# Patient Record
Sex: FEMALE | Race: WHITE | NOT HISPANIC OR LATINO
[De-identification: names, ages, dates, MRNs, and addresses within clinical notes are randomized per-mention and may not be internally consistent; named-entity substitution may affect disease eponyms.]

---

## 2017-05-11 PROBLEM — Z00.00 ENCOUNTER FOR PREVENTIVE HEALTH EXAMINATION: Status: ACTIVE | Noted: 2017-05-11

## 2019-10-28 ENCOUNTER — APPOINTMENT (OUTPATIENT)
Dept: ORTHOPEDIC SURGERY | Facility: CLINIC | Age: 79
End: 2019-10-28
Payer: MEDICARE

## 2019-10-28 VITALS
WEIGHT: 159 LBS | SYSTOLIC BLOOD PRESSURE: 118 MMHG | BODY MASS INDEX: 31.22 KG/M2 | HEIGHT: 60 IN | DIASTOLIC BLOOD PRESSURE: 75 MMHG | HEART RATE: 69 BPM

## 2019-10-28 PROCEDURE — 73564 X-RAY EXAM KNEE 4 OR MORE: CPT | Mod: RT

## 2019-10-28 PROCEDURE — 99204 OFFICE O/P NEW MOD 45 MIN: CPT

## 2020-01-10 ENCOUNTER — APPOINTMENT (OUTPATIENT)
Dept: ORTHOPEDIC SURGERY | Facility: CLINIC | Age: 80
End: 2020-01-10
Payer: MEDICARE

## 2020-01-10 VITALS
SYSTOLIC BLOOD PRESSURE: 115 MMHG | HEART RATE: 74 BPM | BODY MASS INDEX: 31.22 KG/M2 | HEIGHT: 60 IN | DIASTOLIC BLOOD PRESSURE: 74 MMHG | WEIGHT: 159 LBS

## 2020-01-10 PROCEDURE — 73565 X-RAY EXAM OF KNEES: CPT

## 2020-01-10 PROCEDURE — 72170 X-RAY EXAM OF PELVIS: CPT

## 2020-01-10 PROCEDURE — 99214 OFFICE O/P EST MOD 30 MIN: CPT

## 2020-01-14 RX ORDER — CHROMIUM 200 MCG
TABLET ORAL
Refills: 0 | Status: ACTIVE | COMMUNITY

## 2020-01-14 RX ORDER — ASPIRIN 81 MG
81 TABLET, DELAYED RELEASE (ENTERIC COATED) ORAL
Refills: 0 | Status: ACTIVE | COMMUNITY

## 2020-01-14 RX ORDER — OXCARBAZEPINE 300 MG/1
300 TABLET, FILM COATED ORAL
Refills: 0 | Status: ACTIVE | COMMUNITY

## 2020-01-14 RX ORDER — ESCITALOPRAM OXALATE 5 MG/1
TABLET, FILM COATED ORAL
Refills: 0 | Status: ACTIVE | COMMUNITY

## 2020-01-14 RX ORDER — HYDROCODONE BITARTRATE AND ACETAMINOPHEN 5; 300 MG/1; MG/1
TABLET ORAL
Refills: 0 | Status: ACTIVE | COMMUNITY

## 2020-01-14 RX ORDER — ROSUVASTATIN CALCIUM 5 MG/1
TABLET, FILM COATED ORAL
Refills: 0 | Status: ACTIVE | COMMUNITY

## 2020-01-14 NOTE — CONSULT LETTER
[Dear  ___] : Dear  [unfilled], [Please see my note below.] : Please see my note below. [Consult Letter:] : I had the pleasure of evaluating your patient, [unfilled]. [Sincerely,] : Sincerely, [Consult Closing:] : Thank you very much for allowing me to participate in the care of this patient.  If you have any questions, please do not hesitate to contact me. [FreeTextEntry3] : Lalit Hernandez MD\par \par ______________________________________________\par [facility]: Hip/Knee Arthroplasty\par [address], [address], [place] [address]\par (t) [phone]\par (f) [phone]\par  [FreeTextEntry2] : Uriah Delacruz MD\par ([place])

## 2020-01-14 NOTE — HISTORY OF PRESENT ILLNESS
[Worsening] : worsening [de-identified] : Ms. HOMAR DRAKE is a 79 year old female sent by Dr. Abdul for examination, presenting with long standing acute on chronic right knee pain, now worsening. She localizes the pain to the medial anterior aspect of the right knee. She admits to occasional swelling, clicking and grinding of the knee. She has occasional buckling as well. she describes the pain as sharp and severe, and states the pain is present when walking, climbing stairs, standing for long periods. She has been evaluated by another physician, and advised she will need a total knee replacement.  She was referred for vascular evaluation prior to consideration of surgery. She notes she was seen by a vascular physician, and has a follow up appointment upcoming. \par She has been treated conservatively with physical therapy in the past, along with multiple cortisone injections to the knee, with the most recent injection in Sept 2019, with no lasting relief. She presents here for a second opinion regarding total knee replacement. She notes she would like to consider this, as she has limitations in her quality of life due to her increasing pain. \par PMHX: HLD, OA, osteoporosis, Spinal stenosis. \par PSHX: toe surgery at [facility], 2015. \par Family history: She denies family history of any musculoskeletal conditions. admits to family history of stomach cancer (father)\par Social history: never a smoker, admits to occasional exercise, lives in a house with stairs with family. Denies working. \par  [8] : a current pain level of 8/10 [Constant] : ~He/She~ states the symptoms seem to be constant [Walking] : worsened by walking [Rest] : relieved by rest

## 2020-01-14 NOTE — PHYSICAL EXAM
[de-identified] : On general examination the patient is adequately groomed and nourished. The vital parameters are as recorded. \par There are varicose veins bilateral legs, with poor circulation and diminished capillary reflex bilateral LE, with diminished pedal pulses, no skin warmth/erythema/scars/swelling, no ulcers and no palpable lymph nodes or masses in both lower extremities. \par Upper Extremity:\par Both right and left upper extremities are unremarkable in terms of skin rash, lesions, pigmentation, redness, tenderness, swelling, joint instability, abnormal deformity or crepitus. The overall range of motion, sensation, motor tone and strength testing are normal.\par \par Knee Exam:\par The gait is right stiff knee antalgic.\par Knee alignment:            Right 6 degrees varus with mild flexion deformity. \par Left neutral with no flexion deformity.\par Both knees demonstrate no scars and the skin has no warmth, erythema, swelling or tenderness. \par Both knees have a range of motion of\par Extension:                    Right 0 degrees                        Left 0 degrees\par Flexion:                                   Right 110 degrees          Left 125 degrees\par Right Knee: There is medial joint line tenderness. There is mild effusion. \par Valeria's test is positive. Vivian test is positive.\par Lachman's test, Anterior/Posterior Drawer test and Pivot Shift Tests are negative. \par There is right knee grade 1 MCL mediolateral laxity and no anteroposterior instability. \par Patella compression test is negative and patellofemoral tracking is normal with no lateral subluxation, apprehension or instability. \par Right knee quadriceps and hamstrings power is 4+.\par Left knee quadriceps and hamstrings power is 4+\par \par Hip Exam:\par The gait and station is normal\par The patient has equal leg lengths and no pelvic tilt. Braden/Cornel test is 7 inches on the right and 7 inches on the left. Active SLR is 60 degrees on the right and 60 degrees on the left. Both hips demonstrate no scars and the skin has no signs of inflammation or tenderness. \par Both Hips have a normal range of motion of flexion to 100 degrees, abduction 40 degrees, adduction 20 degrees, external rotation 40 degrees, internal rotation 20 degrees with symmetrical motion in flexion and extension. There is no flexion contracture, deformity or instability. Labral impingement tests are negative.\par Both hips flexor, abductor and extensor power is normal.\par \par  [de-identified] : The following radiographs were ordered and read by me during this patients visit. I reviewed each radiograph in detail with the patient and discussed the findings as highlighted below. \par AP standing view of the bilateral knees confirm advanced degenerative joint disease with medial joint space bone on bone articulation of the right knee. AP lateral and skyline views of the right knee taken previously reveal advanced degenerative joint disease with medial joint line narrowing bone on bone, with osteophyte formation. \par AP view of the pelvis are within normal limits\par

## 2020-01-14 NOTE — DISCUSSION/SUMMARY
[de-identified] : right knee advanced degenerative joint disease with PAD\par \par The natural history and treatment of degenerative arthritis was discussed with the patient at length today. The spectrum of treatment including nonoperative modalities to surgical intervention was elucidated. Noninvasive and nonoperative treatment modalities include weight reduction, activity modification with low impact exercise,  as needed use of acetaminophen or anti-inflammatory medications if tolerated, glucosamine/chondroitin supplements, and physical therapy. Further treatments can include corticosteroid injection and the use of viscosupplementation with hyaluronic acid injections. Definitive surgical treatment can certainly include total joint arthroplasty also. The risks and benefits of each treatment options was discussed and all questions were answered.\par \par At this time, she has been recommended for vascular evaluation prior to consideration of surgical treatment for her right knee, due to the findings as seen on examination. She has been provided with a referral to Dr. Dill, for evaluation and treatment. \par She may return following evaluation and treatment, for consideration of total knee replacement. \par In the interim, The patient was informed of the findings and recommended conservative management in the form of a home exercise program, activity modifications, stationary bicycling, swimming and weight loss program. A trial of Glucosamine and Chondroiten Sulphate was recommended.\par

## 2020-02-03 ENCOUNTER — APPOINTMENT (OUTPATIENT)
Dept: ORTHOPEDIC SURGERY | Facility: CLINIC | Age: 80
End: 2020-02-03
Payer: MEDICARE

## 2020-02-03 PROCEDURE — 99212 OFFICE O/P EST SF 10 MIN: CPT

## 2020-02-11 ENCOUNTER — OUTPATIENT (OUTPATIENT)
Dept: OUTPATIENT SERVICES | Facility: HOSPITAL | Age: 80
LOS: 1 days | End: 2020-02-11
Payer: MEDICARE

## 2020-02-11 VITALS
HEART RATE: 73 BPM | HEIGHT: 59.5 IN | RESPIRATION RATE: 15 BRPM | DIASTOLIC BLOOD PRESSURE: 67 MMHG | TEMPERATURE: 98 F | WEIGHT: 147.71 LBS | OXYGEN SATURATION: 97 % | SYSTOLIC BLOOD PRESSURE: 106 MMHG

## 2020-02-11 DIAGNOSIS — Z98.890 OTHER SPECIFIED POSTPROCEDURAL STATES: Chronic | ICD-10-CM

## 2020-02-11 DIAGNOSIS — Z98.49 CATARACT EXTRACTION STATUS, UNSPECIFIED EYE: Chronic | ICD-10-CM

## 2020-02-11 DIAGNOSIS — Z90.89 ACQUIRED ABSENCE OF OTHER ORGANS: Chronic | ICD-10-CM

## 2020-02-11 DIAGNOSIS — M25.561 PAIN IN RIGHT KNEE: ICD-10-CM

## 2020-02-11 DIAGNOSIS — Z01.818 ENCOUNTER FOR OTHER PREPROCEDURAL EXAMINATION: ICD-10-CM

## 2020-02-11 DIAGNOSIS — M17.11 UNILATERAL PRIMARY OSTEOARTHRITIS, RIGHT KNEE: ICD-10-CM

## 2020-02-11 LAB
ALBUMIN SERPL ELPH-MCNC: 3.5 G/DL — SIGNIFICANT CHANGE UP (ref 3.3–5)
ALP SERPL-CCNC: 108 U/L — SIGNIFICANT CHANGE UP (ref 30–120)
ALT FLD-CCNC: 41 U/L DA — SIGNIFICANT CHANGE UP (ref 10–60)
ANION GAP SERPL CALC-SCNC: 7 MMOL/L — SIGNIFICANT CHANGE UP (ref 5–17)
APTT BLD: 29.6 SEC — SIGNIFICANT CHANGE UP (ref 28.5–37)
AST SERPL-CCNC: 32 U/L — SIGNIFICANT CHANGE UP (ref 10–40)
BILIRUB SERPL-MCNC: 0.3 MG/DL — SIGNIFICANT CHANGE UP (ref 0.2–1.2)
BUN SERPL-MCNC: 11 MG/DL — SIGNIFICANT CHANGE UP (ref 7–23)
CALCIUM SERPL-MCNC: 9 MG/DL — SIGNIFICANT CHANGE UP (ref 8.4–10.5)
CHLORIDE SERPL-SCNC: 106 MMOL/L — SIGNIFICANT CHANGE UP (ref 96–108)
CO2 SERPL-SCNC: 30 MMOL/L — SIGNIFICANT CHANGE UP (ref 22–31)
CREAT SERPL-MCNC: 0.54 MG/DL — SIGNIFICANT CHANGE UP (ref 0.5–1.3)
GLUCOSE SERPL-MCNC: 102 MG/DL — HIGH (ref 70–99)
HBA1C BLD-MCNC: 6 % — HIGH (ref 4–5.6)
HCT VFR BLD CALC: 41.6 % — SIGNIFICANT CHANGE UP (ref 34.5–45)
HGB BLD-MCNC: 13.8 G/DL — SIGNIFICANT CHANGE UP (ref 11.5–15.5)
INR BLD: 1.01 RATIO — SIGNIFICANT CHANGE UP (ref 0.88–1.16)
MCHC RBC-ENTMCNC: 29.9 PG — SIGNIFICANT CHANGE UP (ref 27–34)
MCHC RBC-ENTMCNC: 33.2 GM/DL — SIGNIFICANT CHANGE UP (ref 32–36)
MCV RBC AUTO: 90.2 FL — SIGNIFICANT CHANGE UP (ref 80–100)
MRSA PCR RESULT.: SIGNIFICANT CHANGE UP
NRBC # BLD: 0 /100 WBCS — SIGNIFICANT CHANGE UP (ref 0–0)
PLATELET # BLD AUTO: 218 K/UL — SIGNIFICANT CHANGE UP (ref 150–400)
POTASSIUM SERPL-MCNC: 4.1 MMOL/L — SIGNIFICANT CHANGE UP (ref 3.5–5.3)
POTASSIUM SERPL-SCNC: 4.1 MMOL/L — SIGNIFICANT CHANGE UP (ref 3.5–5.3)
PROT SERPL-MCNC: 7.7 G/DL — SIGNIFICANT CHANGE UP (ref 6–8.3)
PROTHROM AB SERPL-ACNC: 11.1 SEC — SIGNIFICANT CHANGE UP (ref 10–12.9)
RBC # BLD: 4.61 M/UL — SIGNIFICANT CHANGE UP (ref 3.8–5.2)
RBC # FLD: 13 % — SIGNIFICANT CHANGE UP (ref 10.3–14.5)
S AUREUS DNA NOSE QL NAA+PROBE: SIGNIFICANT CHANGE UP
SODIUM SERPL-SCNC: 143 MMOL/L — SIGNIFICANT CHANGE UP (ref 135–145)
WBC # BLD: 5.33 K/UL — SIGNIFICANT CHANGE UP (ref 3.8–10.5)
WBC # FLD AUTO: 5.33 K/UL — SIGNIFICANT CHANGE UP (ref 3.8–10.5)

## 2020-02-11 PROCEDURE — 93010 ELECTROCARDIOGRAM REPORT: CPT

## 2020-02-11 PROCEDURE — 93005 ELECTROCARDIOGRAM TRACING: CPT

## 2020-02-11 PROCEDURE — G0463: CPT

## 2020-02-11 NOTE — H&P PST ADULT - NSICDXFAMILYHX_GEN_ALL_CORE_FT
FAMILY HISTORY:  Family history of vascular disease, sister   Family history- stomach cancer, father

## 2020-02-11 NOTE — H&P PST ADULT - NSICDXPASTSURGICALHX_GEN_ALL_CORE_FT
PAST SURGICAL HISTORY:  S/P cataract surgery bilateral    S/P foot surgery, right right great toe surgery    S/P tonsillectomy     S/P wrist surgery right

## 2020-02-11 NOTE — H&P PST ADULT - NSICDXPASTMEDICALHX_GEN_ALL_CORE_FT
PAST MEDICAL HISTORY:  Anxiety     Depression     Dizziness on and off for years; occasionally lose balance    GERD (gastroesophageal reflux disease)     History of prediabetes     History of vascular disease venous; has swelling    Hyperlipidemia     Osteoarthritis

## 2020-02-11 NOTE — H&P PST ADULT - NSICDXPROBLEM_GEN_ALL_CORE_FT
PROBLEM DIAGNOSES  Problem: Right knee pain  Assessment and Plan: right total knee replacement; preop instructions given; to have medical  clearance and had vascular clearance

## 2020-02-11 NOTE — H&P PST ADULT - HISTORY OF PRESENT ILLNESS
this is a  78 y/o female who has had right knee pain for years; test show bone on bone; has had knee injections with some relief; to have right knee replacement; takes no pain meds

## 2020-02-11 NOTE — H&P PST ADULT - NSANTHOSAYNRD_GEN_A_CORE
No. SOLOMON screening performed.  STOP BANG Legend: 0-2 = LOW Risk; 3-4 = INTERMEDIATE Risk; 5-8 = HIGH Risk

## 2020-02-24 ENCOUNTER — APPOINTMENT (OUTPATIENT)
Dept: ORTHOPEDIC SURGERY | Facility: CLINIC | Age: 80
End: 2020-02-24
Payer: MEDICARE

## 2020-02-24 PROCEDURE — 99211 OFF/OP EST MAY X REQ PHY/QHP: CPT | Mod: PD

## 2020-02-26 ENCOUNTER — TRANSCRIPTION ENCOUNTER (OUTPATIENT)
Age: 80
End: 2020-02-26

## 2020-02-26 ENCOUNTER — FORM ENCOUNTER (OUTPATIENT)
Age: 80
End: 2020-02-26

## 2020-02-27 ENCOUNTER — TRANSCRIPTION ENCOUNTER (OUTPATIENT)
Age: 80
End: 2020-02-27

## 2020-02-27 ENCOUNTER — RESULT REVIEW (OUTPATIENT)
Age: 80
End: 2020-02-27

## 2020-02-27 ENCOUNTER — INPATIENT (INPATIENT)
Facility: HOSPITAL | Age: 80
LOS: 1 days | Discharge: HOME | DRG: 470 | End: 2020-02-29
Attending: ORTHOPAEDIC SURGERY | Admitting: ORTHOPAEDIC SURGERY
Payer: MEDICARE

## 2020-02-27 ENCOUNTER — APPOINTMENT (OUTPATIENT)
Dept: ORTHOPEDIC SURGERY | Facility: HOSPITAL | Age: 80
End: 2020-02-27

## 2020-02-27 VITALS
WEIGHT: 154.1 LBS | HEIGHT: 69 IN | SYSTOLIC BLOOD PRESSURE: 114 MMHG | TEMPERATURE: 98 F | RESPIRATION RATE: 18 BRPM | DIASTOLIC BLOOD PRESSURE: 73 MMHG | OXYGEN SATURATION: 96 % | HEART RATE: 68 BPM

## 2020-02-27 DIAGNOSIS — M17.11 UNILATERAL PRIMARY OSTEOARTHRITIS, RIGHT KNEE: ICD-10-CM

## 2020-02-27 DIAGNOSIS — Z98.49 CATARACT EXTRACTION STATUS, UNSPECIFIED EYE: Chronic | ICD-10-CM

## 2020-02-27 DIAGNOSIS — Z90.89 ACQUIRED ABSENCE OF OTHER ORGANS: Chronic | ICD-10-CM

## 2020-02-27 DIAGNOSIS — Z98.890 OTHER SPECIFIED POSTPROCEDURAL STATES: Chronic | ICD-10-CM

## 2020-02-27 LAB
ANION GAP SERPL CALC-SCNC: 12 MMOL/L — SIGNIFICANT CHANGE UP (ref 5–17)
BUN SERPL-MCNC: 12 MG/DL — SIGNIFICANT CHANGE UP (ref 7–23)
CALCIUM SERPL-MCNC: 8.9 MG/DL — SIGNIFICANT CHANGE UP (ref 8.4–10.5)
CHLORIDE SERPL-SCNC: 104 MMOL/L — SIGNIFICANT CHANGE UP (ref 96–108)
CO2 SERPL-SCNC: 24 MMOL/L — SIGNIFICANT CHANGE UP (ref 22–31)
CREAT SERPL-MCNC: 0.8 MG/DL — SIGNIFICANT CHANGE UP (ref 0.5–1.3)
GLUCOSE SERPL-MCNC: 159 MG/DL — HIGH (ref 70–99)
HCT VFR BLD CALC: 37.3 % — SIGNIFICANT CHANGE UP (ref 34.5–45)
HGB BLD-MCNC: 12.5 G/DL — SIGNIFICANT CHANGE UP (ref 11.5–15.5)
POTASSIUM SERPL-MCNC: 3.9 MMOL/L — SIGNIFICANT CHANGE UP (ref 3.5–5.3)
POTASSIUM SERPL-SCNC: 3.9 MMOL/L — SIGNIFICANT CHANGE UP (ref 3.5–5.3)
SODIUM SERPL-SCNC: 140 MMOL/L — SIGNIFICANT CHANGE UP (ref 135–145)

## 2020-02-27 PROCEDURE — 88311 DECALCIFY TISSUE: CPT | Mod: 26

## 2020-02-27 PROCEDURE — 88305 TISSUE EXAM BY PATHOLOGIST: CPT | Mod: 26

## 2020-02-27 PROCEDURE — 27447 TOTAL KNEE ARTHROPLASTY: CPT | Mod: AS,RT

## 2020-02-27 PROCEDURE — 27447 TOTAL KNEE ARTHROPLASTY: CPT | Mod: RT

## 2020-02-27 PROCEDURE — 73562 X-RAY EXAM OF KNEE 3: CPT | Mod: 26,RT

## 2020-02-27 RX ORDER — ACETAMINOPHEN 500 MG
1000 TABLET ORAL EVERY 8 HOURS
Refills: 0 | Status: DISCONTINUED | OUTPATIENT
Start: 2020-02-28 | End: 2020-02-29

## 2020-02-27 RX ORDER — PANTOPRAZOLE SODIUM 20 MG/1
40 TABLET, DELAYED RELEASE ORAL
Refills: 0 | Status: DISCONTINUED | OUTPATIENT
Start: 2020-02-28 | End: 2020-02-29

## 2020-02-27 RX ORDER — CEFAZOLIN SODIUM 1 G
2000 VIAL (EA) INJECTION ONCE
Refills: 0 | Status: COMPLETED | OUTPATIENT
Start: 2020-02-27 | End: 2020-02-27

## 2020-02-27 RX ORDER — APIXABAN 2.5 MG/1
2.5 TABLET, FILM COATED ORAL
Refills: 0 | Status: COMPLETED | OUTPATIENT
Start: 2020-02-28 | End: 2020-02-28

## 2020-02-27 RX ORDER — HYDROMORPHONE HYDROCHLORIDE 2 MG/ML
0.5 INJECTION INTRAMUSCULAR; INTRAVENOUS; SUBCUTANEOUS
Refills: 0 | Status: DISCONTINUED | OUTPATIENT
Start: 2020-02-27 | End: 2020-02-27

## 2020-02-27 RX ORDER — ACETAMINOPHEN 500 MG
1000 TABLET ORAL EVERY 6 HOURS
Refills: 0 | Status: COMPLETED | OUTPATIENT
Start: 2020-02-27 | End: 2020-02-28

## 2020-02-27 RX ORDER — ATORVASTATIN CALCIUM 80 MG/1
80 TABLET, FILM COATED ORAL AT BEDTIME
Refills: 0 | Status: DISCONTINUED | OUTPATIENT
Start: 2020-02-27 | End: 2020-02-29

## 2020-02-27 RX ORDER — CHLORHEXIDINE GLUCONATE 213 G/1000ML
1 SOLUTION TOPICAL ONCE
Refills: 0 | Status: COMPLETED | OUTPATIENT
Start: 2020-02-27 | End: 2020-02-27

## 2020-02-27 RX ORDER — ONDANSETRON 8 MG/1
4 TABLET, FILM COATED ORAL EVERY 6 HOURS
Refills: 0 | Status: DISCONTINUED | OUTPATIENT
Start: 2020-02-27 | End: 2020-02-29

## 2020-02-27 RX ORDER — OXYCODONE HYDROCHLORIDE 5 MG/1
5 TABLET ORAL
Refills: 0 | Status: DISCONTINUED | OUTPATIENT
Start: 2020-02-27 | End: 2020-02-28

## 2020-02-27 RX ORDER — ESCITALOPRAM OXALATE 10 MG/1
20 TABLET, FILM COATED ORAL DAILY
Refills: 0 | Status: DISCONTINUED | OUTPATIENT
Start: 2020-02-27 | End: 2020-02-29

## 2020-02-27 RX ORDER — APREPITANT 80 MG/1
40 CAPSULE ORAL ONCE
Refills: 0 | Status: COMPLETED | OUTPATIENT
Start: 2020-02-27 | End: 2020-02-27

## 2020-02-27 RX ORDER — SODIUM CHLORIDE 9 MG/ML
1000 INJECTION, SOLUTION INTRAVENOUS
Refills: 0 | Status: DISCONTINUED | OUTPATIENT
Start: 2020-02-27 | End: 2020-02-28

## 2020-02-27 RX ORDER — CEFAZOLIN SODIUM 1 G
2000 VIAL (EA) INJECTION EVERY 8 HOURS
Refills: 0 | Status: COMPLETED | OUTPATIENT
Start: 2020-02-27 | End: 2020-02-28

## 2020-02-27 RX ORDER — TRANEXAMIC ACID 100 MG/ML
1000 INJECTION, SOLUTION INTRAVENOUS ONCE
Refills: 0 | Status: COMPLETED | OUTPATIENT
Start: 2020-02-27 | End: 2020-02-27

## 2020-02-27 RX ORDER — OXYCODONE HYDROCHLORIDE 5 MG/1
10 TABLET ORAL
Refills: 0 | Status: DISCONTINUED | OUTPATIENT
Start: 2020-02-27 | End: 2020-02-28

## 2020-02-27 RX ORDER — SENNA PLUS 8.6 MG/1
2 TABLET ORAL AT BEDTIME
Refills: 0 | Status: DISCONTINUED | OUTPATIENT
Start: 2020-02-27 | End: 2020-02-29

## 2020-02-27 RX ORDER — OXYCODONE HYDROCHLORIDE 5 MG/1
5 TABLET ORAL ONCE
Refills: 0 | Status: DISCONTINUED | OUTPATIENT
Start: 2020-02-27 | End: 2020-02-27

## 2020-02-27 RX ORDER — POLYETHYLENE GLYCOL 3350 17 G/17G
17 POWDER, FOR SOLUTION ORAL AT BEDTIME
Refills: 0 | Status: DISCONTINUED | OUTPATIENT
Start: 2020-02-27 | End: 2020-02-29

## 2020-02-27 RX ORDER — MAGNESIUM HYDROXIDE 400 MG/1
30 TABLET, CHEWABLE ORAL DAILY
Refills: 0 | Status: DISCONTINUED | OUTPATIENT
Start: 2020-02-27 | End: 2020-02-29

## 2020-02-27 RX ORDER — ACETAMINOPHEN 500 MG
2 TABLET ORAL
Qty: 0 | Refills: 0 | DISCHARGE
Start: 2020-02-27

## 2020-02-27 RX ORDER — SODIUM CHLORIDE 9 MG/ML
1000 INJECTION, SOLUTION INTRAVENOUS
Refills: 0 | Status: DISCONTINUED | OUTPATIENT
Start: 2020-02-27 | End: 2020-02-27

## 2020-02-27 RX ORDER — APIXABAN 2.5 MG/1
2.5 TABLET, FILM COATED ORAL EVERY 12 HOURS
Refills: 0 | Status: DISCONTINUED | OUTPATIENT
Start: 2020-02-29 | End: 2020-02-29

## 2020-02-27 RX ORDER — ACETAMINOPHEN 500 MG
1000 TABLET ORAL ONCE
Refills: 0 | Status: COMPLETED | OUTPATIENT
Start: 2020-02-27 | End: 2020-02-27

## 2020-02-27 RX ORDER — OXCARBAZEPINE 300 MG/1
300 TABLET, FILM COATED ORAL
Refills: 0 | Status: DISCONTINUED | OUTPATIENT
Start: 2020-02-27 | End: 2020-02-29

## 2020-02-27 RX ORDER — CELECOXIB 200 MG/1
200 CAPSULE ORAL EVERY 12 HOURS
Refills: 0 | Status: DISCONTINUED | OUTPATIENT
Start: 2020-02-27 | End: 2020-02-29

## 2020-02-27 RX ORDER — HYDROMORPHONE HYDROCHLORIDE 2 MG/ML
0.5 INJECTION INTRAMUSCULAR; INTRAVENOUS; SUBCUTANEOUS
Refills: 0 | Status: DISCONTINUED | OUTPATIENT
Start: 2020-02-27 | End: 2020-02-29

## 2020-02-27 RX ADMIN — ATORVASTATIN CALCIUM 80 MILLIGRAM(S): 80 TABLET, FILM COATED ORAL at 21:24

## 2020-02-27 RX ADMIN — POLYETHYLENE GLYCOL 3350 17 GRAM(S): 17 POWDER, FOR SOLUTION ORAL at 21:24

## 2020-02-27 RX ADMIN — OXYCODONE HYDROCHLORIDE 10 MILLIGRAM(S): 5 TABLET ORAL at 23:56

## 2020-02-27 RX ADMIN — APREPITANT 40 MILLIGRAM(S): 80 CAPSULE ORAL at 08:13

## 2020-02-27 RX ADMIN — SODIUM CHLORIDE 75 MILLILITER(S): 9 INJECTION, SOLUTION INTRAVENOUS at 15:09

## 2020-02-27 RX ADMIN — CELECOXIB 200 MILLIGRAM(S): 200 CAPSULE ORAL at 21:24

## 2020-02-27 RX ADMIN — OXCARBAZEPINE 300 MILLIGRAM(S): 300 TABLET, FILM COATED ORAL at 21:24

## 2020-02-27 RX ADMIN — Medication 400 MILLIGRAM(S): at 17:48

## 2020-02-27 RX ADMIN — OXYCODONE HYDROCHLORIDE 5 MILLIGRAM(S): 5 TABLET ORAL at 19:00

## 2020-02-27 RX ADMIN — OXYCODONE HYDROCHLORIDE 5 MILLIGRAM(S): 5 TABLET ORAL at 19:49

## 2020-02-27 RX ADMIN — OXYCODONE HYDROCHLORIDE 5 MILLIGRAM(S): 5 TABLET ORAL at 17:48

## 2020-02-27 RX ADMIN — SENNA PLUS 2 TABLET(S): 8.6 TABLET ORAL at 21:23

## 2020-02-27 RX ADMIN — Medication 400 MILLIGRAM(S): at 23:09

## 2020-02-27 RX ADMIN — CHLORHEXIDINE GLUCONATE 1 APPLICATION(S): 213 SOLUTION TOPICAL at 08:13

## 2020-02-27 RX ADMIN — OXYCODONE HYDROCHLORIDE 5 MILLIGRAM(S): 5 TABLET ORAL at 20:30

## 2020-02-27 RX ADMIN — SODIUM CHLORIDE 100 MILLILITER(S): 9 INJECTION, SOLUTION INTRAVENOUS at 19:10

## 2020-02-27 RX ADMIN — OXYCODONE HYDROCHLORIDE 10 MILLIGRAM(S): 5 TABLET ORAL at 23:09

## 2020-02-27 RX ADMIN — Medication 100 MILLIGRAM(S): at 18:58

## 2020-02-27 RX ADMIN — SODIUM CHLORIDE 100 MILLILITER(S): 9 INJECTION, SOLUTION INTRAVENOUS at 17:48

## 2020-02-27 RX ADMIN — Medication 1000 MILLIGRAM(S): at 18:59

## 2020-02-27 RX ADMIN — HYDROMORPHONE HYDROCHLORIDE 0.5 MILLIGRAM(S): 2 INJECTION INTRAMUSCULAR; INTRAVENOUS; SUBCUTANEOUS at 15:46

## 2020-02-27 RX ADMIN — HYDROMORPHONE HYDROCHLORIDE 0.5 MILLIGRAM(S): 2 INJECTION INTRAMUSCULAR; INTRAVENOUS; SUBCUTANEOUS at 16:00

## 2020-02-27 RX ADMIN — Medication 1000 MILLIGRAM(S): at 23:09

## 2020-02-27 NOTE — BRIEF OPERATIVE NOTE - NSICDXBRIEFPROCEDURE_GEN_ALL_CORE_FT
PROCEDURES:  Total knee replacement 27-Feb-2020 14:21:14  Juwan Gagnon PROCEDURES:  Total knee replacement 27-Feb-2020 14:21:14 RIGHT Juwna Gagnon

## 2020-02-27 NOTE — PROGRESS NOTE ADULT - SUBJECTIVE AND OBJECTIVE BOX
Ortho PA - Post Op Check - S/P right TKR with adductor canal nerve block and spinal with IV sedation           Pt alert and c/o increasing pain from her right knee down to her ankle (good relief with IVP Dilaudid given in PACU).  Denies nausea     Vital Signs Last 24 Hrs  T(C): 36.5 (02-27-20 @ 16:15), Max: 36.8 (02-27-20 @ 14:21)  T(F): 97.7 (02-27-20 @ 16:15), Max: 98.2 (02-27-20 @ 14:21)  HR: 68 (02-27-20 @ 16:15) (65 - 78)  BP: 114/66 (02-27-20 @ 16:15) (95/61 - 119/66)  BP(mean): --  RR: 16 (02-27-20 @ 16:15) (14 - 20)  SpO2: 95% (02-27-20 @ 16:15) (95% - 100%)  I&O's Detail    27 Feb 2020 07:01  -  27 Feb 2020 17:37  --------------------------------------------------------  IN:    lactated ringers.: 1500 mL    Oral Fluid: 240 mL  Total IN: 1740 mL    OUT:    Accordian drain right knee: 50 mL emptied in PACU    Estimated Blood Loss: 150 mL in OR    Voided: 300 mL  Total OUT: 500 mL    Total NET: 1240 mL        I&O's Summary    27 Feb 2020 07:01  -  27 Feb 2020 17:37  --------------------------------------------------------  IN: 1740 mL / OUT: 500 mL / NET: 1240 mL                     MEDICATIONS:acetaminophen  IVPB .. 1000 milliGRAM(s) IV Intermittent every 6 hours  aluminum hydroxide/magnesium hydroxide/simethicone Suspension 30 milliLiter(s) Oral four times a day PRN  atorvastatin 80 milliGRAM(s) Oral at bedtime  ceFAZolin   IVPB 2000 milliGRAM(s) IV Intermittent every 8 hours  celecoxib 200 milliGRAM(s) Oral every 12 hours  escitalopram 20 milliGRAM(s) Oral daily  HYDROmorphone  Injectable 0.5 milliGRAM(s) IV Push every 3 hours PRN  lactated ringers. 1000 milliLiter(s) IV Continuous <Continuous>  magnesium hydroxide Suspension 30 milliLiter(s) Oral daily PRN  ondansetron Injectable 4 milliGRAM(s) IV Push every 6 hours PRN  OXcarbazepine 300 milliGRAM(s) Oral two times a day  oxyCODONE    IR 5 milliGRAM(s) Oral every 3 hours PRN  oxyCODONE    IR 10 milliGRAM(s) Oral every 3 hours PRN  polyethylene glycol 3350 17 Gram(s) Oral at bedtime  senna 2 Tablet(s) Oral at bedtime    Anticoagulation: PAS to LE's      Antibiotics:   ceFAZolin   IVPB 2000 milliGRAM(s) IV Intermittent every 8 hours      Pain medications:   acetaminophen  IVPB .. 1000 milliGRAM(s) IV Intermittent every 6 hours  celecoxib 200 milliGRAM(s) Oral every 12 hours  escitalopram 20 milliGRAM(s) Oral daily  HYDROmorphone  Injectable 0.5 milliGRAM(s) IV Push every 3 hours PRN  ondansetron Injectable 4 milliGRAM(s) IV Push every 6 hours PRN  OXcarbazepine 300 milliGRAM(s) Oral two times a day  oxyCODONE    IR 5 milliGRAM(s) Oral every 3 hours PRN  oxyCODONE    IR 10 milliGRAM(s) Oral every 3 hours PRN          PE:  Right Knee-primary surgical bandage dry and intact.  Hemovac drain intact and patent.  Feet mobile and sensate.  *EHLs/ant.tibs. 5/5. DP 1+.  PAS on LE's.  Calves soft and nontender.    A/P: Ortho stable post-op  - Continue post-op orders; pain management with above plan (will offer oral narcotics for longer pain relief)  - Check labs today and in A.M.  - DVT prevention with PAS and will start Eliquis in AM.  - PT /OT for OOB, full WBAT  - Medical consult with Dr. Owen oleary.  -Discharge planning for Rehab as per patient and family request.  -Will continue to monitor closely with attendings.

## 2020-02-27 NOTE — DISCHARGE NOTE PROVIDER - HOSPITAL COURSE
This 78yo female patient was admitted to [facility] on 2/27/20 with a history of severe degenerative joint disease of the right knee and for elective total joint replacement.  Patient had appropriate preop medical evaluation and testing.    Patient received antibiotics according to SCIP guidelines for infection prevention.  The patient underwent an uncomplicated right total knee replacement by Dr. Denzel Figueredo on 2/27.   Eliquis was given for DVT prophylaxis.  Anesthesia, Medical Hospitalist, Physical Therapy and Occupational Therapy were consulted.  Patient is stable for discharge with a good prognosis.  Appropriate discharge instructions and medications are provided in this document.

## 2020-02-27 NOTE — DISCHARGE NOTE PROVIDER - NSDCACTIVITY_GEN_ALL_CORE
Do not drive or operate machinery/Walking - Outdoors allowed/Stairs allowed/Showering allowed/Walking - Indoors allowed/No heavy lifting/straining

## 2020-02-27 NOTE — PHYSICAL THERAPY INITIAL EVALUATION ADULT - RANGE OF MOTION EXAMINATION, REHAB EVAL
bilateral upper extremity ROM was WFL (within functional limits)/Left LE ROM was WFL (within functional limits)/Right LE not tested due to surgery.

## 2020-02-27 NOTE — DISCHARGE NOTE PROVIDER - NSDCMRMEDTOKEN_GEN_ALL_CORE_FT
acetaminophen 500 mg oral tablet: 2 tab(s) orally every 12 hours for 2 to 3 weeks.  aspirin 81 mg oral delayed release tablet: 1 tab(s) orally once a day  Crestor 40 mg oral tablet: 1 tab(s) orally once a day (at bedtime)  Lexapro 20 mg oral tablet: 1 tab(s) orally once a day  Trileptal 300 mg oral tablet: 1 tab(s) orally 2 times a day  Vitamin D3 1000 intl units (25 mcg) oral tablet: 1 tab(s) orally once a day acetaminophen 500 mg oral tablet: 2 tab(s) orally every 12 hours for 2 to 3 weeks.  apixaban 2.5 mg oral tablet: 1 tab(s) orally every 12 hours  celecoxib 200 mg oral capsule: 1 cap(s) orally every 12 hours  Crestor 40 mg oral tablet: 1 tab(s) orally once a day (at bedtime)  HYDROmorphone 2 mg oral tablet: 1 tab(s) orally every 3 hours, As needed, Moderate Pain (4 - 6)  Lexapro 20 mg oral tablet: 1 tab(s) orally once a day  Trileptal 300 mg oral tablet: 1 tab(s) orally 2 times a day  Vitamin D3 1000 intl units (25 mcg) oral tablet: 1 tab(s) orally once a day

## 2020-02-27 NOTE — PHYSICAL THERAPY INITIAL EVALUATION ADULT - GENERAL OBSERVATIONS, REHAB EVAL
Patient was found in semi-whitman position. NAD. +IV. +2LO2 via NC. +SCD. +HemoVac. +Call Izabel. BRANDON Present.

## 2020-02-27 NOTE — CONSULT NOTE ADULT - SUBJECTIVE AND OBJECTIVE BOX
Patient is a 79y old  Female who presents with a chief complaint of Elective right total knee replacement for severe DJD (2020 14:34)      HPI:        PAST MEDICAL & SURGICAL HISTORY:  History of vascular disease: venous; has swelling  GERD (gastroesophageal reflux disease)  Dizziness: on and off for years; occasionally lose balance  Osteoarthritis  History of prediabetes  Hyperlipidemia  Depression  Anxiety  S/P cataract surgery: bilateral  S/P tonsillectomy  S/P wrist surgery: right  S/P foot surgery, right: right great toe surgery      Allergies    adhesives (Pruritus; Rash)  No Known Drug Allergies    Intolerances    lactose (Diarrhea)      Home Medications:  acetaminophen 500 mg oral tablet: 2 tab(s) orally every 12 hours for 2 to 3 weeks. (2020 14:47)  aspirin 81 mg oral delayed release tablet: 1 tab(s) orally once a day (2020 08:06)  Crestor 40 mg oral tablet: 1 tab(s) orally once a day (at bedtime) (2020 08:06)  Lexapro 20 mg oral tablet: 1 tab(s) orally once a day (2020 08:06)  Trileptal 300 mg oral tablet: 1 tab(s) orally 2 times a day (2020 08:06)  Vitamin D3 1000 intl units (25 mcg) oral tablet: 1 tab(s) orally once a day (2020 08:06)      MEDICATIONS  (STANDING):  acetaminophen  IVPB .. 1000 milliGRAM(s) IV Intermittent every 6 hours  atorvastatin 80 milliGRAM(s) Oral at bedtime  ceFAZolin   IVPB 2000 milliGRAM(s) IV Intermittent every 8 hours  celecoxib 200 milliGRAM(s) Oral every 12 hours  escitalopram 20 milliGRAM(s) Oral daily  lactated ringers. 1000 milliLiter(s) (100 mL/Hr) IV Continuous <Continuous>  OXcarbazepine 300 milliGRAM(s) Oral two times a day  polyethylene glycol 3350 17 Gram(s) Oral at bedtime  senna 2 Tablet(s) Oral at bedtime    MEDICATIONS  (PRN):  aluminum hydroxide/magnesium hydroxide/simethicone Suspension 30 milliLiter(s) Oral four times a day PRN Indigestion  HYDROmorphone  Injectable 0.5 milliGRAM(s) IV Push every 3 hours PRN Severe Pain (7 - 10)  magnesium hydroxide Suspension 30 milliLiter(s) Oral daily PRN Constipation  ondansetron Injectable 4 milliGRAM(s) IV Push every 6 hours PRN Nausea and/or Vomiting  oxyCODONE    IR 5 milliGRAM(s) Oral every 3 hours PRN Mild Pain (1 - 3)  oxyCODONE    IR 10 milliGRAM(s) Oral every 3 hours PRN Moderate Pain (4 - 6)      FAMILY HISTORY:  Family history- stomach cancer: father   Family history of vascular disease: sister       Social History:     REVIEW OF SYSTEMS:  CONSTITUTIONAL: No fever, weight loss, or fatigue  EYES: No eye pain, visual disturbances, or discharge  ENMT:  No difficulty hearing, tinnitus, vertigo; No sinus or throat pain  NECK: No pain or stiffness  BREASTS: No pain, masses, or nipple discharge  RESPIRATORY: No cough, wheezing, chills or hemoptysis; No shortness of breath  CARDIOVASCULAR: No chest pain, palpitations, dizziness, or leg swelling  GASTROINTESTINAL: No abdominal or epigastric pain. No nausea, vomiting, or hematemesis; No diarrhea or constipation. No melena or hematochezia.  GENITOURINARY: No dysuria, frequency, hematuria, or incontinence  NEUROLOGICAL: No headaches, memory loss, loss of strength, numbness, or tremors  SKIN: No itching, burning, rashes, or lesions   LYMPH NODES: No enlarged glands  ENDOCRINE: No heat or cold intolerance; No hair loss; No polydipsia or polyuria  MUSCULOSKELETAL: No joint pain or swelling; No muscle, back, or extremity pain  PSYCHIATRIC: No depression, anxiety, mood swings, or difficulty sleeping  HEME/LYMPH: No easy bruising, or bleeding gums  ALLERGY AND IMMUNOLOGIC: No hives or eczema    Vital Signs Last 24 Hrs  T(C): 36.5 (2020 16:15), Max: 36.8 (2020 14:21)  T(F): 97.7 (2020 16:15), Max: 98.2 (2020 14:21)  HR: 68 (2020 16:15) (65 - 78)  BP: 114/66 (2020 16:15) (95/61 - 127/63)  BP(mean): --  RR: 16 (2020 16:15) (14 - 20)  SpO2: 95% (2020 16:15) (95% - 100%)    PHYSICAL EXAM:  GENERAL: NAD, well-groomed, well-developed  HEAD:  Atraumatic, Normocephalic  EYES: EOMI, PERRLA, conjunctiva and sclera clear  ENMT: No tonsillar erythema, exudates, or enlargement; Moist mucous membranes, Good dentition, No lesions  NECK: Supple, No JVD, Normal thyroid  CHEST/LUNG: Clear to auscultation bilaterally; No rales, rhonchi, wheezing, or rubs  HEART: Regular rate and rhythm; No murmurs, rubs, or gallops  ABDOMEN: Soft, Nontender, Nondistended; Bowel sounds present  EXTREMITIES:  2+ Peripheral Pulses, No clubbing, cyanosis, or edema  LYMPH: No lymphadenopathy noted  SKIN: No rashes or lesions  NERVOUS SYSTEM:  Motor Strength 4/5 B/L upper and lower extremities; DTRs 2+ intact and symmetric  PSYCH: Alert & Oriented X3, Good concentration.    LABS:              CAPILLARY BLOOD GLUCOSE            RADIOLOGY & ADDITIONAL TESTS:    Imaging Personally Reviewed:  [X] YES  [ ] NO Patient is a 79y old  Female who presents with a chief complaint of Elective right total knee replacement for severe DJD (2020 14:34)      HPI: 79-year-old female with history of hyperlipidemia, GERD, prediabetes, venous insufficiency, anxiety, depression, dizziness, osteoarthritis, who has been having increasing pain in her right knee.  Patient had failed outpatient treatment with conservative management.  She was recommended right total knee arthroplasty.  Patient is seen postoperatively at the request orthopedics for medical comanagement.    At the time my evaluation, patient is complaining of some right knee pain.  She denies any chest pain or chest pressure.  She denies any nausea or vomiting.  She denies any fevers or chills.  She denies any dizziness or syncope.    PAST MEDICAL & SURGICAL HISTORY:  History of vascular disease: venous; has swelling  GERD (gastroesophageal reflux disease)  Dizziness: on and off for years; occasionally lose balance  Osteoarthritis  History of prediabetes  Hyperlipidemia  Depression  Anxiety  S/P cataract surgery: bilateral  S/P tonsillectomy  S/P wrist surgery: right  S/P foot surgery, right: right great toe surgery    Allergies:   adhesives (Pruritus; Rash)  No Known Drug Allergies    Intolerances    lactose (Diarrhea)    Home Medications:  acetaminophen 500 mg oral tablet: 2 tab(s) orally every 12 hours for 2 to 3 weeks. (2020 14:47)  aspirin 81 mg oral delayed release tablet: 1 tab(s) orally once a day (2020 08:06)  Crestor 40 mg oral tablet: 1 tab(s) orally once a day (at bedtime) (2020 08:06)  Lexapro 20 mg oral tablet: 1 tab(s) orally once a day (2020 08:06)  Trileptal 300 mg oral tablet: 1 tab(s) orally 2 times a day (2020 08:06)  Vitamin D3 1000 intl units (25 mcg) oral tablet: 1 tab(s) orally once a day (2020 08:06)      MEDICATIONS  (STANDING):  acetaminophen  IVPB .. 1000 milliGRAM(s) IV Intermittent every 6 hours  atorvastatin 80 milliGRAM(s) Oral at bedtime  ceFAZolin   IVPB 2000 milliGRAM(s) IV Intermittent every 8 hours  celecoxib 200 milliGRAM(s) Oral every 12 hours  escitalopram 20 milliGRAM(s) Oral daily  lactated ringers. 1000 milliLiter(s) (100 mL/Hr) IV Continuous <Continuous>  OXcarbazepine 300 milliGRAM(s) Oral two times a day  polyethylene glycol 3350 17 Gram(s) Oral at bedtime  senna 2 Tablet(s) Oral at bedtime    MEDICATIONS  (PRN):  aluminum hydroxide/magnesium hydroxide/simethicone Suspension 30 milliLiter(s) Oral four times a day PRN Indigestion  HYDROmorphone  Injectable 0.5 milliGRAM(s) IV Push every 3 hours PRN Severe Pain (7 - 10)  magnesium hydroxide Suspension 30 milliLiter(s) Oral daily PRN Constipation  ondansetron Injectable 4 milliGRAM(s) IV Push every 6 hours PRN Nausea and/or Vomiting  oxyCODONE    IR 5 milliGRAM(s) Oral every 3 hours PRN Mild Pain (1 - 3)  oxyCODONE    IR 10 milliGRAM(s) Oral every 3 hours PRN Moderate Pain (4 - 6)      FAMILY HISTORY:  Family history- stomach cancer: father   Family history of vascular disease: sister       Social History: No history of smoking or alcohol abuse.    REVIEW OF SYSTEMS:  CONSTITUTIONAL: No fever, weight loss, or fatigue  EYES: No eye pain, or discharge  ENMT:  No sinus or throat pain  NECK: No pain or stiffness  BREASTS: No pain, masses, or nipple discharge  RESPIRATORY: No cough, wheezing, chills or hemoptysis; No shortness of breath  CARDIOVASCULAR: No chest pain, palpitations, dizziness, or leg swelling  GASTROINTESTINAL: No abdominal or epigastric pain. No nausea, vomiting.  GENITOURINARY: No dysuria, frequency, hematuria, or incontinence  NEUROLOGICAL: No headaches, numbness, or tremors  SKIN: No itching, burning, rashes, or lesions   LYMPH NODES: No enlarged glands  ENDOCRINE: No polydipsia or polyuria  MUSCULOSKELETAL: Positive for right knee pain.  PSYCHIATRIC: No depression, anxiety, mood swings, or difficulty sleeping  HEME/LYMPH: No easy bruising, or bleeding gums  ALLERGY AND IMMUNOLOGIC: No hives or eczema    Vital Signs Last 24 Hrs  T(C): 36.5 (2020 16:15), Max: 36.8 (2020 14:21)  T(F): 97.7 (2020 16:15), Max: 98.2 (2020 14:21)  HR: 68 (2020 16:15) (65 - 78)  BP: 114/66 (2020 16:15) (95/61 - 127/63)  BP(mean): --  RR: 16 (2020 16:15) (14 - 20)  SpO2: 95% (2020 16:15) (95% - 100%)    PHYSICAL EXAM:  GENERAL: NAD, well-groomed, well-developed  HEAD:  Atraumatic, Normocephalic  EYES: EOMI, PERRLA, conjunctiva and sclera clear  ENMT: Moist mucous membranes, Good dentition, No lesions  NECK: Supple, No JVD, Normal thyroid  CHEST/LUNG: Decreased breath sounds the bases, rest is clear.  HEART: Regular rate and rhythm; No murmurs, rubs, or gallops  ABDOMEN: Soft, Nontender, Nondistended; Bowel sounds present  EXTREMITIES:  Right knee dressing is noted.  No calf tenderness noted.  LYMPH: No lymphadenopathy noted  SKIN: No rashes or lesions  NERVOUS SYSTEM:  No focal deficit is noted.  PSYCH: Awake and alert.    LABS:  Complete Blood Count (20 @ 12:02)    Nucleated RBC: 0 /100 WBCs    WBC Count: 5.33 K/uL    RBC Count: 4.61 M/uL    Hemoglobin: 13.8 g/dL    Hematocrit: 41.6 %    Mean Cell Volume: 90.2 fl    Mean Cell Hemoglobin: 29.9 pg    Mean Cell Hemoglobin Conc: 33.2 gm/dL    Red Cell Distrib Width: 13.0 %    Platelet Count - Automated: 218 K/uL    Comprehensive Metabolic Panel (20 @ 12:02)    Sodium, Serum: 143 mmol/L    Potassium, Serum: 4.1 mmol/L    Chloride, Serum: 106 mmol/L    Carbon Dioxide, Serum: 30 mmol/L    Anion Gap, Serum: 7 mmol/L    Blood Urea Nitrogen, Serum: 11 mg/dL    Creatinine, Serum: 0.54 mg/dL    Glucose, Serum: 102 mg/dL    Calcium, Total Serum: 9.0 mg/dL    Protein Total, Serum: 7.7 g/dL    Albumin, Serum: 3.5 g/dL    Bilirubin Total, Serum: 0.3 mg/dL    Alkaline Phosphatase, Serum: 108 U/L    Aspartate Aminotransferase (AST/SGOT): 32 U/L    Alanine Aminotransferase (ALT/SGPT): 41 U/L DA    eGFR if Non : 90: Interpretative comment  The units for eGFR are mL/min/1.73M2 (normalized body surface area). The  eGFR is calculated from a serum creatinine using the CKD-EPI equation.  Other variables required for calculation are race, age and sex. Among  patients with chronic kidney disease (CKD), the eGFR is useful in  determining the stage of disease according to KDOQI CKD classification.  All eGFR results are reported numerically with the following  interpretation.          GFR                    With                 Without     (ml/min/1.73 m2)    Kidney Damage       Kidney Damage        >= 90                    Stage 1                     Normal        60-89                    Stage 2                     Decreased GFR        30-59     Stage 3                     Stage 3        15-29                    Stage 4                     Stage 4        < 15                      Stage 5                     Stage 5  Each stage of CKD assumes that the associated GFR level has been in  effect for at least 3 months. Determination of stages one and two (with  eGFR > 59 ml/min/m2) requires estimation of kidney damage for at least 3  months as defined by structural or functional abnormalities.  Limitations: All estimates of GFR will be less accurate for patients at  extremes of muscle mass (including but not limited to frail elderly,  critically ill, or cancer patients), those with unusual diets, and those  with conditions associated with reduced secretion or extrarenal  elimination of creatinine. The eGFR equation is not recommended for use  in patients with unstable creatinine levels. mL/min/1.73M2    eGFR if African American: 104 mL/min/1.73M2    Prothrombin Time and INR, Plasma (20 @ 12:02)    Prothrombin Time, Plasma: 11.1 sec    INR: 1.01: Recommended ranges for therapeutic INR:    2.0-3.0 for most medical and surgical thromboembolic states    2.0-3.0 for atrial fibrillation    2.0-3.0 for bileaflet mechanical valve in aortic position    2.5-3.5 for mechanical heart valves    Chest 2004;[id number]  The presence of direct thrombin inhibitors (argatroban, refludan)  may falsely increase results. ratio    Activated Partial Thromboplastin Time (20 @ 12:02)    Activated Partial Thromboplastin Time: 29.6 sec    MRSA/MSSA PCR (20 @ 15:42)    MRSA PCR Result.: NotDetec    Staph Aureus PCR Result: NotDetec    < from: 12 Lead ECG (20 @ 11:51) >  Ventricular Rate 69 BPM    Atrial Rate 69 BPM    P-R Interval 130 ms    QRS Duration 90 ms    Q-T Interval 440 ms    QTC Calculation(Bezet) 471 ms    P Axis 29 degrees    R Axis 67 degrees    T Axis 51 degrees    Diagnosis Line Normal sinus rhythm  Normal ECG  No previous ECGs available  Confirmed by BORIS PITTMAN MD ([id number]) on 2020 4:59:38 PM    < end of copied text >    RADIOLOGY & ADDITIONAL TESTS:    Imaging Personally Reviewed:  [X] YES  [ ] NO

## 2020-02-27 NOTE — DISCHARGE NOTE PROVIDER - CARE PROVIDER_API CALL
DEMETRIS Figueredo (MD)  Orthopaedic Surgery  [address], [address]  [place] [address]  Phone: [phone]  Fax: [phone]  Scheduled Appointment: 03/16/2020 11:00 AM

## 2020-02-27 NOTE — CONSULT NOTE ADULT - ASSESSMENT
79-year-old female with history of hyperlipidemia, GERD, prediabetes, venous insufficiency, anxiety, depression, dizziness, osteoarthritis, who has been having increasing pain in her right knee.  Patient had failed outpatient treatment with conservative management.  She was recommended right total knee arthroplasty.  Patient is seen postoperatively at the request orthopedics for medical comanagement.

## 2020-02-27 NOTE — PHYSICAL THERAPY INITIAL EVALUATION ADULT - ADDITIONAL COMMENTS
Patient states that there are 4STE the house. Patient states that her  is able to assist at home if needed. Patient states that she does not have any DME.

## 2020-02-27 NOTE — DISCHARGE NOTE PROVIDER - NSDCCPCAREPLAN_GEN_ALL_CORE_FT
PRINCIPAL DISCHARGE DIAGNOSIS  Diagnosis: DJD (degenerative joint disease) of knee  Assessment and Plan of Treatment: Right knee.  Your new total knee requires proper care.  Your care provider is your best resource for care information.  Please follow these basic guidelines.  Use pain medication if needed as prescribed.  Ice packs are a helpful addition to your comfort.  Applying a  waterproof ice pack over a cloth or thin towel to the site for 30 minutes per hour may provide benefit by reducing swelling and discomfort.  Your Physical Therapy/Occupational Therapy may include ambulation, transfers, stairs, ADL's (activities of daily living), range of motion exercises, and isometrics.  Your participation is vital to your recovery.  -Your Activity  • Weight Bearing as tolerated with rolling walker.  • Take short, frequent walks increasing the distance that you walk each day as tolerated.  • Change your position every hour to decrease pain and stiffness.  • Continue the exercises taught to you by your physical therapist.  • No driving until cleared by the doctor.  • No tub baths, hot tubs, or swimming pools until instructed by your doctor.  • Do not squat down on the floor.  • Do not kneel or twist your knee.  Keep incision clean and dry. Change the dressing daily if there is drainage noted from surgical wound. When there is no drainage, the wound may be left open to air.  Salves, ointments or other topical treatments are not to be used on the wound unless specifically recommended by your doctor.  You have sutures (stiches) and may shower when no drainage is noted from the knee incision allowing water to rinse the incision and pat it  dry afterward with a clean towel.  Suture removal is done in the office 2 weeks after surgery.  If you have further questions or problems call your doctor's office.

## 2020-02-27 NOTE — CONSULT NOTE ADULT - PROBLEM SELECTOR RECOMMENDATION 9
Patient is s/p right total knee arthroplasty, post op day # 0.  Patient will be on Eliquis for DVT prophylaxis.  Multimodal pain management with Tylenol/Celebrex/Oxycodone as needed and Dilaudid as needed.   Physical therapy eval for ambulation and discharge planning.   Encourage incentive spirometry.  Monitor for post op anemia  and post op fever.   Patient wants to go to subacute rehabilitation.

## 2020-02-27 NOTE — DISCHARGE NOTE PROVIDER - NSDCFUSCHEDAPPT_GEN_ALL_CORE_FT
HOMAR DRAKE ; 03/16/2020 ; [facility] HOMAR DRAKE ; 03/16/2020 ; [facility]

## 2020-02-28 ENCOUNTER — TRANSCRIPTION ENCOUNTER (OUTPATIENT)
Age: 80
End: 2020-02-28

## 2020-02-28 DIAGNOSIS — M17.11 UNILATERAL PRIMARY OSTEOARTHRITIS, RIGHT KNEE: ICD-10-CM

## 2020-02-28 DIAGNOSIS — F41.9 ANXIETY DISORDER, UNSPECIFIED: ICD-10-CM

## 2020-02-28 DIAGNOSIS — F32.9 MAJOR DEPRESSIVE DISORDER, SINGLE EPISODE, UNSPECIFIED: ICD-10-CM

## 2020-02-28 DIAGNOSIS — K21.9 GASTRO-ESOPHAGEAL REFLUX DISEASE WITHOUT ESOPHAGITIS: ICD-10-CM

## 2020-02-28 DIAGNOSIS — Z29.9 ENCOUNTER FOR PROPHYLACTIC MEASURES, UNSPECIFIED: ICD-10-CM

## 2020-02-28 DIAGNOSIS — E78.5 HYPERLIPIDEMIA, UNSPECIFIED: ICD-10-CM

## 2020-02-28 LAB
ANION GAP SERPL CALC-SCNC: 7 MMOL/L — SIGNIFICANT CHANGE UP (ref 5–17)
BUN SERPL-MCNC: 13 MG/DL — SIGNIFICANT CHANGE UP (ref 7–23)
CALCIUM SERPL-MCNC: 8.8 MG/DL — SIGNIFICANT CHANGE UP (ref 8.4–10.5)
CHLORIDE SERPL-SCNC: 104 MMOL/L — SIGNIFICANT CHANGE UP (ref 96–108)
CO2 SERPL-SCNC: 27 MMOL/L — SIGNIFICANT CHANGE UP (ref 22–31)
CREAT SERPL-MCNC: 0.84 MG/DL — SIGNIFICANT CHANGE UP (ref 0.5–1.3)
GLUCOSE SERPL-MCNC: 120 MG/DL — HIGH (ref 70–99)
HCT VFR BLD CALC: 33.9 % — LOW (ref 34.5–45)
HGB BLD-MCNC: 11.2 G/DL — LOW (ref 11.5–15.5)
MCHC RBC-ENTMCNC: 29.9 PG — SIGNIFICANT CHANGE UP (ref 27–34)
MCHC RBC-ENTMCNC: 33 GM/DL — SIGNIFICANT CHANGE UP (ref 32–36)
MCV RBC AUTO: 90.4 FL — SIGNIFICANT CHANGE UP (ref 80–100)
NRBC # BLD: 0 /100 WBCS — SIGNIFICANT CHANGE UP (ref 0–0)
PLATELET # BLD AUTO: 205 K/UL — SIGNIFICANT CHANGE UP (ref 150–400)
POTASSIUM SERPL-MCNC: 4.2 MMOL/L — SIGNIFICANT CHANGE UP (ref 3.5–5.3)
POTASSIUM SERPL-SCNC: 4.2 MMOL/L — SIGNIFICANT CHANGE UP (ref 3.5–5.3)
RBC # BLD: 3.75 M/UL — LOW (ref 3.8–5.2)
RBC # FLD: 12.7 % — SIGNIFICANT CHANGE UP (ref 10.3–14.5)
SODIUM SERPL-SCNC: 138 MMOL/L — SIGNIFICANT CHANGE UP (ref 135–145)
WBC # BLD: 9.78 K/UL — SIGNIFICANT CHANGE UP (ref 3.8–10.5)
WBC # FLD AUTO: 9.78 K/UL — SIGNIFICANT CHANGE UP (ref 3.8–10.5)

## 2020-02-28 RX ORDER — HYDROMORPHONE HYDROCHLORIDE 2 MG/ML
2 INJECTION INTRAMUSCULAR; INTRAVENOUS; SUBCUTANEOUS
Refills: 0 | Status: DISCONTINUED | OUTPATIENT
Start: 2020-02-28 | End: 2020-02-29

## 2020-02-28 RX ORDER — HYDROMORPHONE HYDROCHLORIDE 2 MG/ML
1 INJECTION INTRAMUSCULAR; INTRAVENOUS; SUBCUTANEOUS
Refills: 0 | Status: DISCONTINUED | OUTPATIENT
Start: 2020-02-28 | End: 2020-02-29

## 2020-02-28 RX ADMIN — Medication 1000 MILLIGRAM(S): at 11:09

## 2020-02-28 RX ADMIN — APIXABAN 2.5 MILLIGRAM(S): 2.5 TABLET, FILM COATED ORAL at 21:22

## 2020-02-28 RX ADMIN — OXCARBAZEPINE 300 MILLIGRAM(S): 300 TABLET, FILM COATED ORAL at 05:37

## 2020-02-28 RX ADMIN — APIXABAN 2.5 MILLIGRAM(S): 2.5 TABLET, FILM COATED ORAL at 11:08

## 2020-02-28 RX ADMIN — ESCITALOPRAM OXALATE 20 MILLIGRAM(S): 10 TABLET, FILM COATED ORAL at 11:53

## 2020-02-28 RX ADMIN — HYDROMORPHONE HYDROCHLORIDE 2 MILLIGRAM(S): 2 INJECTION INTRAMUSCULAR; INTRAVENOUS; SUBCUTANEOUS at 14:23

## 2020-02-28 RX ADMIN — POLYETHYLENE GLYCOL 3350 17 GRAM(S): 17 POWDER, FOR SOLUTION ORAL at 21:23

## 2020-02-28 RX ADMIN — ATORVASTATIN CALCIUM 80 MILLIGRAM(S): 80 TABLET, FILM COATED ORAL at 21:23

## 2020-02-28 RX ADMIN — Medication 100 MILLIGRAM(S): at 03:20

## 2020-02-28 RX ADMIN — Medication 1000 MILLIGRAM(S): at 11:08

## 2020-02-28 RX ADMIN — OXYCODONE HYDROCHLORIDE 5 MILLIGRAM(S): 5 TABLET ORAL at 03:23

## 2020-02-28 RX ADMIN — HYDROMORPHONE HYDROCHLORIDE 2 MILLIGRAM(S): 2 INJECTION INTRAMUSCULAR; INTRAVENOUS; SUBCUTANEOUS at 21:26

## 2020-02-28 RX ADMIN — CELECOXIB 200 MILLIGRAM(S): 200 CAPSULE ORAL at 22:00

## 2020-02-28 RX ADMIN — HYDROMORPHONE HYDROCHLORIDE 2 MILLIGRAM(S): 2 INJECTION INTRAMUSCULAR; INTRAVENOUS; SUBCUTANEOUS at 18:23

## 2020-02-28 RX ADMIN — PANTOPRAZOLE SODIUM 40 MILLIGRAM(S): 20 TABLET, DELAYED RELEASE ORAL at 05:37

## 2020-02-28 RX ADMIN — OXYCODONE HYDROCHLORIDE 5 MILLIGRAM(S): 5 TABLET ORAL at 04:00

## 2020-02-28 RX ADMIN — Medication 1000 MILLIGRAM(S): at 18:01

## 2020-02-28 RX ADMIN — Medication 1000 MILLIGRAM(S): at 05:41

## 2020-02-28 RX ADMIN — HYDROMORPHONE HYDROCHLORIDE 2 MILLIGRAM(S): 2 INJECTION INTRAMUSCULAR; INTRAVENOUS; SUBCUTANEOUS at 15:24

## 2020-02-28 RX ADMIN — HYDROMORPHONE HYDROCHLORIDE 2 MILLIGRAM(S): 2 INJECTION INTRAMUSCULAR; INTRAVENOUS; SUBCUTANEOUS at 22:00

## 2020-02-28 RX ADMIN — OXCARBAZEPINE 300 MILLIGRAM(S): 300 TABLET, FILM COATED ORAL at 17:58

## 2020-02-28 RX ADMIN — OXYCODONE HYDROCHLORIDE 10 MILLIGRAM(S): 5 TABLET ORAL at 10:05

## 2020-02-28 RX ADMIN — SENNA PLUS 2 TABLET(S): 8.6 TABLET ORAL at 21:23

## 2020-02-28 RX ADMIN — CELECOXIB 200 MILLIGRAM(S): 200 CAPSULE ORAL at 09:25

## 2020-02-28 RX ADMIN — Medication 400 MILLIGRAM(S): at 05:37

## 2020-02-28 RX ADMIN — CELECOXIB 200 MILLIGRAM(S): 200 CAPSULE ORAL at 11:46

## 2020-02-28 RX ADMIN — OXYCODONE HYDROCHLORIDE 10 MILLIGRAM(S): 5 TABLET ORAL at 09:26

## 2020-02-28 RX ADMIN — HYDROMORPHONE HYDROCHLORIDE 2 MILLIGRAM(S): 2 INJECTION INTRAMUSCULAR; INTRAVENOUS; SUBCUTANEOUS at 17:58

## 2020-02-28 RX ADMIN — CELECOXIB 200 MILLIGRAM(S): 200 CAPSULE ORAL at 21:22

## 2020-02-28 NOTE — DISCHARGE NOTE NURSING/CASE MANAGEMENT/SOCIAL WORK - PATIENT PORTAL LINK FT
You can access the FollowMyHealth Patient Portal offered by [facility] by registering at the following website: [email or web address]. By joining [employer]’s FollowMyHealth portal, you will also be able to view your health information using other applications (apps) compatible with our system.

## 2020-02-28 NOTE — OCCUPATIONAL THERAPY INITIAL EVALUATION ADULT - ADDITIONAL COMMENTS
Pt lives with spouse  4 MONALISA Pt lives with spouse  who is unable to assist  4 MONALISA  flight +railing  tub with Hand Held shower on 1st floor. bedroo on 2nd floor

## 2020-02-28 NOTE — PROGRESS NOTE ADULT - SUBJECTIVE AND OBJECTIVE BOX
Orthopedic P.A.- POD# 1 - s/p Right TKR    Patient alert and c/o 8 out of 10 pain in her knee while sitting in chair. Denies nausea.    Vital Signs Last 24 Hrs  T(C): 36.5 (28 Feb 2020 08:46), Max: 37.1 (28 Feb 2020 03:17)  T(F): 97.7 (28 Feb 2020 08:46), Max: 98.7 (28 Feb 2020 03:17)  HR: 67 (28 Feb 2020 09:23) (62 - 78)  BP: 118/68 (28 Feb 2020 09:27) (90/97 - 119/66)  BP(mean): --  RR: 16 (28 Feb 2020 08:46) (14 - 20)  SpO2: 93% (28 Feb 2020 08:46) (93% - 100%)         I&O's Detail    27 Feb 2020 07:01  -  28 Feb 2020 07:00  --------------------------------------------------------  IN:    lactated ringers.: 1500 mL    Oral Fluid: 240 mL  Total IN: 1740 mL    OUT:    Accordian drain right knee: 125 mL since surgery yesterday    Estimated Blood Loss: 150 mL in OR    Voided: 700 mL  Total OUT: 975 mL    Total NET: 765 mL                     Labs:                                                                   11.2<L>  9.78  )-----------( 205      ( 28 Feb 2020 07:11 )             33.9<L>  28 Feb 2020 07:11                                28 Feb 2020 07:11    138    |  104    |  13     ----------------------------<  120<H>  4.2     |  27     |  0.84     Ca    8.8        28 Feb 2020 07:11        MEDICATIONS:acetaminophen   Tablet .. 1000 milliGRAM(s) Oral every 8 hours  aluminum hydroxide/magnesium hydroxide/simethicone Suspension 30 milliLiter(s) Oral four times a day PRN  apixaban 2.5 milliGRAM(s) Oral <User Schedule>  artificial tears (preservative free) Ophthalmic Solution 1 Drop(s) Both EYES four times a day PRN  atorvastatin 80 milliGRAM(s) Oral at bedtime  celecoxib 200 milliGRAM(s) Oral every 12 hours  escitalopram 20 milliGRAM(s) Oral daily  HYDROmorphone   Tablet 1 milliGRAM(s) Oral every 3 hours PRN  HYDROmorphone   Tablet 2 milliGRAM(s) Oral every 3 hours PRN  HYDROmorphone  Injectable 0.5 milliGRAM(s) IV Push every 3 hours PRN  lactated ringers. 1000 milliLiter(s) IV Continuous <Continuous>  magnesium hydroxide Suspension 30 milliLiter(s) Oral daily PRN  ondansetron Injectable 4 milliGRAM(s) IV Push every 6 hours PRN  OXcarbazepine 300 milliGRAM(s) Oral two times a day  pantoprazole    Tablet 40 milliGRAM(s) Oral before breakfast  polyethylene glycol 3350 17 Gram(s) Oral at bedtime  senna 2 Tablet(s) Oral at bedtime    Anticoagulation:  apixaban 2.5 milliGRAM(s) Oral <User Schedule>      Antibiotics: complete      Pain medications:   acetaminophen   Tablet .. 1000 milliGRAM(s) Oral every 8 hours  celecoxib 200 milliGRAM(s) Oral every 12 hours  escitalopram 20 milliGRAM(s) Oral daily  HYDROmorphone   Tablet 1 milliGRAM(s) Oral every 3 hours PRN  HYDROmorphone   Tablet 2 milliGRAM(s) Oral every 3 hours PRN  HYDROmorphone  Injectable 0.5 milliGRAM(s) IV Push every 3 hours PRN  ondansetron Injectable 4 milliGRAM(s) IV Push every 6 hours PRN  OXcarbazepine 300 milliGRAM(s) Oral two times a day                                      Physical Exam:  Right knee- Primary surgical bandage dry and intact. Ace bandages and part of the webril removed and one ace bandage reapplied.  Hemovac drain patent.  Neurovascular grossly intact LE's.  PAS on LE's.  Calves soft and non-tender.                                                                                                                                                          A/P:  Orthopedically stable.  -Continue pain management with above plan.  -DVT prophylaxis with Eliquis 2.5mg po every 12 hours starting today, followed by 28 days of Ecotrin 81mg o every 12 hours.  -Labs ok today; RECheck labs again tomorrow  -Increase ambulation and ROM right knee with PT/OT  -Dr. Weaver for continued medical care  -Discharge planning for Rehab tomorrow.  -Further plan as per attendings.

## 2020-02-28 NOTE — PROGRESS NOTE ADULT - SUBJECTIVE AND OBJECTIVE BOX
Patient is a 79y old  Female who presents with a chief complaint of Right knee pain. (27 Feb 2020 17:21)    HPI:    INTERVAL HPI/OVERNIGHT EVENTS:  Chart reviewed, notes reviewed.  Patient seen and examined.  Pain is fairly controlled with medications.  Ambulated with PT.   Doing incentive spirometry on regular basis.  Pain Location & Control:     PAST MEDICAL & SURGICAL HISTORY:  History of vascular disease: venous; has swelling  GERD (gastroesophageal reflux disease)  Dizziness: on and off for years; occasionally lose balance  Osteoarthritis  History of prediabetes  Hyperlipidemia  Depression  Anxiety  S/P cataract surgery: bilateral  S/P tonsillectomy  S/P wrist surgery: right  S/P foot surgery, right: right great toe surgery      Allergies    adhesives (Pruritus; Rash)  No Known Drug Allergies    Intolerances    lactose (Diarrhea)      Home Medications:  acetaminophen 500 mg oral tablet: 2 tab(s) orally every 12 hours for 2 to 3 weeks. (27 Feb 2020 14:47)  aspirin 81 mg oral delayed release tablet: 1 tab(s) orally once a day (27 Feb 2020 08:06)  Crestor 40 mg oral tablet: 1 tab(s) orally once a day (at bedtime) (27 Feb 2020 08:06)  Lexapro 20 mg oral tablet: 1 tab(s) orally once a day (27 Feb 2020 08:06)  Trileptal 300 mg oral tablet: 1 tab(s) orally 2 times a day (27 Feb 2020 08:06)  Vitamin D3 1000 intl units (25 mcg) oral tablet: 1 tab(s) orally once a day (27 Feb 2020 08:06)      MEDICATIONS  (STANDING):  acetaminophen   Tablet .. 1000 milliGRAM(s) Oral every 8 hours  atorvastatin 80 milliGRAM(s) Oral at bedtime  celecoxib 200 milliGRAM(s) Oral every 12 hours  escitalopram 20 milliGRAM(s) Oral daily  OXcarbazepine 300 milliGRAM(s) Oral two times a day  pantoprazole    Tablet 40 milliGRAM(s) Oral before breakfast  polyethylene glycol 3350 17 Gram(s) Oral at bedtime  senna 2 Tablet(s) Oral at bedtime    MEDICATIONS  (PRN):  aluminum hydroxide/magnesium hydroxide/simethicone Suspension 30 milliLiter(s) Oral four times a day PRN Indigestion  artificial tears (preservative free) Ophthalmic Solution 1 Drop(s) Both EYES four times a day PRN Dry Eyes  HYDROmorphone   Tablet 1 milliGRAM(s) Oral every 3 hours PRN Mild Pain (1 - 3)  HYDROmorphone   Tablet 2 milliGRAM(s) Oral every 3 hours PRN Moderate Pain (4 - 6)  HYDROmorphone  Injectable 0.5 milliGRAM(s) IV Push every 3 hours PRN Severe Pain (7 - 10)  magnesium hydroxide Suspension 30 milliLiter(s) Oral daily PRN Constipation  ondansetron Injectable 4 milliGRAM(s) IV Push every 6 hours PRN Nausea and/or Vomiting      REVIEW OF SYSTEMS:  CONSTITUTIONAL: No fever, weight loss, or fatigue  EYES: No eye pain,  or discharge  ENMT:  No sinus or throat pain  NECK: No pain or stiffness  BREASTS: No pain, masses, or nipple discharge  RESPIRATORY: No cough, wheezing, chills or hemoptysis; No shortness of breath  CARDIOVASCULAR: No chest pain, palpitations, dizziness, or leg swelling  GASTROINTESTINAL: No abdominal or epigastric pain. No nausea, vomiting.  GENITOURINARY: No dysuria, frequency, hematuria, or incontinence  NEUROLOGICAL: No headaches, memory loss, loss of strength, numbness, or tremors  SKIN: No itching, burning, rashes, or lesions   LYMPH NODES: No enlarged glands  ENDOCRINE: No polydipsia or polyuria  MUSCULOSKELETAL: No back pain  PSYCHIATRIC: No depression, anxiety, mood swings.   HEME/LYMPH: No easy bruising, or bleeding gums  ALLERGY AND IMMUNOLOGIC: No hives or eczema    Vital Signs Last 24 Hrs  T(C): 37.4 (28 Feb 2020 19:54), Max: 37.4 (28 Feb 2020 19:54)  T(F): 99.3 (28 Feb 2020 19:54), Max: 99.3 (28 Feb 2020 19:54)  HR: 78 (28 Feb 2020 19:54) (58 - 78)  BP: 102/58 (28 Feb 2020 19:54) (90/97 - 119/58)  BP(mean): --  RR: 17 (28 Feb 2020 19:54) (15 - 17)  SpO2: 93% (28 Feb 2020 19:54) (91% - 94%)    PHYSICAL EXAM:  GENERAL: NAD, well-groomed, well-developed  HEAD:  Atraumatic, Normocephalic  EYES: EOMI, PERRLA, conjunctiva and sclera clear  ENMT: Moist mucous membranes,  No lesions  NECK: Supple,   CHEST/LUNG: Clear to auscultation bilaterally; No rales, rhonchi, wheezing, or rubs  HEART: Regular rate and rhythm; No murmurs, rubs, or gallops  ABDOMEN: Soft, Nontender, Nondistended; Bowel sounds present  EXTREMITIES:  2+ Peripheral Pulses, No clubbing or cyanosis  LYMPH: No lymphadenopathy noted  SKIN: No rashes or lesions  INCISION:  Dressing dry and intact  NERVOUS SYSTEM:  No focal deficit.   PSYCH: AAO X 3, good concentration.     LABS:                        11.2   9.78  )-----------( 205      ( 28 Feb 2020 07:11 )             33.9     28 Feb 2020 07:11    138    |  104    |  13     ----------------------------<  120    4.2     |  27     |  0.84     Ca    8.8        28 Feb 2020 07:11          CAPILLARY BLOOD GLUCOSE          RADIOLOGY & ADDITIONAL TESTS:    Imaging Personally Reviewed:  [ ] YES      Consultant(s) Notes Reviewed:     Care Discussed with Consultants/Other Providers: Patient is a 79y old  Female who presents with a chief complaint of Right knee pain. (27 Feb 2020 17:21)    HPI: 79-year-old female with history of hyperlipidemia, GERD, prediabetes, venous insufficiency, anxiety, depression, dizziness, osteoarthritis, who has been having increasing pain in her right knee.  Patient had failed outpatient treatment with conservative management.  She was recommended right total knee arthroplasty.  Patient is seen postoperatively at the request orthopedics for medical comanagement.    INTERVAL HPI/OVERNIGHT EVENTS:  Chart reviewed, notes reviewed.  Patient seen and examined.  Pain is fairly controlled with medications.  Ambulated with PT.   Doing incentive spirometry on regular basis.  Pain Location & Control: Right knee, controlled.     02/28/2020 --> Doing well.  Patient in the right knee is fairly controlled.  Ambulated with physical therapy.  Denies any chest pain or chest pressure.  Denies any nausea or vomiting.    PAST MEDICAL & SURGICAL HISTORY:  History of vascular disease: venous; has swelling  GERD (gastroesophageal reflux disease)  Dizziness: on and off for years; occasionally lose balance  Osteoarthritis  History of prediabetes  Hyperlipidemia  Depression  Anxiety  S/P cataract surgery: bilateral  S/P tonsillectomy  S/P wrist surgery: right  S/P foot surgery, right: right great toe surgery    Allergies: adhesives (Pruritus; Rash)  No Known Drug Allergies    Intolerances    lactose (Diarrhea)    Home Medications:  acetaminophen 500 mg oral tablet: 2 tab(s) orally every 12 hours for 2 to 3 weeks. (27 Feb 2020 14:47)  aspirin 81 mg oral delayed release tablet: 1 tab(s) orally once a day (27 Feb 2020 08:06)  Crestor 40 mg oral tablet: 1 tab(s) orally once a day (at bedtime) (27 Feb 2020 08:06)  Lexapro 20 mg oral tablet: 1 tab(s) orally once a day (27 Feb 2020 08:06)  Trileptal 300 mg oral tablet: 1 tab(s) orally 2 times a day (27 Feb 2020 08:06)  Vitamin D3 1000 intl units (25 mcg) oral tablet: 1 tab(s) orally once a day (27 Feb 2020 08:06)    MEDICATIONS  (STANDING):  acetaminophen   Tablet .. 1000 milliGRAM(s) Oral every 8 hours  atorvastatin 80 milliGRAM(s) Oral at bedtime  celecoxib 200 milliGRAM(s) Oral every 12 hours  escitalopram 20 milliGRAM(s) Oral daily  OXcarbazepine 300 milliGRAM(s) Oral two times a day  pantoprazole    Tablet 40 milliGRAM(s) Oral before breakfast  polyethylene glycol 3350 17 Gram(s) Oral at bedtime  senna 2 Tablet(s) Oral at bedtime    MEDICATIONS  (PRN):  aluminum hydroxide/magnesium hydroxide/simethicone Suspension 30 milliLiter(s) Oral four times a day PRN Indigestion  artificial tears (preservative free) Ophthalmic Solution 1 Drop(s) Both EYES four times a day PRN Dry Eyes  HYDROmorphone   Tablet 1 milliGRAM(s) Oral every 3 hours PRN Mild Pain (1 - 3)  HYDROmorphone   Tablet 2 milliGRAM(s) Oral every 3 hours PRN Moderate Pain (4 - 6)  HYDROmorphone  Injectable 0.5 milliGRAM(s) IV Push every 3 hours PRN Severe Pain (7 - 10)  magnesium hydroxide Suspension 30 milliLiter(s) Oral daily PRN Constipation  ondansetron Injectable 4 milliGRAM(s) IV Push every 6 hours PRN Nausea and/or Vomiting    REVIEW OF SYSTEMS:  CONSTITUTIONAL: No fever, weight loss, or fatigue  EYES: No eye pain,  or discharge  ENMT:  No sinus or throat pain  NECK: No pain or stiffness  BREASTS: No pain, masses, or nipple discharge  RESPIRATORY: No cough, wheezing, chills or hemoptysis; No shortness of breath  CARDIOVASCULAR: No chest pain, palpitations, dizziness, or leg swelling  GASTROINTESTINAL: No abdominal or epigastric pain. No nausea, vomiting.  GENITOURINARY: No dysuria, frequency, hematuria, or incontinence  NEUROLOGICAL: No headaches, memory loss, loss of strength, numbness, or tremors  SKIN: No itching, burning, rashes, or lesions   LYMPH NODES: No enlarged glands  ENDOCRINE: No polydipsia or polyuria  MUSCULOSKELETAL: Positive for right knee pain.  PSYCHIATRIC: No depression, anxiety, mood swings.   HEME/LYMPH: No easy bruising, or bleeding gums  ALLERGY AND IMMUNOLOGIC: No hives or eczema    Vital Signs Last 24 Hrs  T(C): 37.4 (28 Feb 2020 19:54), Max: 37.4 (28 Feb 2020 19:54)  T(F): 99.3 (28 Feb 2020 19:54), Max: 99.3 (28 Feb 2020 19:54)  HR: 78 (28 Feb 2020 19:54) (58 - 78)  BP: 102/58 (28 Feb 2020 19:54) (90/97 - 119/58)  BP(mean): --  RR: 17 (28 Feb 2020 19:54) (15 - 17)  SpO2: 93% (28 Feb 2020 19:54) (91% - 94%)    PHYSICAL EXAM:  GENERAL: NAD, well-groomed, well-developed  HEAD:  Atraumatic, Normocephalic  EYES: Pallor +.  ENMT: Moist mucous membranes,  No lesions  NECK: Supple,   CHEST/LUNG: Clear to auscultation bilaterally; No rales, rhonchi, wheezing, or rubs  HEART: Regular rate and rhythm; No murmurs, rubs, or gallops  ABDOMEN: Soft, Nontender, Nondistended; Bowel sounds present  EXTREMITIES:  Right knee dressing noted.  LYMPH: No lymphadenopathy noted  SKIN: No rashes or lesions  INCISION:  Dressing dry and intact  NERVOUS SYSTEM:  No focal deficit.   PSYCH: AAO X 3, good concentration.     LABS:                        11.2   9.78  )-----------( 205      ( 28 Feb 2020 07:11 )             33.9     28 Feb 2020 07:11    138    |  104    |  13     ----------------------------<  120    4.2     |  27     |  0.84     Ca    8.8        28 Feb 2020 07:11    RADIOLOGY & ADDITIONAL TESTS:    Imaging Personally Reviewed:  [ ] YES      Consultant(s) Notes Reviewed: Yes    Care Discussed with Consultants/Other Providers: Yes

## 2020-02-29 VITALS
HEART RATE: 74 BPM | TEMPERATURE: 98 F | RESPIRATION RATE: 17 BRPM | SYSTOLIC BLOOD PRESSURE: 100 MMHG | OXYGEN SATURATION: 91 % | DIASTOLIC BLOOD PRESSURE: 65 MMHG

## 2020-02-29 DIAGNOSIS — D50.0 IRON DEFICIENCY ANEMIA SECONDARY TO BLOOD LOSS (CHRONIC): ICD-10-CM

## 2020-02-29 LAB
ANION GAP SERPL CALC-SCNC: 6 MMOL/L — SIGNIFICANT CHANGE UP (ref 5–17)
BUN SERPL-MCNC: 17 MG/DL — SIGNIFICANT CHANGE UP (ref 7–23)
CALCIUM SERPL-MCNC: 8.5 MG/DL — SIGNIFICANT CHANGE UP (ref 8.4–10.5)
CHLORIDE SERPL-SCNC: 103 MMOL/L — SIGNIFICANT CHANGE UP (ref 96–108)
CO2 SERPL-SCNC: 29 MMOL/L — SIGNIFICANT CHANGE UP (ref 22–31)
CREAT SERPL-MCNC: 0.74 MG/DL — SIGNIFICANT CHANGE UP (ref 0.5–1.3)
GLUCOSE SERPL-MCNC: 114 MG/DL — HIGH (ref 70–99)
HCT VFR BLD CALC: 33 % — LOW (ref 34.5–45)
HGB BLD-MCNC: 10.9 G/DL — LOW (ref 11.5–15.5)
MCHC RBC-ENTMCNC: 30.2 PG — SIGNIFICANT CHANGE UP (ref 27–34)
MCHC RBC-ENTMCNC: 33 GM/DL — SIGNIFICANT CHANGE UP (ref 32–36)
MCV RBC AUTO: 91.4 FL — SIGNIFICANT CHANGE UP (ref 80–100)
NRBC # BLD: 0 /100 WBCS — SIGNIFICANT CHANGE UP (ref 0–0)
PLATELET # BLD AUTO: 207 K/UL — SIGNIFICANT CHANGE UP (ref 150–400)
POTASSIUM SERPL-MCNC: 3.9 MMOL/L — SIGNIFICANT CHANGE UP (ref 3.5–5.3)
POTASSIUM SERPL-SCNC: 3.9 MMOL/L — SIGNIFICANT CHANGE UP (ref 3.5–5.3)
RBC # BLD: 3.61 M/UL — LOW (ref 3.8–5.2)
RBC # FLD: 13.2 % — SIGNIFICANT CHANGE UP (ref 10.3–14.5)
SODIUM SERPL-SCNC: 138 MMOL/L — SIGNIFICANT CHANGE UP (ref 135–145)
WBC # BLD: 9.03 K/UL — SIGNIFICANT CHANGE UP (ref 3.8–10.5)
WBC # FLD AUTO: 9.03 K/UL — SIGNIFICANT CHANGE UP (ref 3.8–10.5)

## 2020-02-29 PROCEDURE — 88305 TISSUE EXAM BY PATHOLOGIST: CPT

## 2020-02-29 PROCEDURE — C1713: CPT

## 2020-02-29 PROCEDURE — 97116 GAIT TRAINING THERAPY: CPT

## 2020-02-29 PROCEDURE — C1889: CPT

## 2020-02-29 PROCEDURE — 97161 PT EVAL LOW COMPLEX 20 MIN: CPT

## 2020-02-29 PROCEDURE — 97110 THERAPEUTIC EXERCISES: CPT

## 2020-02-29 PROCEDURE — 80048 BASIC METABOLIC PNL TOTAL CA: CPT

## 2020-02-29 PROCEDURE — 85018 HEMOGLOBIN: CPT

## 2020-02-29 PROCEDURE — 85014 HEMATOCRIT: CPT

## 2020-02-29 PROCEDURE — C1776: CPT

## 2020-02-29 PROCEDURE — 36415 COLL VENOUS BLD VENIPUNCTURE: CPT

## 2020-02-29 PROCEDURE — 88311 DECALCIFY TISSUE: CPT

## 2020-02-29 PROCEDURE — 97165 OT EVAL LOW COMPLEX 30 MIN: CPT

## 2020-02-29 PROCEDURE — 85027 COMPLETE CBC AUTOMATED: CPT

## 2020-02-29 PROCEDURE — 73562 X-RAY EXAM OF KNEE 3: CPT

## 2020-02-29 RX ORDER — HYDROMORPHONE HYDROCHLORIDE 2 MG/ML
1 INJECTION INTRAMUSCULAR; INTRAVENOUS; SUBCUTANEOUS
Qty: 42 | Refills: 0
Start: 2020-02-29 | End: 2020-03-06

## 2020-02-29 RX ORDER — APIXABAN 2.5 MG/1
1 TABLET, FILM COATED ORAL
Qty: 0 | Refills: 0 | DISCHARGE
Start: 2020-02-29

## 2020-02-29 RX ORDER — APIXABAN 2.5 MG/1
1 TABLET, FILM COATED ORAL
Qty: 24 | Refills: 0
Start: 2020-02-29 | End: 2020-03-11

## 2020-02-29 RX ORDER — CELECOXIB 200 MG/1
1 CAPSULE ORAL
Qty: 60 | Refills: 0
Start: 2020-02-29

## 2020-02-29 RX ORDER — HYDROMORPHONE HYDROCHLORIDE 2 MG/ML
1 INJECTION INTRAMUSCULAR; INTRAVENOUS; SUBCUTANEOUS
Qty: 0 | Refills: 0 | DISCHARGE
Start: 2020-02-29

## 2020-02-29 RX ORDER — CELECOXIB 200 MG/1
1 CAPSULE ORAL
Qty: 0 | Refills: 0 | DISCHARGE
Start: 2020-02-29

## 2020-02-29 RX ADMIN — HYDROMORPHONE HYDROCHLORIDE 2 MILLIGRAM(S): 2 INJECTION INTRAMUSCULAR; INTRAVENOUS; SUBCUTANEOUS at 10:34

## 2020-02-29 RX ADMIN — HYDROMORPHONE HYDROCHLORIDE 2 MILLIGRAM(S): 2 INJECTION INTRAMUSCULAR; INTRAVENOUS; SUBCUTANEOUS at 02:57

## 2020-02-29 RX ADMIN — Medication 1000 MILLIGRAM(S): at 11:36

## 2020-02-29 RX ADMIN — CELECOXIB 200 MILLIGRAM(S): 200 CAPSULE ORAL at 10:04

## 2020-02-29 RX ADMIN — PANTOPRAZOLE SODIUM 40 MILLIGRAM(S): 20 TABLET, DELAYED RELEASE ORAL at 06:07

## 2020-02-29 RX ADMIN — Medication 1000 MILLIGRAM(S): at 02:57

## 2020-02-29 RX ADMIN — HYDROMORPHONE HYDROCHLORIDE 2 MILLIGRAM(S): 2 INJECTION INTRAMUSCULAR; INTRAVENOUS; SUBCUTANEOUS at 10:04

## 2020-02-29 RX ADMIN — APIXABAN 2.5 MILLIGRAM(S): 2.5 TABLET, FILM COATED ORAL at 10:04

## 2020-02-29 RX ADMIN — Medication 1000 MILLIGRAM(S): at 11:34

## 2020-02-29 RX ADMIN — HYDROMORPHONE HYDROCHLORIDE 2 MILLIGRAM(S): 2 INJECTION INTRAMUSCULAR; INTRAVENOUS; SUBCUTANEOUS at 03:30

## 2020-02-29 RX ADMIN — ESCITALOPRAM OXALATE 20 MILLIGRAM(S): 10 TABLET, FILM COATED ORAL at 11:36

## 2020-02-29 RX ADMIN — Medication 1000 MILLIGRAM(S): at 03:15

## 2020-02-29 RX ADMIN — OXCARBAZEPINE 300 MILLIGRAM(S): 300 TABLET, FILM COATED ORAL at 06:07

## 2020-02-29 RX ADMIN — CELECOXIB 200 MILLIGRAM(S): 200 CAPSULE ORAL at 10:05

## 2020-02-29 NOTE — PROGRESS NOTE ADULT - ASSESSMENT
79-year-old female with history of hyperlipidemia, GERD, prediabetes, venous insufficiency, anxiety, depression, dizziness, osteoarthritis, who has been having increasing pain in her right knee.  Patient had failed outpatient treatment with conservative management.  She was recommended right total knee arthroplasty.  Patient is seen postoperatively at the request orthopedics for medical comanagement.
79-year-old female with history of hyperlipidemia, GERD, prediabetes, venous insufficiency, anxiety, depression, dizziness, osteoarthritis, who has been having increasing pain in her right knee.  Patient had failed outpatient treatment with conservative management.  She was recommended right total knee arthroplasty.  Patient is seen postoperatively at the request orthopedics for medical comanagement.

## 2020-02-29 NOTE — PROGRESS NOTE ADULT - SUBJECTIVE AND OBJECTIVE BOX
Patient is a 79y old  Female who presents with a chief complaint of Right knee pain. (27 Feb 2020 17:21)    HPI: 79-year-old female with history of hyperlipidemia, GERD, prediabetes, venous insufficiency, anxiety, depression, dizziness, osteoarthritis, who has been having increasing pain in her right knee.  Patient had failed outpatient treatment with conservative management.  She was recommended right total knee arthroplasty.  Patient is seen postoperatively at the request orthopedics for medical comanagement.    INTERVAL HPI/OVERNIGHT EVENTS:  Chart reviewed, notes reviewed.  Patient seen and examined.  Pain is fairly controlled with medications.  Ambulated with PT.   Doing incentive spirometry on regular basis.  Pain Location & Control: Right knee, controlled.     02/28/2020 --> Doing well.  Patient in the right knee is fairly controlled.  Ambulated with physical therapy.  Denies any chest pain or chest pressure.  Denies any nausea or vomiting.    02/29/2020 --> Pain is fairly controlled. Denies any chest pain or pressure. No nausea or vomiting. No fever or chills.     PAST MEDICAL & SURGICAL HISTORY:  History of vascular disease: venous; has swelling  GERD (gastroesophageal reflux disease)  Dizziness: on and off for years; occasionally lose balance  Osteoarthritis  History of prediabetes  Hyperlipidemia  Depression  Anxiety  S/P cataract surgery: bilateral  S/P tonsillectomy  S/P wrist surgery: right  S/P foot surgery, right: right great toe surgery    Allergies: adhesives (Pruritus; Rash)  No Known Drug Allergies    Intolerances    lactose (Diarrhea)    Home Medications:  acetaminophen 500 mg oral tablet: 2 tab(s) orally every 12 hours for 2 to 3 weeks. (27 Feb 2020 14:47)  aspirin 81 mg oral delayed release tablet: 1 tab(s) orally once a day (27 Feb 2020 08:06)  Crestor 40 mg oral tablet: 1 tab(s) orally once a day (at bedtime) (27 Feb 2020 08:06)  Lexapro 20 mg oral tablet: 1 tab(s) orally once a day (27 Feb 2020 08:06)  Trileptal 300 mg oral tablet: 1 tab(s) orally 2 times a day (27 Feb 2020 08:06)  Vitamin D3 1000 intl units (25 mcg) oral tablet: 1 tab(s) orally once a day (27 Feb 2020 08:06)    MEDICATIONS  (STANDING):  acetaminophen   Tablet .. 1000 milliGRAM(s) Oral every 8 hours  apixaban 2.5 milliGRAM(s) Oral every 12 hours  atorvastatin 80 milliGRAM(s) Oral at bedtime  celecoxib 200 milliGRAM(s) Oral every 12 hours  escitalopram 20 milliGRAM(s) Oral daily  OXcarbazepine 300 milliGRAM(s) Oral two times a day  pantoprazole    Tablet 40 milliGRAM(s) Oral before breakfast  polyethylene glycol 3350 17 Gram(s) Oral at bedtime  senna 2 Tablet(s) Oral at bedtime    MEDICATIONS  (PRN):  aluminum hydroxide/magnesium hydroxide/simethicone Suspension 30 milliLiter(s) Oral four times a day PRN Indigestion  artificial tears (preservative free) Ophthalmic Solution 1 Drop(s) Both EYES four times a day PRN Dry Eyes  bisacodyl Suppository 10 milliGRAM(s) Rectal daily PRN If no bowel movement by postoperative day #2  HYDROmorphone   Tablet 1 milliGRAM(s) Oral every 3 hours PRN Mild Pain (1 - 3)  HYDROmorphone   Tablet 2 milliGRAM(s) Oral every 3 hours PRN Moderate Pain (4 - 6)  HYDROmorphone  Injectable 0.5 milliGRAM(s) IV Push every 3 hours PRN Severe Pain (7 - 10)  magnesium hydroxide Suspension 30 milliLiter(s) Oral daily PRN Constipation  ondansetron Injectable 4 milliGRAM(s) IV Push every 6 hours PRN Nausea and/or Vomiting    REVIEW OF SYSTEMS:  CONSTITUTIONAL: No fever, weight loss, or fatigue  EYES: No eye pain,  or discharge  ENMT:  No sinus or throat pain  NECK: No pain or stiffness  BREASTS: No pain, masses, or nipple discharge  RESPIRATORY: No cough, wheezing, chills or hemoptysis; No shortness of breath  CARDIOVASCULAR: No chest pain, palpitations, dizziness, or leg swelling  GASTROINTESTINAL: No abdominal or epigastric pain. No nausea, vomiting.  GENITOURINARY: No dysuria, frequency, hematuria, or incontinence  NEUROLOGICAL: No headaches, memory loss, loss of strength, numbness, or tremors  SKIN: No itching, burning, rashes, or lesions   LYMPH NODES: No enlarged glands  ENDOCRINE: No polydipsia or polyuria  MUSCULOSKELETAL: Positive for right knee pain.  PSYCHIATRIC: No depression, anxiety, mood swings.   HEME/LYMPH: No easy bruising, or bleeding gums  ALLERGY AND IMMUNOLOGIC: No hives or eczema    Vital Signs Last 24 Hrs  T(C): 36.8 (29 Feb 2020 07:59), Max: 37.4 (28 Feb 2020 19:54)  T(F): 98.3 (29 Feb 2020 07:59), Max: 99.3 (28 Feb 2020 19:54)  HR: 75 (29 Feb 2020 07:59) (58 - 95)  BP: 90/48 (29 Feb 2020 07:59) (90/48 - 119/58)  BP(mean): --  RR: 18 (29 Feb 2020 07:59) (15 - 18)  SpO2: 98% (29 Feb 2020 07:59) (91% - 98%)    PHYSICAL EXAM:  GENERAL: NAD, well-groomed, well-developed  HEAD:  Atraumatic, Normocephalic  EYES: Pallor +.  ENMT: Moist mucous membranes,  No lesions  NECK: Supple,   CHEST/LUNG: Clear to auscultation bilaterally; No rales, rhonchi, wheezing, or rubs  HEART: Regular rate and rhythm; No murmurs, rubs, or gallops  ABDOMEN: Soft, Nontender, Nondistended; Bowel sounds present  EXTREMITIES:  Right knee dressing noted.  LYMPH: No lymphadenopathy noted  SKIN: No rashes or lesions  INCISION:  Dressing dry and intact  NERVOUS SYSTEM:  No focal deficit.   PSYCH: AAO X 3, good concentration.     LABS:             10.9   9.03  )-----------( 207      ( 29 Feb 2020 07:13 )             33.0     29 Feb 2020 07:13    138    |  103    |  17     ----------------------------<  114    3.9     |  29     |  0.74     Ca    8.5        29 Feb 2020 07:13    02-11 [id number] 6.0    RADIOLOGY & ADDITIONAL TESTS:    Imaging Personally Reviewed:  [ ] YES      Consultant(s) Notes Reviewed: Yes    Care Discussed with Consultants/Other Providers: Yes

## 2020-02-29 NOTE — PROGRESS NOTE ADULT - PROBLEM SELECTOR PLAN 4
Continue patient on Protonix for GERD and GI prophylaxis.
Continue patient on Protonix for GERD and GI prophylaxis.

## 2020-02-29 NOTE — PROGRESS NOTE ADULT - PROBLEM SELECTOR PLAN 6
Eliquis for DVT prophylaxis.  Protonix for GI prophylaxis.
Eliquis for DVT prophylaxis.  Protonix for GI prophylaxis.

## 2020-02-29 NOTE — PROGRESS NOTE ADULT - PROBLEM SELECTOR PLAN 1
Patient is s/p right total knee arthroplasty, post op day # 2.  Continue patient on Eliquis for DVT prophylaxis.  Continue multimodal pain management with Tylenol/Celebrex and Dilaudid as needed.   Continue physical therapy.  Encourage incentive spirometry.  Monitor for post op anemia  and post op fever.   Medically stable for discharge to subacute rehabilitation.
Patient is s/p right total knee arthroplasty, post op day # 1.  Continue patient on Eliquis for DVT prophylaxis.  Continue multimodal pain management with Tylenol/Celebrex and Dilaudid as needed.   Continue physical therapy.  Encourage incentive spirometry.  Monitor for post op anemia  and post op fever.   Plan is for discharge to subacute rehabilitation.

## 2020-02-29 NOTE — PROGRESS NOTE ADULT - PROBLEM SELECTOR PLAN 3
Continue Lipitor for Crestor.   Resume Crestor on discharge.
Continue Lipitor for Crestor.   Resume Crestor on discharge.

## 2020-02-29 NOTE — PROGRESS NOTE ADULT - PROBLEM SELECTOR PLAN 2
Patient with anemia of acute postop blood loss, mild.   Patient is asymptomatic.  Continue to monitor serial CBC and transfuse as needed.
Patient with anemia of acute postop blood loss, mild.   Patient is asymptomatic.  Continue to monitor serial CBC and transfuse as needed.

## 2020-03-16 ENCOUNTER — APPOINTMENT (OUTPATIENT)
Dept: ORTHOPEDIC SURGERY | Facility: CLINIC | Age: 80
End: 2020-03-16

## 2020-03-24 PROBLEM — Z86.79 PERSONAL HISTORY OF OTHER DISEASES OF THE CIRCULATORY SYSTEM: Chronic | Status: ACTIVE | Noted: 2020-02-11

## 2020-03-24 PROBLEM — E78.5 HYPERLIPIDEMIA, UNSPECIFIED: Chronic | Status: ACTIVE | Noted: 2020-02-11

## 2020-03-24 PROBLEM — K21.9 GASTRO-ESOPHAGEAL REFLUX DISEASE WITHOUT ESOPHAGITIS: Chronic | Status: ACTIVE | Noted: 2020-02-11

## 2020-03-30 ENCOUNTER — APPOINTMENT (OUTPATIENT)
Dept: ORTHOPEDIC SURGERY | Facility: CLINIC | Age: 80
End: 2020-03-30
Payer: MEDICARE

## 2020-03-30 PROCEDURE — 99024 POSTOP FOLLOW-UP VISIT: CPT

## 2020-03-30 PROCEDURE — 73562 X-RAY EXAM OF KNEE 3: CPT | Mod: RT

## 2020-03-30 NOTE — HISTORY OF PRESENT ILLNESS
[de-identified] : right total knee mail Feb 27\par  [de-identified] : denies fever chills\par mild intermittent burning  right leg\par ambulating with a cane \par discharged from [facility] [de-identified] : alert no distress\par right knee;healed C/D/I/\par prom 5/100 no calf tenderness ligaments intact [de-identified] : xray right knee ap lat sunrise:satisfactory post operative appearance components and global alignment [de-identified] : right total knee  [de-identified] : PT re exam 1 month

## 2020-03-30 NOTE — HISTORY OF PRESENT ILLNESS
[de-identified] : right total knee mail Feb 27\par  [de-identified] : denies fever chills\par mild intermittent burning  right leg\par ambulating with a cane \par discharged from [facility] [de-identified] : alert no distress\par right knee;healed C/D/I/\par prom 5/100 no calf tenderness ligaments intact [de-identified] : xray right knee ap lat sunrise:satisfactory post operative appearance components and global alignment [de-identified] : right total knee  [de-identified] : PT re exam 1 month

## 2020-04-03 NOTE — DISCUSSION/SUMMARY
[de-identified] : Patient presents with right knee endstage OA. We discussed at length the nature of the patient's condition. At this time, the patient has failed conservative management including ice, NSAIDs, physical therapy, exercise limitations, and injections and is a candidate for surgery based on xrays and quality of life. We have discussed the risks, benefits, and alternatives to surgery. \par \par 1) I reviewed the plan of care as well as a model of a knee implant equivalent to the one that will be used for their total knee joint replacement\par 2) The patient agreed to the plan of care as well as the use of implants for their total knee replacement. \par \par Impression:\par right knee OA\par \par Plan: \par right TKR

## 2020-04-03 NOTE — HISTORY OF PRESENT ILLNESS
[de-identified] : 79 year old female presents chronic spontaneous onset right knee pain medial aspect. She states that she has pain with weightbearing and ambulating. When in bed, certain shifts in position can cause pain. Recently when standing on an angled floor for an extended period of time, had intense knee pain. She has undergone multiple courses of cortisone injections over a 2 year span with Dr. lopez. The first injection provided sustained relief, but less pain relief with each subsequent injection. The most recent injection was 1 month ago. compromised quality of lfie Constitutional:Well nourished , well developed and in no acute distress\par Psychiatric: Alert and oriented to time place and person.Appropriate affect\par Respiratory: Unlabored respirations,no audible wheezing\par Cardiovascular: no leg swelling  ankle edema\par Vascular: no calf or thigh tenderness, \par Peripheral pulses;posterior tibial[right/left,]dorsal pedal[right/left]\par Skin:Head, neck, arms and lower extremities:no lesions or discoloration\par Lymphatics:No groin adenopathy\par Neurological: intact light touch sensation and grossly intact coordination and motor power.\par Knee;Right: \par Gait;\par Inspection: No swelling or erythema\par Wounds: [Surgical i ncision healed] none\par Palpation: Effusion[non-/1+]Tenderness:[Non-][medial joint line/lateral joint line/popliteal fossa/tibial tubercle/quadriceps tendon/medial patella/lateral patella\par Range of motion: Active[default value]]\par Crepitus:[Non-][mild/moderate/]\par Ligaments: Anterior drawer[positive/negative] posterior drawer[positive/negative] Lachman maneuver[positive/negative] valgus stress[intact] varus stress[intact\par Meniscus: Valeria maneuver[negative/positive/deferred]\par Patellofemoral: Q angle[normal/increased] compression maneuver[positive/negative]\par Extensor mechanism: Quadriceps tendon and patellar tendon intact\par Knee;Left:\par Gait;\par Inspection: No swelling or erythema\par Wounds: [Surgical i ncision healed] none\par Palpation: Effusion[non-/1+]Tenderness:[Non-][medial joint line/lateral joint line/popliteal fossa/tibial tubercle/quadriceps tendon/medial patella/lateral patella\par Range of motion: Active[default value]]\par Crepitus:[Non-][mild/moderate/]\par Ligaments: Anterior drawer[positive/negative] posterior drawer[positive/negative] Lachman maneuver[positive/negative] valgus stress[intact] varus stress[intact\par Meniscus: Valreia maneuver[negative/positive/deferred]\par Patellofemoral: Q angle[normal/increased] compression maneuver[positive/negative]\par Extensor mechanism: Quadriceps tendon and patellar tendon intact\par Hips: Passive range of motion satisfactory pain-free\par

## 2020-04-03 NOTE — DISCUSSION/SUMMARY
[de-identified] : The patient presents with right knee OA and Pain. We discussed the nature of the condition and treatment options. I elucidated the conservative treatment options including weight loss, injections, pain medication, and low impact exercises. The patient is a candidate for surgery based on imaging and quality of life. We discussed risks, benefits and alternatives to surgery. The patient was informed she must wait 3 months before proceeding with surgery, due to a recent knee injection. \par \par Plan:\par circulation doctor. PT.\par \par \par 1) I reviewed the plan of care as well as a model of a knee implant equivalent to the one that will be used for their total knee joint replacement\par 2) The patient agreed to the plan of care as well as the use of implants for their total knee replacement.\par \par The patient was seen in my office regarding right knee pain. As a part of that visit, I recommended treatment with a ROM knee orthosis due to restrictions my patient is experiencing with functional limitations of daily activities. \par \par This ROM knee orthosis, in conjunction with other previously prescribed regimens will assist in overall treatment of my patient's condition. \par pre op vascular clearance to rule out  pvd\par Dr. Figueredo\par \par

## 2020-04-03 NOTE — PHYSICAL EXAM
[de-identified] : Well-nourished, in no acute distress\par Alert and oriented to time, place and person\par Skin: no lesions discoloration\par Respirations: unlabored\par Cardiac: no leg swelling\par Lymphatic: no groin adenopathy\par Right knee: no effusion, equivalent leg lengths, ligaments intact, capillary refill normal  prom 5/90 [de-identified] : xray right knee medial compartment degenerative narrowing varus alignment bone on bone subchondral sclerosis

## 2020-04-03 NOTE — ADDENDUM
[FreeTextEntry1] : Documented by Jovana Kirkland acting solely as a scribe for Dr. Denzel Figueredo on 02/03/2020.\par \par All medical record entries made by the Scribe were at my, Dr. Denzel Figueredo, direction and personally dictated by me on 02/03/2020. I have personally reviewed the chart and agree that the record accurately reflects my personal performance of the history, physical exam, assessment and plan.\par

## 2020-04-03 NOTE — HISTORY OF PRESENT ILLNESS
[de-identified] : 79 year old female presents with constant right knee pain medial aspect. She reports that the pain is constant during the day and wakes her up during the night. She notes that the pain fluctuates in severity and can be "burning" in quality. She ambulates with a cane daily. She has undergone multiple courses of cortisone injections over a 2 year span with Dr. Ziegler. The first injection provided sustained relief, but less pain relief with each subsequent injection. The pain continues to compromise her quality of life and limit normal daily activities including cooking. She was seen for vascular evaluation with Dr. Montaño and is here to discuss surgical intervention.

## 2020-04-03 NOTE — PHYSICAL EXAM
[de-identified] : Well-nourished, in no acute distress\par Alert and oriented to time, place and person\par Skin: no lesions discoloration\par Respirations: unlabored\par Cardiac: no leg swelling\par Lymphatic: no groin adenopathy\par right knee: dorsal pedal and posterior tibialis pulse nonpalpable, capillary refill normal, varus, no effusion, 0-115 degrees , ligaments intact  [de-identified] : AP, notch standing, lateral and sunrise Xrays of the right knee taken in the office today demonstrates medial compartment degenerative narrowing.varus alignment

## 2020-04-04 NOTE — HISTORY OF PRESENT ILLNESS
[de-identified] : 79 year old female presents with constant right knee pain medial aspect. She reports that the pain is constant during the day and wakes her up during the night. She notes that the pain fluctuates in severity and can be "burning" in quality. She ambulates with a cane daily. She has undergone multiple courses of cortisone injections over a 2 year span with Dr. Ziegler. The first injection provided sustained relief, but less pain relief with each subsequent injection. The pain continues to compromise her quality of life and limit normal daily activities including cooking. She was seen for vascular evaluation with Dr. Montaño and is here to discuss surgical intervention. Ambulating freely.\par \par

## 2020-04-04 NOTE — CONSULT LETTER
[Dear  ___] : Dear  [unfilled], [Sincerely,] : Sincerely, [Consult Letter:] : I had the pleasure of evaluating your patient, [unfilled]. [Please see my note below.] : Please see my note below. [FreeTextEntry3] : Dr. Figueredo

## 2020-04-04 NOTE — PHYSICAL EXAM
[de-identified] : Well-nourished, in no acute distress\par Alert and oriented to time, place and person\par Skin: no lesions discoloration\par Respirations: unlabored\par Cardiac: no leg swelling\par Lymphatic: no groin adenopathy\par Right knee: Operative site skin intact [de-identified] : AP, notch standing, lateral and sunrise Xrays of the right knee taken in the office today 02/03/2020 demonstrates medial compartment degenerative narrowing varus alignment bone on bone subchondral sclerosis

## 2020-04-04 NOTE — DISCUSSION/SUMMARY
[de-identified] : Patient presents with right knee endstage OA. We discussed at length the nature of the patient's condition. At this time, the patient has failed conservative management including ice, NSAIDs, physical therapy, exercise limitations, and injections and is a candidate for surgery based on xrays and quality of life. We have discussed the risks, benefits, and alternatives to surgery. \par \par 1) I reviewed the plan of care as well as a model of a knee implant equivalent to the one that will be used for their total knee joint replacement\par 2) The patient agreed to the plan of care as well as the use of implants for their total knee replacement. \par \par Impression:\par right knee OA\par \par Plan: \par right TKR

## 2020-04-04 NOTE — ADDENDUM
[FreeTextEntry1] : I, Kwadwo Mendes, acted solely as a scribe for Dr. Denzel Figueredo on 02/24/2020  .\par  \par All medical record entries made by the scribe were at my, Dr. Denzel Figueredo, direction and personally dictated by me on 02/24/2020. I have reviewed the chart and agree that the record accurately reflects my personal performance of the history, physical exam, assessment and plan. I have also personally directed, reviewed, and agreed with the chart.

## 2020-04-20 ENCOUNTER — APPOINTMENT (OUTPATIENT)
Dept: ORTHOPEDIC SURGERY | Facility: CLINIC | Age: 80
End: 2020-04-20
Payer: MEDICARE

## 2020-04-20 VITALS — TEMPERATURE: 97.8 F

## 2020-04-20 PROCEDURE — 99024 POSTOP FOLLOW-UP VISIT: CPT

## 2020-04-20 NOTE — DISCUSSION/SUMMARY
[de-identified] : Impression: right total knee replacement with sterile stitch abscess  healing\par Plan: instructed on wound care \par HEP\par re exam 3 weeks

## 2020-04-20 NOTE — PHYSICAL EXAM
[de-identified] : well nourished in no distress\par alert and oriented times three\par right knee; neutral neurovascular intact pinpoint sinus  incision proximal and distal third no effusion prom 5/90 ligaments intact  no discharge fluctuance tenderness

## 2020-04-20 NOTE — HISTORY OF PRESENT ILLNESS
[de-identified] : right total knee feb 29[birth year]20220\par reports intermittent knee stiffness.\par recently localized erythema and swelling  in the incision line that resolve following small area of discharge\par denies fever chills locking or giving out

## 2020-05-18 ENCOUNTER — APPOINTMENT (OUTPATIENT)
Dept: ORTHOPEDIC SURGERY | Facility: CLINIC | Age: 80
End: 2020-05-18
Payer: MEDICARE

## 2020-05-18 DIAGNOSIS — Z96.651 PRESENCE OF RIGHT ARTIFICIAL KNEE JOINT: ICD-10-CM

## 2020-05-18 PROCEDURE — 99024 POSTOP FOLLOW-UP VISIT: CPT

## 2020-05-18 NOTE — DISCUSSION/SUMMARY
[de-identified] : Impression right total knee replacement\par Plan continue therapy followup 2 months

## 2020-05-18 NOTE — HISTORY OF PRESENT ILLNESS
[de-identified] : Patient reports decreasing intensity chronic intermittent pain medial aspect right knee. Denies swelling locking giving out. Undergoing therapy and walking with a cane. Is now able to negotiate stairs normally.

## 2020-05-18 NOTE — PHYSICAL EXAM
[de-identified] : Constitutional:Well nourished , well developed and in no acute distress\par Psychiatric: Alert and oriented to time place and person.Appropriate affect\par Respiratory: Unlabored respirations,no audible wheezing\par Cardiovascular: no leg swelling  ankle edema\par Vascular: no calf or thigh tenderness, \par Peripheral pulses;posterior tibial[right/left,]dorsal pedal[right/left]\par Skin:Head, neck, arms and lower extremities:no lesions or discoloration\par Lymphatics:No groin adenopathy\par Neurological: intact light touch sensation and grossly intact coordination and motor power.\par Knee;Right: \par Gait; normal\par Inspection: No swelling or erythema\par Wounds: Incision healed none\par Palpation: Effusion none Tenderness:[PES anserine patella\par Range of motion: 5/500 Crepitus: None [mild/moderate/]\par Inspection: no erythema noted, no swelling noted.\par Palpation: no pain on palpation .\par ROM: FROM without crepitus.\par Muscle strength: 5/5\par Stability: no instability noted.\par Right ankle;\par Inspection: no erythema noted, no swelling noted.\par ROM: FROM without crepitus.\par Palpation: no pain on palpation .\par Muscle strength: 5/5.\par Stability: no instability noted.\par Left foot;\par Inspection: color, texture and turgor are normal.\par ROM: full range of motion of all joints without pain or crepitus.\par Palpation: no tenderness.\par Stability: no instability noted.\par Right foot;\par Inspection: color, texture and turgor are normal.\par ROM: full range of motion of all joints without pain or crepitus.\par Palpation: no tenderness.\par Stability: no instability noted. \par Left shoulder;\par inspection: no muscle asymmetry, no atrophy.\par Palpation: no tenderness noted, ACJ non-tender.\par ROM: full active ROM, full passive ROM.\par Strength testing): anterior deltoid, supraspinatus, infraspinatus, subscapularis all 5/5.\par Stability test: ant. apprehension negative, post. apprehension negative, relocation test negative.\par Impingement Test: negative NEER.\par Right shoulder;\par Inspection: no muscle asymmetry, no atrophy.\par Palpation: no tenderness noted, ACJ non-tender.\par ROM: full active ROM, full passive ROM.\par Strength testing): anterior deltoid, supraspinatus, infraspinatus, subscapularis all 5/5.\par Stability test: ant. apprehension negative, post. apprehension negative, relocation test negative.\par Impingement Test: negative NEER.\par Surgical Wounds: none.\par Left elbow;\par Inspection: negative swelling.\par Wounds: none.\par Palpation: non-tender.\par ROM: full ROM.\par Strength: 5/5 all groups.\par Stability: no instability.\par Mass: none.\par Right elbow;\par Inspection: negative swelling.\par Wounds: none.\par Palpation: non-tender.\par ROM: full ROM.\par Strength: 5/5 all groups.\par Stability: no instability.\par Mass: none.\par Left wrist;\par Inspection: negative swelling.\par Wound: none.\par Palpation (bone): no tenderness.\par ROM: full ROM.\par Strength: full , good.\par Right wrist;\par Inspection: negative swelling.\par Wound: none.\par Palpation (bone): no tenderness.\par ROM: full ROM.\par Strength: full , good.\par Left hand;\par Inspection: no skin changes, normal appearance.\par Wounds: none.\par Strength: full , able to make full fist.\par Sensation: light touch intact all fingers and thumb.\par Vascular: good capillary refill < 3 seconds, all fingers and thumb.\par Mass: none.\par Right hand;\par Inspection: no skin changes, normal appearance. \par Wounds: none.\par Palpation: non-tender throughout.\par Strength: full , able to make full fist.\par Sensation: light touch intact all fingers and thumb.\par Vascular: good capillary refill < 3 seconds, all fingers and thumb.\par Mass: none. \par \par \par \par \par

## 2020-07-13 ENCOUNTER — APPOINTMENT (OUTPATIENT)
Dept: ORTHOPEDIC SURGERY | Facility: CLINIC | Age: 80
End: 2020-07-13
Payer: MEDICARE

## 2020-07-13 VITALS
HEIGHT: 61 IN | BODY MASS INDEX: 30.02 KG/M2 | WEIGHT: 159 LBS | DIASTOLIC BLOOD PRESSURE: 36 MMHG | HEART RATE: 71 BPM | OXYGEN SATURATION: 97 % | SYSTOLIC BLOOD PRESSURE: 81 MMHG

## 2020-07-13 PROCEDURE — 99212 OFFICE O/P EST SF 10 MIN: CPT

## 2020-07-13 NOTE — HISTORY OF PRESENT ILLNESS
[de-identified] : Patient reports decreased preoperative right knee pain. Does experience chronic intermittent pain medial right knee particularly when driving. He is able to negotiate stairs. Denies right knee swelling locking or giving out. Has experienced chronic low back pain. Patient scheduled to be fitted with compression stockings for left leg venous insufficiency

## 2020-07-13 NOTE — DISCUSSION/SUMMARY
[de-identified] : The patient reports decrease in preoperative right knee pain she is experiencing chronic medial knee pain that prevents her from driving a car. There are clinical signs of ligamentous insufficiency but they are mild and local symptoms may be on the basis of mild flexion stability but could also be related to lumbar spine and referred pain. Overall continue with conservative treatment including strengthening, application of ice p.r.n. followup one month. If local findings persist may benefit from local steroid injection pes anserine

## 2020-07-13 NOTE — PHYSICAL EXAM
[de-identified] : Constitutional:Well nourished , well developed and in no acute distress\par Psychiatric: Alert and oriented to time place and person.Appropriate affect\par Respiratory: Unlabored respirations,no audible wheezing\par Cardiovascular: no leg swelling  ankle edema\par Vascular: no calf or thigh tenderness, \par Peripheral pulses;posterior tibial[right/left,]dorsal pedal[right/left]\par Skin:Head, neck, arms and lower extremities:no lesions or discoloration\par Lymphatics:No groin adenopathy\par Neurological: intact light touch sensation and grossly intact coordination and motor power.\par Knee;Right: \par Incision healed no effusion flexion 0/110 mild varus laxity mid range mildly positive flexion distraction moderate tenderness pes anserine [de-identified] : Prior x-rays right knee previews satisfactory component alignment

## 2020-08-17 ENCOUNTER — APPOINTMENT (OUTPATIENT)
Dept: ORTHOPEDIC SURGERY | Facility: CLINIC | Age: 80
End: 2020-08-17
Payer: MEDICARE

## 2020-08-17 VITALS — WEIGHT: 142 LBS | BODY MASS INDEX: 27.88 KG/M2 | HEIGHT: 60 IN

## 2020-08-17 PROCEDURE — 99212 OFFICE O/P EST SF 10 MIN: CPT

## 2020-08-17 NOTE — PHYSICAL EXAM

## 2020-08-17 NOTE — DISCUSSION/SUMMARY
[de-identified] : Impression; Right total knee replacement\par Plan; home exercise program followup in 6 months

## 2020-08-17 NOTE — HISTORY OF PRESENT ILLNESS
[de-identified] : Patient denies right knee pain when walking. She denies swelling locking giving out is able to go she stairs but does report some difficulty going down stairs. She is walking independently.

## 2020-11-18 ENCOUNTER — APPOINTMENT (OUTPATIENT)
Dept: ORTHOPEDIC SURGERY | Facility: CLINIC | Age: 80
End: 2020-11-18
Payer: MEDICARE

## 2020-11-18 PROCEDURE — 99212 OFFICE O/P EST SF 10 MIN: CPT

## 2020-11-18 NOTE — PHYSICAL EXAM

## 2020-11-18 NOTE — HISTORY OF PRESENT ILLNESS
[de-identified] : Patient reports right knee is pain free when walking but does experience fatigability Unrelated to the right knee. She does complain of right anterior knee pain when kneeling on the right knee and reports stiffness. When going up stairs she experiences mild difficulty

## 2021-02-10 ENCOUNTER — APPOINTMENT (OUTPATIENT)
Dept: ORTHOPEDIC SURGERY | Facility: CLINIC | Age: 81
End: 2021-02-10
Payer: MEDICARE

## 2021-02-10 PROCEDURE — 73502 X-RAY EXAM HIP UNI 2-3 VIEWS: CPT | Mod: RT

## 2021-02-10 PROCEDURE — 99213 OFFICE O/P EST LOW 20 MIN: CPT

## 2021-02-10 NOTE — HISTORY OF PRESENT ILLNESS
[de-identified] : followup right total knee replacement February 2020.. She does complain of right anterior knee pain when kneeling on the right knee and reports stiffness. When going up stairs she experiences mild difficulty.Going down stairs she reports she does not"feel secure" has to hold onto the banister . Denies swelling locking or giving outReports history of chronic low back pain for which she has undergone epidural injections in the past If stands more than one hour such as when cooking experiences low back pain Does experience recent onset of right lateral pelvic pain

## 2021-02-10 NOTE — DISCUSSION/SUMMARY
[de-identified] : Right total knee replacement\par possible mild flexion and stability versus referred pain from lumbar spine versus mild patellar clunk syndrome\par Plan physical therapy Referral After has completed Corona virus vaccines

## 2021-02-10 NOTE — PHYSICAL EXAM
[de-identified] : Constitutional:Well nourished , well developed and in no acute distress\par Psychiatric: Alert and oriented to time place and person.Appropriate affect\par Respiratory: Unlabored respirations,no audible wheezing\par Cardiovascular: no leg swelling  ankle edema\par Vascular: no calf or thigh tenderness, \par Peripheral pulses intact\par Skin:Head, neck, arms and lower extremities:no lesions or discoloration\par Lymphatics:No groin adenopathy\par Neurological: intact light touch sensation and grossly intact coordination and motor power.\par Knee;Right: Incision healed neutral no effusion flexion 0/110 mild laxity varus valgus stress midrange. Mildly positive flexion distraction at 90°\par Right hip;satisfactory passive range of motion pain-free\par  [de-identified] : x-ray AP pelvis right hip demonstrates joint space is well-preserved right and left

## 2021-07-19 ENCOUNTER — APPOINTMENT (OUTPATIENT)
Dept: ORTHOPEDIC SURGERY | Facility: CLINIC | Age: 81
End: 2021-07-19
Payer: MEDICARE

## 2021-07-19 VITALS — HEIGHT: 60 IN | WEIGHT: 153 LBS | BODY MASS INDEX: 30.04 KG/M2

## 2021-07-19 PROCEDURE — 99213 OFFICE O/P EST LOW 20 MIN: CPT

## 2021-07-19 NOTE — HISTORY OF PRESENT ILLNESS
[de-identified] : Right total knee replacement February 2020.. She does complain of right anterior knee pain when kneeling on the right knee and reports stiffness. When going up stairs she experiences mild difficulty.Going down stairs she reports she does not"feel secure" has to hold onto the banister . Denies swelling locking or giving outReports history of chronic low back pain for which she has undergone epidural injections in the past If stands more than one hour such as when cooking experiences low back pain Does experience recent onset of right lateral pelvic pain

## 2021-07-19 NOTE — CONSULT LETTER
[Dear  ___] : Dear  [unfilled], [Consult Letter:] : I had the pleasure of evaluating your patient, [unfilled]. [Consult Closing:] : Thank you very much for allowing me to participate in the care of this patient.  If you have any questions, please do not hesitate to contact me. [Sincerely,] : Sincerely, [FreeTextEntry2] : MIGNON DALTON [FreeTextEntry3] : Denzel Figueredo MD, FAAOS\par Total Hip and Total Knee Replacement \par Anterior Total Hip Replacement\par [facility]\par [address]\par [place], [place] \par [phone]\par fax [phone]\par

## 2021-07-19 NOTE — DISCUSSION/SUMMARY
[de-identified] : Right total knee replacement\par possible mild flexion and stability versus referred pain from lumbar spine versus mild patellar clunk syndrome\par Plan physical therapy knee sleeve follow-up 2 months

## 2021-07-19 NOTE — PHYSICAL EXAM
[de-identified] : Constitutional:Well nourished , well developed and in no acute distress\par Psychiatric: Alert and oriented to time place and person.Appropriate affect\par Respiratory: Unlabored respirations,no audible wheezing\par Cardiovascular: no leg swelling  ankle edema\par Vascular: no calf or thigh tenderness, \par Peripheral pulses intact\par Skin:Head, neck, arms and lower extremities:no lesions or discoloration\par Lymphatics:No groin adenopathy\par Neurological: intact light touch sensation and grossly intact coordination and motor power.\par Knee;Right: Incision healed neutral no effusion flexion 0/110 mild laxity varus valgus stress midrange. Mildly positive flexion distraction at 90°\par Right hip;satisfactory passive range of motion pain-free\par

## 2021-09-20 ENCOUNTER — APPOINTMENT (OUTPATIENT)
Dept: ORTHOPEDIC SURGERY | Facility: CLINIC | Age: 81
End: 2021-09-20

## 2021-12-28 ENCOUNTER — APPOINTMENT (OUTPATIENT)
Dept: ORTHOPEDIC SURGERY | Facility: CLINIC | Age: 81
End: 2021-12-28
Payer: MEDICARE

## 2021-12-28 VITALS — HEIGHT: 61 IN | WEIGHT: 153 LBS | BODY MASS INDEX: 28.89 KG/M2

## 2021-12-28 DIAGNOSIS — M17.11 UNILATERAL PRIMARY OSTEOARTHRITIS, RIGHT KNEE: ICD-10-CM

## 2021-12-28 PROCEDURE — 99214 OFFICE O/P EST MOD 30 MIN: CPT | Mod: 25

## 2021-12-28 PROCEDURE — 20610 DRAIN/INJ JOINT/BURSA W/O US: CPT | Mod: LT

## 2021-12-28 RX ORDER — AMOXICILLIN 500 MG/1
500 CAPSULE ORAL
Qty: 20 | Refills: 0 | Status: ACTIVE | COMMUNITY
Start: 2021-07-02

## 2021-12-28 RX ORDER — ROSUVASTATIN CALCIUM 40 MG/1
40 TABLET, FILM COATED ORAL
Qty: 90 | Refills: 0 | Status: ACTIVE | COMMUNITY
Start: 2021-03-22

## 2021-12-28 NOTE — PROCEDURE
[de-identified] : Injection: Left knee joint.\par Indication: Osteoarthritis.\par \par A discussion was had with the patient regarding this procedure and all questions were answered. All risks, benefits and alternatives were discussed. These included but were not limited to bleeding, infection, allergic reaction and reaccumulation of fluid. A timeout was done to ensure correct side and patient agreed to the procedure.  A [place] amanda was created on the skin utilizing a plastic needle cap to amanda the anticipated point of entry.   Alcohol was used to clean the skin, and Betadine was used to sterilize and prep the area in the lateral joint line aspect of the knee. Ethyl chloride spray was then used as a topical anesthetic. A 22-gauge needle was used to inject 2cc of 0.25% bupivacaine without epinephrine and 1cc of 40mg/ml methylprednisolone into the knee. A sterile bandage was then applied. The patient tolerated the procedure well.\par \par \par \par

## 2021-12-28 NOTE — PHYSICAL EXAM
[de-identified] : Constitutional: Well-nourished, well-developed, No acute distress\par Respiratory:  Good respiratory effort, no SOB\par Lymphatic: No regional lymphadenopathy, no lymphedema\par Psychiatric: Pleasant and normal affect, alert and oriented x3\par Musculoskeletal: normal except where as noted in regional exam\par \par Left knee:\par APPEARANCE: + enlargement of the distal femur and proximal tibia, varus deformity, no swelling\par POSITIVE TENDERNESS:  Distal femur, proximal tibia, medial jt line/retinaculum, and lateral jt line/retinaculum\par NONTENDER: patellar & quadriceps tendons, MCL/LCL, ITB at the lateral femoral condyle & Gerdy's tubercle, pes bursa. \par ROM: full extension, limited flexion to 125° due to stiffness and pain. \par RESISTIVE TESTING: painless resisted knee flex/ext, although + crepitus felt in anterior knee. \par SPECIAL TESTS: stable v/v stress. painless grind. neg ant/post drawer. + Valeria's for pain in medial and lateral joint line. \par  [de-identified] : I reviewed, interpreted and clinically correlated the following outside imaging studies,\par MRI of the left knee, evidence of severe tricompartmental osteoarthritis with bone-on-bone contact of the medial compartment, and edema within the medial femoral condyle and medial tibial plateau consistent with chronic subchondral insufficiency fracture\par \par Date of Exam: 12-\par  \par EXAM:  MRI LEFT KNEE WITHOUT CONTRAST\par \par HISTORY:  Severe knee pain. History of arthritis\par \par TECHNIQUE:  Multiplanar, multi-sequence MRI of the left knee was obtained on a 3T scanner according to standard protocol without intravenous or intra-articular contrast. \par \par COMPARISON:  Left knee radiographs 9/20/2021\par \par FINDINGS:  \par \par Menisci: \par Medial: Horizontal undersurface tear extending from the body into the posterior horn. Mild peripheral extrusion of the meniscal fragments into the inferior coronary recess.\par Lateral: Horizontal tear diffusely involving the entire meniscus.\par \par Cruciate ligaments:\par Moderate mucoid degeneration of anterior cruciate ligament (ACL).\par Intact posterior cruciate ligament (PCL).\par \par Collateral ligaments:\par Intrasubstance intermediate signal in the medial collateral ligament (MCL), likely sequela of remote sprain.\par Intact lateral collateral ligamentous complex (biceps femoris tendon, fibular collateral ligament, and iliotibial band).\par No posteromedial or posterolateral corner injury.\par \par Extensor mechanism: \par Thickening with intrasubstance intermediate signal in the distal quadriceps tendon with adjacent suprapatellar fat pad edema.\par Mild patellar tendinosis.\par Medial and lateral patellar retinacula are intact.\par \par Articular cartilage: \par Medial: Mild diffuse cartilage thinning without high-grade chondral defect. Profound medial femoral condyle subarticular edema with focal subchondral curvilinear low intensity focus measuring 1.1 cm in AP dimension (series 10: Image 9), likely a subchondral insufficiency fracture. There is mild subchondral edema at the medial tibial plateau, likely bone stress injury. \par Lateral: Intact\par Patellofemoral: Intact\par \par Osseous structures: \par No fracture or osteonecrosis.Tiny retropatellar and medial compartment marginal osteophytes.\par \par Knee joint:\par Small effusion with synovitis. \par Miniscule Webb's cyst.\par Mild diffuse knee subcutaneous edema, more focally along the prepatellar soft tissues.\par \par Muscles and nerves: \par Mild fatty infiltration of the knee musculature.\par Visualized sciatic, tibial, and common peroneal nerves are unremarkable.\par \par IMPRESSION:  MRI of the left knee demonstrates: \par \par 1.  Subchondral insufficiency fracture along the medial femoral condyle with profound marrow edema. Subchondral marrow edema in the medial tibial plateau likely reflects stress response.\par 2.  Horizontal undersurface tear involving the body and posterior horn of the medial meniscus. Extension of meniscal fragments into the inferior coronary recess.\par 3.  Horizontal tear involving the entire lateral meniscus.\par 4.  Mild quadriceps tendinosis with adjacent suprapatellar fat pad edema. Clinical correlation is suggested to exclude underlying impingement.\par 5.  Mild patellar tendinosis.\par 6.  Moderate mucoid degeneration of ACL. Sequela of remote MCL sprain.\par 7.  Small effusion with synovitis and a miniscule Baker's cyst.

## 2021-12-28 NOTE — HISTORY OF PRESENT ILLNESS
[de-identified] : Patient is here for left knee pain for the past 6 months. No specific injury or trauma that she recalls. Reports that her L knee pain is worse with walking, going up and down stairs. Taking oxycodone as needed for L knee pain. Has never had injection to L knee. Has not had any PT for L knee pain. No numbness, tingling or weakness. \par S/p right knee replacement 2 years ago with Dr. Figueredo\par \par The patient's past medical history, past surgical history, medications and allergies were reviewed by me today and documented accordingly. In addition, the patient's family and social history, which were noncontributory to this visit, were reviewed also. Intake form was reviewed. The patient has no family history of arthritis.

## 2021-12-28 NOTE — CONSULT LETTER
[Dear  ___] : Dear  [unfilled], [Consult Letter:] : I had the pleasure of evaluating your patient, [unfilled]. [Please see my note below.] : Please see my note below. [Sincerely,] : Sincerely, [FreeTextEntry2] : Dr. Shahab Ziegler\par [facility]\par [address] [facility] [address] [id number] [FreeTextEntry3] : Chance Tracy DO, ATC\par Primary Care Sports Medicine\par [facility]

## 2021-12-28 NOTE — DISCUSSION/SUMMARY
[de-identified] : Discussed findings of today's exam and possible causes of patient's pain.  Educated patient on their most probable diagnosis of chronic left knee pain with recent atraumatic exacerbation due to underlying severe osteoarthritis.  Reviewed possible courses of treatment, and we collaboratively decided best course of treatment at this time will include conservative management.  I reviewed the patient's MRI results with her, educated the patient that the subchondral insufficiency fracture is likely a chronic finding consistent with severe bone-on-bone osteoarthritis, she has no recent injury or trauma that would have caused this injury.  I advised the patient that the primary etiology of her pain at this time is atraumatic exacerbation of underlying severe osteoarthritis.  Patient has history of right total knee replacement, she is aware that she is a candidate for left knee replacement, but she is not ready to undergo this level of surgical intervention.  We discussed various treatment options as well as associated risk/benefits/alternatives and patient elected to proceed with cortisone injection today (see procedure note).  Informed the patient that the numbing medicine in today's injection will last for about 4-6 hours. The steroid that was injected will start to work in 1 to 2 days, peak at 1-2 weeks, and may last up to 1-2 months.  Patient will be started on a course of home physical therapy to restore normal range of motion and strength as tolerated.  Patient started on a course of oral NSAIDs, prescription given for Naprosyn (We discussed all possible side effects of this medication).  If patient has persisting pain may consider hyaluronic acid injection as a future more long-term preventative option.  Follow up as needed.  Patient appreciates and agrees with current plan.\par \par I work as part of an academic orthopedic group and routinely have a physician in training (resident / fellow) working with me.  Any part of the history and physical exam performed by the physician in training was either directly reviewed and/or replicated by myself.  Any procedure performed by the physician in training was performed under my direct supervision and with the consent of the patient.\par \par This note was generated using dragon medical dictation software.  A reasonable effort has been made for proofreading its contents, but typos may still remain.  If there are any questions or points of clarification needed please notify my office.\par

## 2022-10-26 ENCOUNTER — APPOINTMENT (OUTPATIENT)
Dept: ORTHOPEDIC SURGERY | Facility: CLINIC | Age: 82
End: 2022-10-26

## 2022-10-26 PROCEDURE — 99213 OFFICE O/P EST LOW 20 MIN: CPT

## 2022-10-26 PROCEDURE — 73562 X-RAY EXAM OF KNEE 3: CPT | Mod: 50

## 2022-10-26 NOTE — DISCUSSION/SUMMARY
[de-identified] : Impression right total knee replacement doing well, left knee osteoarthritis\par Plan follow-up Dr. ALEX JADE for repeat steroid injection

## 2022-10-26 NOTE — HISTORY OF PRESENT ILLNESS
[de-identified] : Catherine is an 82 year old female who presents for follow up of right total knee replacement February 2020.[employer] total knee was recalled She does complain of right anterior knee pain when kneeling on the right knee and reports stiffness. When going up stairs she experiences mild difficulty.Going down stairs she reports she does not"feel secure" has to hold onto the banister . Denies swelling locking or giving outReports history of chronic low back pain for which she has undergone epidural injections in the past If stands more than one hour such as when cooking experiences low back pain Does experience recent onset of right lateral pelvic painHas been experiencing chronic intermittent occasional pain anterior aspect left knee radiating down anterior left leg provoked by going downstairs.  In the past has undergone steroid injection left knee with sustained relief until recent knee pain

## 2022-10-26 NOTE — PHYSICAL EXAM
[de-identified] : Constitutional:Well nourished , well developed and in no acute distress\par Psychiatric: Alert and oriented to time place and person.Appropriate affect\par Respiratory: Unlabored respirations,no audible wheezing\par Cardiovascular: no leg swelling  ankle edema\par Vascular: no calf or thigh tenderness, \par Peripheral pulses intact\par Skin:Head, neck, arms and lower extremities:no lesions or discoloration\par Lymphatics:No groin adenopathy\par Neurological: intact light touch sensation and grossly intact coordination and motor power.\par Knee;Right: Incision healed neutral no effusion flexion0/115 ligaments intact\par Left knee no effusion flexion 0/125 provocation of end flexion pain crepitus [de-identified] : X-rays right and left knee multiple views reveals right total knee satisfactory component alignment.  Left knee minimal degenerative changes

## 2023-01-23 ENCOUNTER — APPOINTMENT (OUTPATIENT)
Dept: ORTHOPEDIC SURGERY | Facility: CLINIC | Age: 83
End: 2023-01-23
Payer: MEDICARE

## 2023-01-23 DIAGNOSIS — M84.469D PATHOLOGICAL FRACTURE, UNSPECIFIED TIBIA AND FIBULA, SUBSEQUENT ENCOUNTER FOR FRACTURE WITH ROUTINE HEALING: ICD-10-CM

## 2023-01-23 PROCEDURE — 73562 X-RAY EXAM OF KNEE 3: CPT | Mod: LT

## 2023-01-23 PROCEDURE — 99214 OFFICE O/P EST MOD 30 MIN: CPT

## 2023-01-23 RX ORDER — CELECOXIB 200 MG/1
200 CAPSULE ORAL DAILY
Qty: 30 | Refills: 0 | Status: ACTIVE | COMMUNITY
Start: 2023-01-23 | End: 1900-01-01

## 2023-01-23 NOTE — ADDENDUM
[FreeTextEntry1] : I, Sondra Augustin wrote this note acting as a scribe for Dr. Arun Ryan on Jan 23, 2023.

## 2023-01-23 NOTE — END OF VISIT
[FreeTextEntry3] : All medical record entries made by the Scribe were at my,  Dr. Arun Ryan MD., direction and personally dictated by me on 01/23/2023. I have personally reviewed the chart and agree that the record accurately reflects my personal performance of the history, physical exam, assessment and plan.

## 2023-01-23 NOTE — HISTORY OF PRESENT ILLNESS
[de-identified] : 82 year old female who presents today for evaluation of left knee pain for about 1 month. She denies any recent trauma or injury. The patient reports pain is mostly along the posterior aspect of the knee. It is worse when getting up from a seated position and ascending / descending stairs. She reports taking OTC medications for pain relief. She denies any left extremity numbness and tingling. No buckling of the left knee. She notes that she was given an injection at the left knee about 6 months ago, she is unsure if it was gel or cortisone. She has a can at home that she uses intermittently to aid with gait. She is s/p right TKA on 2/27/2020 with Dr. Figueredo. Was last seen by him on 10/26 with repeat imaging, though at that time she reports no pain of the left knee. She has an MRI of the left knee from 1/11/2023 from [facility].

## 2023-01-23 NOTE — PHYSICAL EXAM
[de-identified] : Patient is WDWN, alert, and in no acute distress. Breathing is unlabored. She is grossly oriented to person, place, and time.\par \par Left Knee:\par She presents to the office FWB.\par Pain noted posteriorly to the knee.\par Mild joint line tenderness.\par ROM to the left knee not accessed. [de-identified] : AP, lateral and 2-oblique views of the BILATERAL knees were obtained today and revealed a well positioned right total knee prosthesis. There is arthritis noted in the medial compartment of the left knee.There is chondrocalcinosis in the lateral compartment.\par \par ------------------------------------------------------------------------------------------------------------------------------------------------------------------------------------ \par \par MRI of the left knee obtained on 1/11/2023 at [facility] and independently reviewed in the office today, 01/23/2023, demonstrated \par IMPRESSION:  MRI of the left knee demonstrates: \par 1.  New medial tibial plateau insufficiency fracture. Improved edema of prior medial femoral condyle insufficiency fracture.\par 2.  No change in medial and lateral meniscal tears.\par 3.  No change in ACL appearance consistent with remote injury.\par 4.  No change in distal quadriceps tendinosis without tear.\par 5.  Ruptured popliteal cyst.\par

## 2023-01-23 NOTE — DISCUSSION/SUMMARY
[de-identified] : The underlying pathophysiology was reviewed with the patient. XR films were reviewed with the patient. Discussed at length the nature of the patient’s condition. The left knee symptoms appear secondary to medial tibial plateau insufficiency fracture and medial compartment arthritis. \par \par At this time, I discussed with her that the fracture will take 6 weeks to heal. I would advise use of a cane or walker to unload weight from the left lower extremity. I told her however that I would not recommend an injection at this point in time however given the presence of the fracture. I did discuss with her that while the pain from the fracture will improve, the arthritis will remain which may continue to be symptomatic. If her pain persists due to arthritis, I would then advise a cortisone or gel injection. I also recommended further follow up with Dr. Figueredo for possible surgical consult of a TKR if needed. \par \par RX: Celebrex 200mg, once daily (30 tablets).\par \par All questions answered, understanding verbalized. Patient in agreement with plan of care. Follow up in 6 weeks for repeat xrays, if needed. She may either see me or Dr. Figueredo in follow up.

## 2023-01-24 ENCOUNTER — APPOINTMENT (OUTPATIENT)
Dept: SURGERY | Facility: CLINIC | Age: 83
End: 2023-01-24
Payer: MEDICARE

## 2023-01-24 VITALS
BODY MASS INDEX: 28.89 KG/M2 | WEIGHT: 153 LBS | HEART RATE: 92 BPM | SYSTOLIC BLOOD PRESSURE: 133 MMHG | DIASTOLIC BLOOD PRESSURE: 80 MMHG | OXYGEN SATURATION: 94 % | HEIGHT: 61 IN

## 2023-01-24 VITALS — TEMPERATURE: 97.1 F

## 2023-01-24 DIAGNOSIS — R10.31 RIGHT LOWER QUADRANT PAIN: ICD-10-CM

## 2023-01-24 DIAGNOSIS — Z80.0 FAMILY HISTORY OF MALIGNANT NEOPLASM OF DIGESTIVE ORGANS: ICD-10-CM

## 2023-01-24 DIAGNOSIS — Z86.39 PERSONAL HISTORY OF OTHER ENDOCRINE, NUTRITIONAL AND METABOLIC DISEASE: ICD-10-CM

## 2023-01-24 DIAGNOSIS — Z78.9 OTHER SPECIFIED HEALTH STATUS: ICD-10-CM

## 2023-01-24 DIAGNOSIS — K59.00 CONSTIPATION, UNSPECIFIED: ICD-10-CM

## 2023-01-24 PROCEDURE — 99202 OFFICE O/P NEW SF 15 MIN: CPT

## 2023-01-24 NOTE — PHYSICAL EXAM
[JVD] : no jugular venous distention  [Normal Thyroid] : the thyroid was normal [Carotid Bruits] : no carotid bruits [Normal Breath Sounds] : Normal breath sounds [Normal Heart Sounds] : normal heart sounds [Normal Rate and Rhythm] : normal rate and rhythm [No Rash or Lesion] : No rash or lesion [Alert] : alert [Oriented to Person] : oriented to person [Oriented to Place] : oriented to place [Oriented to Time] : oriented to time [Calm] : calm [de-identified] : well developed female in no distress [de-identified] : noninjected and nonicteric [de-identified] : without adenopathy [de-identified] : normal bowel sounds, without distension or tenderness [de-identified] : pt is going for a colonoscopy [de-identified] : with right groin tenderness, without palpable hernia [de-identified] : without calf pain or swelling

## 2023-01-24 NOTE — ASSESSMENT
[FreeTextEntry1] : I have discussed with pt and  that because of her change in bowel habits I would strongly suggest she have a colonoscopy now. Pt to return to office after.

## 2023-01-24 NOTE — REVIEW OF SYSTEMS
[Abdominal Pain] : abdominal pain [Vomiting] : no vomiting [Constipation] : constipation [Diarrhea] : no diarrhea [Heartburn] : no heartburn [Melena] : no melena [Arthralgias] : arthralgias [Joint Swelling] : joint swelling [Joint Stiffness] : joint stiffness [Limb Pain] : limb pain [Limb Swelling] : no limb swelling [Suicidal] : not suicidal [Sleep Disturbances] : no sleep disturbances [Anxiety] : anxiety [Depression] : depression [Change In Personality] : no personality change [Emotional Problems] : no emotional problems [Negative] : Heme/Lymph [FreeTextEntry8] : increased urination [FreeTextEntry9] : with left knee pain

## 2023-02-01 ENCOUNTER — APPOINTMENT (OUTPATIENT)
Dept: ORTHOPEDIC SURGERY | Facility: CLINIC | Age: 83
End: 2023-02-01
Payer: MEDICARE

## 2023-02-01 PROCEDURE — 99213 OFFICE O/P EST LOW 20 MIN: CPT

## 2023-03-02 ENCOUNTER — RX RENEWAL (OUTPATIENT)
Age: 83
End: 2023-03-02

## 2023-03-30 ENCOUNTER — RX RENEWAL (OUTPATIENT)
Age: 83
End: 2023-03-30

## 2023-04-14 ENCOUNTER — OUTPATIENT (OUTPATIENT)
Dept: OUTPATIENT SERVICES | Facility: HOSPITAL | Age: 83
LOS: 1 days | End: 2023-04-14
Payer: MEDICARE

## 2023-04-14 VITALS
WEIGHT: 153 LBS | HEIGHT: 59 IN | SYSTOLIC BLOOD PRESSURE: 102 MMHG | RESPIRATION RATE: 14 BRPM | TEMPERATURE: 98 F | DIASTOLIC BLOOD PRESSURE: 65 MMHG | HEART RATE: 70 BPM | OXYGEN SATURATION: 98 %

## 2023-04-14 DIAGNOSIS — Z98.890 OTHER SPECIFIED POSTPROCEDURAL STATES: Chronic | ICD-10-CM

## 2023-04-14 DIAGNOSIS — M17.12 UNILATERAL PRIMARY OSTEOARTHRITIS, LEFT KNEE: ICD-10-CM

## 2023-04-14 DIAGNOSIS — Z01.818 ENCOUNTER FOR OTHER PREPROCEDURAL EXAMINATION: ICD-10-CM

## 2023-04-14 DIAGNOSIS — Z98.49 CATARACT EXTRACTION STATUS, UNSPECIFIED EYE: Chronic | ICD-10-CM

## 2023-04-14 DIAGNOSIS — Z96.651 PRESENCE OF RIGHT ARTIFICIAL KNEE JOINT: Chronic | ICD-10-CM

## 2023-04-14 DIAGNOSIS — Z90.89 ACQUIRED ABSENCE OF OTHER ORGANS: Chronic | ICD-10-CM

## 2023-04-14 LAB
A1C WITH ESTIMATED AVERAGE GLUCOSE RESULT: 6 % — HIGH (ref 4–5.6)
ALBUMIN SERPL ELPH-MCNC: 3.5 G/DL — SIGNIFICANT CHANGE UP (ref 3.3–5)
ALP SERPL-CCNC: 97 U/L — SIGNIFICANT CHANGE UP (ref 30–120)
ALT FLD-CCNC: 26 U/L DA — SIGNIFICANT CHANGE UP (ref 10–60)
ANION GAP SERPL CALC-SCNC: 9 MMOL/L — SIGNIFICANT CHANGE UP (ref 5–17)
APTT BLD: 26.3 SEC — LOW (ref 27.5–35.5)
AST SERPL-CCNC: 31 U/L — SIGNIFICANT CHANGE UP (ref 10–40)
BILIRUB SERPL-MCNC: 0.2 MG/DL — SIGNIFICANT CHANGE UP (ref 0.2–1.2)
BLD GP AB SCN SERPL QL: SIGNIFICANT CHANGE UP
BUN SERPL-MCNC: 21 MG/DL — SIGNIFICANT CHANGE UP (ref 7–23)
CALCIUM SERPL-MCNC: 9.3 MG/DL — SIGNIFICANT CHANGE UP (ref 8.4–10.5)
CHLORIDE SERPL-SCNC: 108 MMOL/L — SIGNIFICANT CHANGE UP (ref 96–108)
CO2 SERPL-SCNC: 27 MMOL/L — SIGNIFICANT CHANGE UP (ref 22–31)
CREAT SERPL-MCNC: 0.56 MG/DL — SIGNIFICANT CHANGE UP (ref 0.5–1.3)
EGFR: 91 ML/MIN/1.73M2 — SIGNIFICANT CHANGE UP
ESTIMATED AVERAGE GLUCOSE: 126 MG/DL — HIGH (ref 68–114)
GLUCOSE SERPL-MCNC: 96 MG/DL — SIGNIFICANT CHANGE UP (ref 70–99)
HCT VFR BLD CALC: 35.8 % — SIGNIFICANT CHANGE UP (ref 34.5–45)
HGB BLD-MCNC: 12 G/DL — SIGNIFICANT CHANGE UP (ref 11.5–15.5)
INR BLD: 0.96 RATIO — SIGNIFICANT CHANGE UP (ref 0.88–1.16)
MCHC RBC-ENTMCNC: 30.8 PG — SIGNIFICANT CHANGE UP (ref 27–34)
MCHC RBC-ENTMCNC: 33.5 GM/DL — SIGNIFICANT CHANGE UP (ref 32–36)
MCV RBC AUTO: 92 FL — SIGNIFICANT CHANGE UP (ref 80–100)
MRSA PCR RESULT.: SIGNIFICANT CHANGE UP
NRBC # BLD: 0 /100 WBCS — SIGNIFICANT CHANGE UP (ref 0–0)
PLATELET # BLD AUTO: 240 K/UL — SIGNIFICANT CHANGE UP (ref 150–400)
POTASSIUM SERPL-MCNC: 4.3 MMOL/L — SIGNIFICANT CHANGE UP (ref 3.5–5.3)
POTASSIUM SERPL-SCNC: 4.3 MMOL/L — SIGNIFICANT CHANGE UP (ref 3.5–5.3)
PROT SERPL-MCNC: 7.3 G/DL — SIGNIFICANT CHANGE UP (ref 6–8.3)
PROTHROM AB SERPL-ACNC: 11.3 SEC — SIGNIFICANT CHANGE UP (ref 10.5–13.4)
RBC # BLD: 3.89 M/UL — SIGNIFICANT CHANGE UP (ref 3.8–5.2)
RBC # FLD: 13.1 % — SIGNIFICANT CHANGE UP (ref 10.3–14.5)
S AUREUS DNA NOSE QL NAA+PROBE: SIGNIFICANT CHANGE UP
SODIUM SERPL-SCNC: 144 MMOL/L — SIGNIFICANT CHANGE UP (ref 135–145)
WBC # BLD: 5.13 K/UL — SIGNIFICANT CHANGE UP (ref 3.8–10.5)
WBC # FLD AUTO: 5.13 K/UL — SIGNIFICANT CHANGE UP (ref 3.8–10.5)

## 2023-04-14 PROCEDURE — 93005 ELECTROCARDIOGRAM TRACING: CPT

## 2023-04-14 PROCEDURE — G0463: CPT

## 2023-04-14 PROCEDURE — 93010 ELECTROCARDIOGRAM REPORT: CPT

## 2023-04-14 RX ORDER — ROSUVASTATIN CALCIUM 5 MG/1
1 TABLET ORAL
Refills: 0 | DISCHARGE

## 2023-04-14 RX ORDER — ESCITALOPRAM OXALATE 10 MG/1
30 TABLET, FILM COATED ORAL
Refills: 0 | DISCHARGE

## 2023-04-14 NOTE — H&P PST ADULT - PROBLEM SELECTOR PLAN 1
scheduled for left knee replacement on 5/4/23   Will obtain medical and vascular clearance   Pre op instructions   COVID testing 5/1/23 on [place] scheduled for left knee replacement on 5/4/23   Will obtain medical and vascular clearance   Pre op instructions instructed to take Lexapro and trileptal on DOS   COVID testing 5/1/23 on [place]

## 2023-04-14 NOTE — H&P PST ADULT - NSICDXPASTMEDICALHX_GEN_ALL_CORE_FT
PAST MEDICAL HISTORY:  Anxiety     Depression     GERD (gastroesophageal reflux disease)     History of vascular disease venous; has swelling    HLD (hyperlipidemia)     Hyperlipidemia     Osteoarthritis of left knee

## 2023-04-14 NOTE — H&P PST ADULT - NSICDXPASTSURGICALHX_GEN_ALL_CORE_FT
PAST SURGICAL HISTORY:  S/P cataract surgery bilateral    S/P foot surgery, right right great toe surgery    S/P tonsillectomy     S/P total knee replacement, right     S/P wrist surgery right

## 2023-04-14 NOTE — H&P PST ADULT - NS PRO LAST MENSTRUAL DATE
Implemented All Universal Safety Interventions:  [place] to call system. Call bell, personal items and telephone within reach. Instruct patient to call for assistance. Room bathroom lighting operational. Non-slip footwear when patient is off stretcher. Physically safe environment: no spills, clutter or unnecessary equipment. Stretcher in lowest position, wheels locked, appropriate side rails in place.
not applicable

## 2023-04-14 NOTE — H&P PST ADULT - HISTORY OF PRESENT ILLNESS
This  is a 81 y/o female who presents with left knee pain radiating down to left leg , Reports constant pain and worse pain when walking , climbing up and down stairs with pain scale 8/10 . Uses cane to ambulate . scheduled for left knee replacement on 5/4/23

## 2023-04-19 PROBLEM — M17.12 UNILATERAL PRIMARY OSTEOARTHRITIS, LEFT KNEE: Chronic | Status: ACTIVE | Noted: 2023-04-14

## 2023-04-19 PROBLEM — E78.5 HYPERLIPIDEMIA, UNSPECIFIED: Chronic | Status: ACTIVE | Noted: 2023-04-14

## 2023-04-20 ENCOUNTER — OUTPATIENT (OUTPATIENT)
Dept: OUTPATIENT SERVICES | Facility: HOSPITAL | Age: 83
LOS: 1 days | End: 2023-04-20
Payer: MEDICARE

## 2023-04-20 ENCOUNTER — APPOINTMENT (OUTPATIENT)
Dept: CT IMAGING | Facility: CLINIC | Age: 83
End: 2023-04-20
Payer: MEDICARE

## 2023-04-20 DIAGNOSIS — M17.12 UNILATERAL PRIMARY OSTEOARTHRITIS, LEFT KNEE: ICD-10-CM

## 2023-04-20 DIAGNOSIS — Z90.89 ACQUIRED ABSENCE OF OTHER ORGANS: Chronic | ICD-10-CM

## 2023-04-20 DIAGNOSIS — Z96.651 PRESENCE OF RIGHT ARTIFICIAL KNEE JOINT: Chronic | ICD-10-CM

## 2023-04-20 DIAGNOSIS — Z98.49 CATARACT EXTRACTION STATUS, UNSPECIFIED EYE: Chronic | ICD-10-CM

## 2023-04-20 DIAGNOSIS — Z98.890 OTHER SPECIFIED POSTPROCEDURAL STATES: Chronic | ICD-10-CM

## 2023-04-20 PROCEDURE — 73700 CT LOWER EXTREMITY W/O DYE: CPT

## 2023-04-20 PROCEDURE — 73700 CT LOWER EXTREMITY W/O DYE: CPT | Mod: 26,LT,MH

## 2023-04-26 ENCOUNTER — APPOINTMENT (OUTPATIENT)
Dept: ORTHOPEDIC SURGERY | Facility: CLINIC | Age: 83
End: 2023-04-26
Payer: MEDICARE

## 2023-04-26 ENCOUNTER — NON-APPOINTMENT (OUTPATIENT)
Age: 83
End: 2023-04-26

## 2023-04-26 DIAGNOSIS — M17.12 UNILATERAL PRIMARY OSTEOARTHRITIS, LEFT KNEE: ICD-10-CM

## 2023-04-26 DIAGNOSIS — Z96.651 PRESENCE OF RIGHT ARTIFICIAL KNEE JOINT: ICD-10-CM

## 2023-04-26 PROCEDURE — 99213 OFFICE O/P EST LOW 20 MIN: CPT

## 2023-05-01 ENCOUNTER — RX RENEWAL (OUTPATIENT)
Age: 83
End: 2023-05-01

## 2023-05-02 RX ORDER — POLYETHYLENE GLYCOL 3350 17 G/17G
17 POWDER, FOR SOLUTION ORAL AT BEDTIME
Refills: 0 | Status: DISCONTINUED | OUTPATIENT
Start: 2023-05-04 | End: 2023-05-05

## 2023-05-02 RX ORDER — PANTOPRAZOLE SODIUM 20 MG/1
40 TABLET, DELAYED RELEASE ORAL
Refills: 0 | Status: DISCONTINUED | OUTPATIENT
Start: 2023-05-04 | End: 2023-05-05

## 2023-05-02 RX ORDER — HYDROMORPHONE HYDROCHLORIDE 2 MG/ML
0.5 INJECTION INTRAMUSCULAR; INTRAVENOUS; SUBCUTANEOUS
Refills: 0 | Status: DISCONTINUED | OUTPATIENT
Start: 2023-05-04 | End: 2023-05-05

## 2023-05-02 RX ORDER — SENNA PLUS 8.6 MG/1
2 TABLET ORAL AT BEDTIME
Refills: 0 | Status: DISCONTINUED | OUTPATIENT
Start: 2023-05-04 | End: 2023-05-05

## 2023-05-02 RX ORDER — OXYCODONE HYDROCHLORIDE 5 MG/1
10 TABLET ORAL
Refills: 0 | Status: DISCONTINUED | OUTPATIENT
Start: 2023-05-04 | End: 2023-05-05

## 2023-05-02 RX ORDER — ONDANSETRON 8 MG/1
4 TABLET, FILM COATED ORAL EVERY 6 HOURS
Refills: 0 | Status: DISCONTINUED | OUTPATIENT
Start: 2023-05-04 | End: 2023-05-05

## 2023-05-02 RX ORDER — MAGNESIUM HYDROXIDE 400 MG/1
30 TABLET, CHEWABLE ORAL DAILY
Refills: 0 | Status: DISCONTINUED | OUTPATIENT
Start: 2023-05-04 | End: 2023-05-05

## 2023-05-02 RX ORDER — OXYCODONE HYDROCHLORIDE 5 MG/1
5 TABLET ORAL
Refills: 0 | Status: DISCONTINUED | OUTPATIENT
Start: 2023-05-04 | End: 2023-05-05

## 2023-05-02 RX ORDER — CELECOXIB 200 MG/1
200 CAPSULE ORAL EVERY 12 HOURS
Refills: 0 | Status: DISCONTINUED | OUTPATIENT
Start: 2023-05-04 | End: 2023-05-05

## 2023-05-02 RX ORDER — SODIUM CHLORIDE 9 MG/ML
1000 INJECTION, SOLUTION INTRAVENOUS
Refills: 0 | Status: DISCONTINUED | OUTPATIENT
Start: 2023-05-04 | End: 2023-05-05

## 2023-05-02 RX ORDER — ACETAMINOPHEN 500 MG
1000 TABLET ORAL EVERY 8 HOURS
Refills: 0 | Status: DISCONTINUED | OUTPATIENT
Start: 2023-05-05 | End: 2023-05-05

## 2023-05-03 ENCOUNTER — TRANSCRIPTION ENCOUNTER (OUTPATIENT)
Age: 83
End: 2023-05-03

## 2023-05-04 ENCOUNTER — TRANSCRIPTION ENCOUNTER (OUTPATIENT)
Age: 83
End: 2023-05-04

## 2023-05-04 ENCOUNTER — APPOINTMENT (OUTPATIENT)
Dept: ORTHOPEDIC SURGERY | Facility: HOSPITAL | Age: 83
End: 2023-05-04

## 2023-05-04 ENCOUNTER — INPATIENT (INPATIENT)
Facility: HOSPITAL | Age: 83
LOS: 0 days | Discharge: NURSING, REHAB OR LONG-TERM CARE | DRG: 470 | End: 2023-05-05
Attending: ORTHOPAEDIC SURGERY | Admitting: ORTHOPAEDIC SURGERY
Payer: COMMERCIAL

## 2023-05-04 VITALS
SYSTOLIC BLOOD PRESSURE: 132 MMHG | DIASTOLIC BLOOD PRESSURE: 66 MMHG | WEIGHT: 155.43 LBS | TEMPERATURE: 98 F | RESPIRATION RATE: 20 BRPM | HEIGHT: 61 IN | HEART RATE: 68 BPM | OXYGEN SATURATION: 98 %

## 2023-05-04 DIAGNOSIS — Z96.651 PRESENCE OF RIGHT ARTIFICIAL KNEE JOINT: Chronic | ICD-10-CM

## 2023-05-04 DIAGNOSIS — Z98.890 OTHER SPECIFIED POSTPROCEDURAL STATES: Chronic | ICD-10-CM

## 2023-05-04 DIAGNOSIS — Z90.89 ACQUIRED ABSENCE OF OTHER ORGANS: Chronic | ICD-10-CM

## 2023-05-04 DIAGNOSIS — M17.12 UNILATERAL PRIMARY OSTEOARTHRITIS, LEFT KNEE: ICD-10-CM

## 2023-05-04 DIAGNOSIS — Z98.49 CATARACT EXTRACTION STATUS, UNSPECIFIED EYE: Chronic | ICD-10-CM

## 2023-05-04 LAB
ANION GAP SERPL CALC-SCNC: 7 MMOL/L — SIGNIFICANT CHANGE UP (ref 5–17)
BUN SERPL-MCNC: 12 MG/DL — SIGNIFICANT CHANGE UP (ref 7–23)
CALCIUM SERPL-MCNC: 8.8 MG/DL — SIGNIFICANT CHANGE UP (ref 8.4–10.5)
CHLORIDE SERPL-SCNC: 104 MMOL/L — SIGNIFICANT CHANGE UP (ref 96–108)
CO2 SERPL-SCNC: 28 MMOL/L — SIGNIFICANT CHANGE UP (ref 22–31)
CREAT SERPL-MCNC: 0.7 MG/DL — SIGNIFICANT CHANGE UP (ref 0.5–1.3)
EGFR: 86 ML/MIN/1.73M2 — SIGNIFICANT CHANGE UP
GLUCOSE SERPL-MCNC: 196 MG/DL — HIGH (ref 70–99)
HCT VFR BLD CALC: 33.3 % — LOW (ref 34.5–45)
HGB BLD-MCNC: 10.9 G/DL — LOW (ref 11.5–15.5)
MCHC RBC-ENTMCNC: 30.3 PG — SIGNIFICANT CHANGE UP (ref 27–34)
MCHC RBC-ENTMCNC: 32.7 GM/DL — SIGNIFICANT CHANGE UP (ref 32–36)
MCV RBC AUTO: 92.5 FL — SIGNIFICANT CHANGE UP (ref 80–100)
NRBC # BLD: 0 /100 WBCS — SIGNIFICANT CHANGE UP (ref 0–0)
PLATELET # BLD AUTO: 205 K/UL — SIGNIFICANT CHANGE UP (ref 150–400)
POTASSIUM SERPL-MCNC: 4.4 MMOL/L — SIGNIFICANT CHANGE UP (ref 3.5–5.3)
POTASSIUM SERPL-SCNC: 4.4 MMOL/L — SIGNIFICANT CHANGE UP (ref 3.5–5.3)
RBC # BLD: 3.6 M/UL — LOW (ref 3.8–5.2)
RBC # FLD: 12.8 % — SIGNIFICANT CHANGE UP (ref 10.3–14.5)
SODIUM SERPL-SCNC: 139 MMOL/L — SIGNIFICANT CHANGE UP (ref 135–145)
WBC # BLD: 8.96 K/UL — SIGNIFICANT CHANGE UP (ref 3.8–10.5)
WBC # FLD AUTO: 8.96 K/UL — SIGNIFICANT CHANGE UP (ref 3.8–10.5)

## 2023-05-04 PROCEDURE — 73560 X-RAY EXAM OF KNEE 1 OR 2: CPT | Mod: 26,LT

## 2023-05-04 PROCEDURE — 27447 TOTAL KNEE ARTHROPLASTY: CPT | Mod: LT

## 2023-05-04 DEVICE — VAN PS OPEN INTL FEM-LT 60: Type: IMPLANTABLE DEVICE | Status: FUNCTIONAL

## 2023-05-04 DEVICE — IMPLANTABLE DEVICE: Type: IMPLANTABLE DEVICE | Status: FUNCTIONAL

## 2023-05-04 DEVICE — PACK PIN ODYSSEY STRL: Type: IMPLANTABLE DEVICE | Status: FUNCTIONAL

## 2023-05-04 DEVICE — BEARING TIB VANGUARD VE PS 63/67X12MM: Type: IMPLANTABLE DEVICE | Status: FUNCTIONAL

## 2023-05-04 RX ORDER — ATORVASTATIN CALCIUM 80 MG/1
80 TABLET, FILM COATED ORAL AT BEDTIME
Refills: 0 | Status: DISCONTINUED | OUTPATIENT
Start: 2023-05-04 | End: 2023-05-05

## 2023-05-04 RX ORDER — OXCARBAZEPINE 300 MG/1
300 TABLET, FILM COATED ORAL
Refills: 0 | Status: DISCONTINUED | OUTPATIENT
Start: 2023-05-04 | End: 2023-05-05

## 2023-05-04 RX ORDER — ESCITALOPRAM OXALATE 10 MG/1
20 TABLET, FILM COATED ORAL DAILY
Refills: 0 | Status: DISCONTINUED | OUTPATIENT
Start: 2023-05-04 | End: 2023-05-05

## 2023-05-04 RX ORDER — OXYCODONE HYDROCHLORIDE 5 MG/1
5 TABLET ORAL ONCE
Refills: 0 | Status: DISCONTINUED | OUTPATIENT
Start: 2023-05-04 | End: 2023-05-04

## 2023-05-04 RX ORDER — ACETAMINOPHEN 500 MG
1000 TABLET ORAL ONCE
Refills: 0 | Status: COMPLETED | OUTPATIENT
Start: 2023-05-04 | End: 2023-05-04

## 2023-05-04 RX ORDER — ACETAMINOPHEN 500 MG
2 TABLET ORAL
Qty: 0 | Refills: 0 | DISCHARGE
Start: 2023-05-04

## 2023-05-04 RX ORDER — POLYETHYLENE GLYCOL 3350 17 G/17G
17 POWDER, FOR SOLUTION ORAL
Qty: 0 | Refills: 0 | DISCHARGE
Start: 2023-05-04

## 2023-05-04 RX ORDER — ENOXAPARIN SODIUM 100 MG/ML
30 INJECTION SUBCUTANEOUS EVERY 12 HOURS
Refills: 0 | Status: DISCONTINUED | OUTPATIENT
Start: 2023-05-05 | End: 2023-05-05

## 2023-05-04 RX ORDER — CEFAZOLIN SODIUM 1 G
2000 VIAL (EA) INJECTION ONCE
Refills: 0 | Status: COMPLETED | OUTPATIENT
Start: 2023-05-04 | End: 2023-05-04

## 2023-05-04 RX ORDER — SENNA PLUS 8.6 MG/1
2 TABLET ORAL
Qty: 0 | Refills: 0 | DISCHARGE
Start: 2023-05-04

## 2023-05-04 RX ORDER — DEXAMETHASONE 0.5 MG/5ML
8 ELIXIR ORAL ONCE
Refills: 0 | Status: COMPLETED | OUTPATIENT
Start: 2023-05-05 | End: 2023-05-05

## 2023-05-04 RX ORDER — SODIUM CHLORIDE 9 MG/ML
500 INJECTION INTRAMUSCULAR; INTRAVENOUS; SUBCUTANEOUS ONCE
Refills: 0 | Status: COMPLETED | OUTPATIENT
Start: 2023-05-04 | End: 2023-05-04

## 2023-05-04 RX ORDER — SODIUM CHLORIDE 9 MG/ML
1000 INJECTION, SOLUTION INTRAVENOUS
Refills: 0 | Status: DISCONTINUED | OUTPATIENT
Start: 2023-05-04 | End: 2023-05-04

## 2023-05-04 RX ORDER — ONDANSETRON 8 MG/1
4 TABLET, FILM COATED ORAL ONCE
Refills: 0 | Status: DISCONTINUED | OUTPATIENT
Start: 2023-05-04 | End: 2023-05-04

## 2023-05-04 RX ORDER — CEFAZOLIN SODIUM 1 G
2000 VIAL (EA) INJECTION EVERY 8 HOURS
Refills: 0 | Status: COMPLETED | OUTPATIENT
Start: 2023-05-04 | End: 2023-05-05

## 2023-05-04 RX ORDER — TRANEXAMIC ACID 100 MG/ML
1000 INJECTION, SOLUTION INTRAVENOUS ONCE
Refills: 0 | Status: COMPLETED | OUTPATIENT
Start: 2023-05-04 | End: 2023-05-04

## 2023-05-04 RX ORDER — CHLORHEXIDINE GLUCONATE 213 G/1000ML
1 SOLUTION TOPICAL ONCE
Refills: 0 | Status: COMPLETED | OUTPATIENT
Start: 2023-05-04 | End: 2023-05-04

## 2023-05-04 RX ORDER — HYDROMORPHONE HYDROCHLORIDE 2 MG/ML
0.5 INJECTION INTRAMUSCULAR; INTRAVENOUS; SUBCUTANEOUS
Refills: 0 | Status: DISCONTINUED | OUTPATIENT
Start: 2023-05-04 | End: 2023-05-04

## 2023-05-04 RX ORDER — HYDROMORPHONE HYDROCHLORIDE 2 MG/ML
1 INJECTION INTRAMUSCULAR; INTRAVENOUS; SUBCUTANEOUS
Refills: 0 | Status: DISCONTINUED | OUTPATIENT
Start: 2023-05-04 | End: 2023-05-04

## 2023-05-04 RX ORDER — CHOLECALCIFEROL (VITAMIN D3) 125 MCG
1000 CAPSULE ORAL DAILY
Refills: 0 | Status: DISCONTINUED | OUTPATIENT
Start: 2023-05-04 | End: 2023-05-05

## 2023-05-04 RX ADMIN — CELECOXIB 200 MILLIGRAM(S): 200 CAPSULE ORAL at 22:36

## 2023-05-04 RX ADMIN — SODIUM CHLORIDE 500 MILLILITER(S): 9 INJECTION INTRAMUSCULAR; INTRAVENOUS; SUBCUTANEOUS at 19:54

## 2023-05-04 RX ADMIN — HYDROMORPHONE HYDROCHLORIDE 0.5 MILLIGRAM(S): 2 INJECTION INTRAMUSCULAR; INTRAVENOUS; SUBCUTANEOUS at 17:00

## 2023-05-04 RX ADMIN — CHLORHEXIDINE GLUCONATE 1 APPLICATION(S): 213 SOLUTION TOPICAL at 12:42

## 2023-05-04 RX ADMIN — OXCARBAZEPINE 300 MILLIGRAM(S): 300 TABLET, FILM COATED ORAL at 18:20

## 2023-05-04 RX ADMIN — ATORVASTATIN CALCIUM 80 MILLIGRAM(S): 80 TABLET, FILM COATED ORAL at 22:42

## 2023-05-04 RX ADMIN — CELECOXIB 200 MILLIGRAM(S): 200 CAPSULE ORAL at 22:31

## 2023-05-04 RX ADMIN — OXYCODONE HYDROCHLORIDE 5 MILLIGRAM(S): 5 TABLET ORAL at 18:54

## 2023-05-04 RX ADMIN — SODIUM CHLORIDE 75 MILLILITER(S): 9 INJECTION, SOLUTION INTRAVENOUS at 16:27

## 2023-05-04 RX ADMIN — Medication 100 MILLIGRAM(S): at 22:30

## 2023-05-04 RX ADMIN — OXYCODONE HYDROCHLORIDE 5 MILLIGRAM(S): 5 TABLET ORAL at 18:25

## 2023-05-04 RX ADMIN — SODIUM CHLORIDE 500 MILLILITER(S): 9 INJECTION INTRAMUSCULAR; INTRAVENOUS; SUBCUTANEOUS at 16:27

## 2023-05-04 RX ADMIN — Medication 1000 MILLIGRAM(S): at 23:03

## 2023-05-04 RX ADMIN — HYDROMORPHONE HYDROCHLORIDE 0.5 MILLIGRAM(S): 2 INJECTION INTRAMUSCULAR; INTRAVENOUS; SUBCUTANEOUS at 16:32

## 2023-05-04 RX ADMIN — Medication 400 MILLIGRAM(S): at 23:01

## 2023-05-04 RX ADMIN — ESCITALOPRAM OXALATE 20 MILLIGRAM(S): 10 TABLET, FILM COATED ORAL at 22:31

## 2023-05-04 NOTE — PHYSICAL THERAPY INITIAL EVALUATION ADULT - ADDITIONAL COMMENTS
Pt lives with  in a private home, there are 5 stairs +HR to enter and 12 stairs +HR to the primary bedroom/bathroom. Pt reports she can also enter through the back of the home where there are 3 stairs +HR to enter. Pt has a walk in shower. PTA pt was independent with ADLs and ambulation with straight cane. Pt reports she utilized the straight cane due to increased pain and weakness in the left knee. Pt owns a RW and cane.

## 2023-05-04 NOTE — PHYSICAL THERAPY INITIAL EVALUATION ADULT - GAIT TRAINING, PT EVAL
Patient will ambulate 150 feet independently with a rolling walker within 1-2 days for safe community ambulation. Patient will ascend/descend 12 stairs independently with +HR and straight cane within 1-2 days for safe household stair negotiation.

## 2023-05-04 NOTE — CONSULT NOTE ADULT - SUBJECTIVE AND OBJECTIVE BOX
Patient is a 82y old  Female who presents with a chief complaint of left knee pain.    HPI:        PAST MEDICAL & SURGICAL HISTORY:  Anxiety      Depression      Hyperlipidemia      GERD (gastroesophageal reflux disease)      History of vascular disease  venous; has swelling      Osteoarthritis of left knee      HLD (hyperlipidemia)      S/P foot surgery, right  right great toe surgery      S/P wrist surgery  right      S/P tonsillectomy      S/P cataract surgery  bilateral      S/P total knee replacement, right          Allergies    No Known Drug Allergies  adhesives (Pruritus; Rash)    Intolerances    lactose (Diarrhea)      Home Medications:  acetaminophen 500 mg oral tablet: 2 tab(s) orally every 8 hours (04 May 2023 18:09)  aspirin 81 mg oral tablet: orally once a day (04 May 2023 12:08)  Crestor 40 mg oral tablet: 1 tab(s) orally once a day (at bedtime) (04 May 2023 12:08)  Lexapro 20 mg oral tablet: 1 tab(s) orally once a day (04 May 2023 12:08)  polyethylene glycol 3350 oral powder for reconstitution: 17 gram(s) orally once a day (at bedtime) as needed for  constipation (04 May 2023 18:09)  senna leaf extract oral tablet: 2 tab(s) orally once a day (at bedtime) as needed for  constipation (04 May 2023 18:09)  Trileptal 300 mg oral tablet: 1 tab(s) orally 2 times a day (04 May 2023 12:08)  Vitamin D3 1000 intl units (25 mcg) oral tablet: 1 tab(s) orally once a day (04 May 2023 12:08)      MEDICATIONS  (STANDING):  acetaminophen   IVPB .. 1000 milliGRAM(s) IV Intermittent once  atorvastatin 80 milliGRAM(s) Oral at bedtime  ceFAZolin   IVPB 2000 milliGRAM(s) IV Intermittent every 8 hours  celecoxib 200 milliGRAM(s) Oral every 12 hours  cholecalciferol 1000 Unit(s) Oral daily  escitalopram 20 milliGRAM(s) Oral daily  lactated ringers. 1000 milliLiter(s) (125 mL/Hr) IV Continuous <Continuous>  OXcarbazepine 300 milliGRAM(s) Oral two times a day  pantoprazole    Tablet 40 milliGRAM(s) Oral before breakfast  polyethylene glycol 3350 17 Gram(s) Oral at bedtime  senna 2 Tablet(s) Oral at bedtime  sodium chloride 0.9% Bolus 500 milliLiter(s) IV Bolus once    MEDICATIONS  (PRN):  aluminum hydroxide/magnesium hydroxide/simethicone Suspension 30 milliLiter(s) Oral four times a day PRN Indigestion  HYDROmorphone  Injectable 0.5 milliGRAM(s) IV Push every 3 hours PRN breakthrough pain  magnesium hydroxide Suspension 30 milliLiter(s) Oral daily PRN Constipation  ondansetron Injectable 4 milliGRAM(s) IV Push every 6 hours PRN Nausea and/or Vomiting  oxyCODONE    IR 5 milliGRAM(s) Oral every 3 hours PRN Moderate Pain (4 - 6)  oxyCODONE    IR 10 milliGRAM(s) Oral every 3 hours PRN Severe Pain (7 - 10)      FAMILY HISTORY:  Family history of vascular disease  sister     Family history- stomach cancer  father     Social History:     REVIEW OF SYSTEMS:  CONSTITUTIONAL: No fever or chills.   EYES: No eye pain or discharge  ENMT: No sinus or throat pain  NECK: No pain or stiffness  BREASTS: No pain, masses, or nipple discharge  RESPIRATORY: No cough or shortness of breath  CARDIOVASCULAR: No chest pain, palpitations, dizziness.   GASTROINTESTINAL: No abdominal or epigastric pain. No nausea or vomiting.  GENITOURINARY: No dysuria, hematuria, or incontinence  NEUROLOGICAL: No headaches,  numbness, or tremors  SKIN: No itching, burning, rashes, or lesions   LYMPH NODES: No enlarged glands  ENDOCRINE: No polydipsia or polyuria  MUSCULOSKELETAL: Left knee pain.   PSYCHIATRIC: No difficulty sleeping  HEME/LYMPH: No easy bruising, or bleeding gums  ALLERGY AND IMMUNOLOGIC: No hives or eczema    Vital Signs Last 24 Hrs  T(C): 36.2 (04 May 2023 17:17), Max: 36.4 (04 May 2023 11:41)  T(F): 97.2 (04 May 2023 17:17), Max: 97.5 (04 May 2023 11:41)  HR: 79 (04 May 2023 17:17) (68 - 82)  BP: 112/63 (04 May 2023 17:17) (97/45 - 132/66)  BP(mean): --  RR: 16 (04 May 2023 17:17) (14 - 22)  SpO2: 97% (04 May 2023 17:17) (95% - 98%)    Parameters below as of 04 May 2023 17:17  Patient On (Oxygen Delivery Method): nasal cannula        PHYSICAL EXAM:  GENERAL: NAD, well-groomed, well-developed  HEAD:  Atraumatic, Normocephalic  EYES:  Pallor +  ENMT: Moist mucous membranes, no lesions  NECK: Supple, No JVD, Normal thyroid  CHEST/LUNG: Decreased breath sounds at bases, rest is clear.   HEART: Regular rate and rhythm; No murmurs, rubs, or gallops  ABDOMEN: Soft, Nontender, Nondistended; Bowel sounds present  EXTREMITIES:  Pulses +  LYMPH: No lymphadenopathy noted  SKIN: No rashes or lesions  NERVOUS SYSTEM:  No focal deficit.   PSYCH:  Awake and alert.    LABS:              CAPILLARY BLOOD GLUCOSE            RADIOLOGY & ADDITIONAL TESTS:    Imaging Personally Reviewed:  [X] YES  [ ] NO   Patient is a 82y old  Female who presents with a chief complaint of left knee pain.    HPI: 82-year-old female with past medical history of hyperlipidemia, GERD, depression, anxiety, varicose veins, osteoarthritis, who has been having increasing pain in her left knee.  Patient had failed outpatient treatment with conservative management.  She was advised left total knee arthroplasty.  Patient is seen postoperatively for medical comanagement.    outpatient records are reviewed.  Patient complains of left knee pain.  She denies any chest pain or chest pressure.  She denies any nausea or vomiting.  She denies any fevers or chills.    PAST MEDICAL & SURGICAL HISTORY:  Anxiety  Depression  Hyperlipidemia  GERD (gastroesophageal reflux disease)  History of vascular disease  venous; has swelling  Osteoarthritis of left knee  HLD (hyperlipidemia)  S/P foot surgery, right  right great toe surgery  S/P wrist surgery  right  S/P tonsillectomy  S/P cataract surgery  bilateral  S/P total knee replacement, right    Allergies: No Known Drug Allergies  adhesives (Pruritus; Rash)    Intolerances    lactose (Diarrhea)      Home Medications:  acetaminophen 500 mg oral tablet: 2 tab(s) orally every 8 hours (04 May 2023 18:09)  aspirin 81 mg oral tablet: orally once a day (04 May 2023 12:08)  Crestor 40 mg oral tablet: 1 tab(s) orally once a day (at bedtime) (04 May 2023 12:08)  Lexapro 20 mg oral tablet: 1 tab(s) orally once a day (04 May 2023 12:08)  polyethylene glycol 3350 oral powder for reconstitution: 17 gram(s) orally once a day (at bedtime) as needed for  constipation (04 May 2023 18:09)  senna leaf extract oral tablet: 2 tab(s) orally once a day (at bedtime) as needed for  constipation (04 May 2023 18:09)  Trileptal 300 mg oral tablet: 1 tab(s) orally 2 times a day (04 May 2023 12:08)  Vitamin D3 1000 intl units (25 mcg) oral tablet: 1 tab(s) orally once a day (04 May 2023 12:08)      MEDICATIONS  (STANDING):  acetaminophen   IVPB .. 1000 milliGRAM(s) IV Intermittent once  atorvastatin 80 milliGRAM(s) Oral at bedtime  ceFAZolin   IVPB 2000 milliGRAM(s) IV Intermittent every 8 hours  celecoxib 200 milliGRAM(s) Oral every 12 hours  cholecalciferol 1000 Unit(s) Oral daily  escitalopram 20 milliGRAM(s) Oral daily  lactated ringers. 1000 milliLiter(s) (125 mL/Hr) IV Continuous <Continuous>  OXcarbazepine 300 milliGRAM(s) Oral two times a day  pantoprazole    Tablet 40 milliGRAM(s) Oral before breakfast  polyethylene glycol 3350 17 Gram(s) Oral at bedtime  senna 2 Tablet(s) Oral at bedtime  sodium chloride 0.9% Bolus 500 milliLiter(s) IV Bolus once    MEDICATIONS  (PRN):  aluminum hydroxide/magnesium hydroxide/simethicone Suspension 30 milliLiter(s) Oral four times a day PRN Indigestion  HYDROmorphone  Injectable 0.5 milliGRAM(s) IV Push every 3 hours PRN breakthrough pain  magnesium hydroxide Suspension 30 milliLiter(s) Oral daily PRN Constipation  ondansetron Injectable 4 milliGRAM(s) IV Push every 6 hours PRN Nausea and/or Vomiting  oxyCODONE    IR 5 milliGRAM(s) Oral every 3 hours PRN Moderate Pain (4 - 6)  oxyCODONE    IR 10 milliGRAM(s) Oral every 3 hours PRN Severe Pain (7 - 10)      FAMILY HISTORY:  Family history of vascular disease  sister     Family history- stomach cancer  father     Social History: no current history of smoking, or alcohol abuse.    REVIEW OF SYSTEMS:  CONSTITUTIONAL: No fever or chills.   EYES: No eye pain or discharge  ENMT: No sinus or throat pain  NECK: No pain or stiffness  BREASTS: No pain, masses, or nipple discharge  RESPIRATORY: No cough or shortness of breath  CARDIOVASCULAR: No chest pain, palpitations, dizziness.   GASTROINTESTINAL: No abdominal or epigastric pain. No nausea or vomiting.  GENITOURINARY: No dysuria, hematuria, or incontinence  NEUROLOGICAL: No headaches,  numbness, or tremors  SKIN: No itching, burning, rashes, or lesions   LYMPH NODES: No enlarged glands  ENDOCRINE: No polydipsia or polyuria  MUSCULOSKELETAL: Left knee pain.   PSYCHIATRIC: No difficulty sleeping  HEME/LYMPH: No easy bruising, or bleeding gums  ALLERGY AND IMMUNOLOGIC: No hives or eczema    Vital Signs Last 24 Hrs  T(C): 36.2 (04 May 2023 17:17), Max: 36.4 (04 May 2023 11:41)  T(F): 97.2 (04 May 2023 17:17), Max: 97.5 (04 May 2023 11:41)  HR: 79 (04 May 2023 17:17) (68 - 82)  BP: 112/63 (04 May 2023 17:17) (97/45 - 132/66)  BP(mean): --  RR: 16 (04 May 2023 17:17) (14 - 22)  SpO2: 97% (04 May 2023 17:17) (95% - 98%)    Parameters below as of 04 May 2023 17:17  Patient On (Oxygen Delivery Method): nasal cannula        PHYSICAL EXAM:  GENERAL: NAD, well-groomed, well-developed  HEAD:  Atraumatic, Normocephalic  EYES:  Pallor +  ENMT: Moist mucous membranes, no lesions  NECK: Supple, No JVD, Normal thyroid  CHEST/LUNG: Decreased breath sounds at bases, rest is clear.   HEART: Regular rate and rhythm; No murmurs, rubs, or gallops  ABDOMEN: Soft, Nontender, Nondistended; Bowel sounds present  EXTREMITIES: left knee dressing is noted.  No calf tenderness noted. Pulses +  LYMPH: No lymphadenopathy noted  SKIN: No rashes or lesions  NERVOUS SYSTEM:  No focal deficit.   PSYCH:  Awake and alert.    LABS:    Complete Blood Count (23 @ 10:41)   Nucleated RBC: 0 /100 WBCs  WBC Count: 5.13 K/uL  RBC Count: 3.89 M/uL  Hemoglobin: 12.0 g/dL  Hematocrit: 35.8 %  Mean Cell Volume: 92.0 fl  Mean Cell Hemoglobin: 30.8 pg  Mean Cell Hemoglobin Conc: 33.5 gm/dL  Red Cell Distrib Width: 13.1 %  Platelet Count - Automated: 240 K/uL  Comprehensive Metabolic Panel (23 @ 10:41)   Sodium, Serum: 144 mmol/L  Potassium, Serum: 4.3 mmol/L  Chloride, Serum: 108 mmol/L  Carbon Dioxide, Serum: 27 mmol/L  Anion Gap, Serum: 9 mmol/L  Blood Urea Nitrogen, Serum: 21 mg/dL  Creatinine, Serum: 0.56 mg/dL  Glucose, Serum: 96 mg/dL  Calcium, Total Serum: 9.3 mg/dL  Protein Total, Serum: 7.3 g/dL  Albumin, Serum: 3.5 g/dL  Bilirubin Total, Serum: 0.2 mg/dL  Alkaline Phosphatase, Serum: 97 U/L  Aspartate Aminotransferase (AST/SGOT): 31 U/L  Alanine Aminotransferase (ALT/SGPT): 26 U/L DA    A1C with Estimated Average Glucose (23 @ 10:41)   A1C with Estimated Average Glucose Result: 6.0 %  Estimated Average Glucose: 126    RADIOLOGY & ADDITIONAL TESTS:    Imaging Personally Reviewed:  [X] YES  [ ] NO

## 2023-05-04 NOTE — BRIEF OPERATIVE NOTE - VENOUS THROMBOEMBOLISM PROPHYLAXIS THERAPY
venodyne right leg, then venodynes bilat and lovenox postop (caprini 10, trileptil interaction w/ DOAC)

## 2023-05-04 NOTE — DISCHARGE NOTE PROVIDER - INSTRUCTIONS
For Constipation :   • Increase your water intake. Drink at least 8 glasses of water daily.  • Try adding fiber to your diet by eating fruits, vegetables and foods that are rich in grains.  • If you do experience constipation, you may take an over-the-counter stool softener/laxative such as Alanna Colace, Senekot, miralax or  Milk of Magnesia.

## 2023-05-04 NOTE — DISCHARGE NOTE PROVIDER - NSDCCAREPROVSEEN_GEN_ALL_CORE_FT
Bea, Denzel Soares, Jennifer VALENZUELA PA-C Bea, Denzel Soares, GIOVANA Sweeney, [facility]

## 2023-05-04 NOTE — DISCHARGE NOTE PROVIDER - HOSPITAL COURSE
The patient is an 82  y.o. female with severe osteoarthritis of the left knee.  She was evaluated by the PST dept at [facility] and obtained the appropriate medical clearances.  After admission on 5/4/23 and receiving pre-operative parenteral prophylactic antibiotics (ancef), the patient  underwent an  uncomplicated left TKA by orthopedic surgeon Dr. Denzel Figueredo.    A medical consultation from the Hospitalist service was obtained for post-operative medical co-management. Typical Physical & occupational therapy modalities post TKA were performed including ambulation training, range of motion, ADL's, and transfers. Lovenox 30 mg every 12 hrs, along w/ bilat venodynes, was given for DVT prophylaxis (caprini 10 and trileptal interaction w/ DOACs).  The patient had a clean appearing surgical incision with no sign of surgical site infections and had a stable neuro / vascular exam of the operated extremity.  After progression of mobility guided by the PT/ OT staff,  the patient was felt to benefit from further rehabilitative care for restoration to level of function. This was felt to best be accomplished at  rehab.  Discharge and Orthopedic Care instructions were delineated in the Discharge Plan and reviewed with the patient. All medications were delineated in the medication reconciliation tool and key points were reviewed with the patient. They were deemed stable from an Orthopedic & medical standpoint for discharge today.  Upon discharge from the rehab facility she will be  following up with Dr. Figueredo for office  follow up Orthopedic care.

## 2023-05-04 NOTE — DISCHARGE NOTE PROVIDER - NSDCCPTREATMENT_GEN_ALL_CORE_FT
PRINCIPAL PROCEDURE  Procedure: Left total knee arthroplasty  Findings and Treatment: osteoarthritis left knee

## 2023-05-04 NOTE — CONSULT NOTE ADULT - ASSESSMENT
82-year-old female with past medical history of hyperlipidemia, GERD, depression, anxiety, varicose veins, osteoarthritis, who has been having increasing pain in her left knee.  Patient had failed outpatient treatment with conservative management.  She was advised left total knee arthroplasty.  Patient is seen postoperatively for medical comanagement.

## 2023-05-04 NOTE — DISCHARGE NOTE PROVIDER - NSDCFUSCHEDAPPT_GEN_ALL_CORE_FT
[facility]  ORTHOSURG [address] [facility]  Scheduled Appointment: 05/17/2023

## 2023-05-04 NOTE — DISCHARGE NOTE PROVIDER - NSDCMRMEDTOKEN_GEN_ALL_CORE_FT
aspirin 81 mg oral tablet: orally once a day  Crestor 40 mg oral tablet: 1 tab(s) orally once a day (at bedtime)  Lexapro 20 mg oral tablet: 1 tab(s) orally once a day  Trileptal 300 mg oral tablet: 1 tab(s) orally 2 times a day  Vitamin D3 1000 intl units (25 mcg) oral tablet: 1 tab(s) orally once a day   acetaminophen 500 mg oral tablet: 2 tab(s) orally every 8 hours  aspirin 81 mg oral tablet: orally once a day  Crestor 40 mg oral tablet: 1 tab(s) orally once a day (at bedtime)  Lexapro 20 mg oral tablet: 1 tab(s) orally once a day  polyethylene glycol 3350 oral powder for reconstitution: 17 gram(s) orally once a day (at bedtime) as needed for  constipation  senna leaf extract oral tablet: 2 tab(s) orally once a day (at bedtime) as needed for  constipation  Trileptal 300 mg oral tablet: 1 tab(s) orally 2 times a day  Vitamin D3 1000 intl units (25 mcg) oral tablet: 1 tab(s) orally once a day   acetaminophen 500 mg oral tablet: 2 tab(s) orally every 8 hours  celecoxib 200 mg oral capsule: 1 cap(s) orally every 12 hours  Crestor 40 mg oral tablet: 1 tab(s) orally once a day (at bedtime)  Ecotrin Adult Low Strength 81 mg oral delayed release tablet: 1 tab(s) orally every 12 hours  enoxaparin: 30 milligram(s) subcutaneous every 12 hours  Lexapro 20 mg oral tablet: 1 tab(s) orally once a day  oxyCODONE 10 mg oral tablet: 1 tab(s) orally every 4 hours as needed for Severe Pain (7 - 10)  oxyCODONE 5 mg oral tablet: 1 tab(s) orally every 4 hours as needed for Moderate Pain (4 - 6)  pantoprazole 40 mg oral delayed release tablet: 1 tab(s) orally once a day (before a meal)  polyethylene glycol 3350 oral powder for reconstitution: 17 gram(s) orally once a day (at bedtime) as needed for  constipation  senna leaf extract oral tablet: 2 tab(s) orally once a day (at bedtime) as needed for  constipation  Trileptal 300 mg oral tablet: 1 tab(s) orally 2 times a day  Vitamin D3 1000 intl units (25 mcg) oral tablet: 1 tab(s) orally once a day

## 2023-05-04 NOTE — DISCHARGE NOTE PROVIDER - CARE PROVIDER_API CALL
DEMETRIS Figueredo (MD)  Orthopaedic Surgery  [address], [address]  [place] [address]  Phone: [phone]  Fax: [phone]  Established Patient  Scheduled Appointment: 05/17/2023 11:45 AM

## 2023-05-04 NOTE — CONSULT NOTE ADULT - PROBLEM SELECTOR RECOMMENDATION 9
Patient is s/p left/right total knee arthroplasty, post op day # 0.  Continue IV hydration per routine.   Continue perioperative antibiotics as per protocol.   Patient will be started on Lovenox for DVT prophylaxis.  Patient will be started on multimodal pain management with Tylenol/Celebrex/Oxycodone as needed and Dilaudid as needed.   Continue physical therapy.  Encourage incentive spirometry.  Monitor for post op anemia  and post op fever.   Further management as per patient's clinical course.

## 2023-05-04 NOTE — PHYSICAL THERAPY INITIAL EVALUATION ADULT - CRITERIA FOR SKILLED THERAPEUTIC INTERVENTIONS
impairments found/rehab potential/therapy frequency/predicted duration of therapy intervention/anticipated equipment needs at discharge

## 2023-05-04 NOTE — DISCHARGE NOTE PROVIDER - PROVIDER TOKENS
PROVIDER:[TOKEN:[[id number]],SCHEDULEDAPPT:[05/17/2023],SCHEDULEDAPPTTIME:[11:45 AM],ESTABLISHEDPATIENT:[T]]

## 2023-05-04 NOTE — DISCHARGE NOTE PROVIDER - NSDCCPCAREPLAN_GEN_ALL_CORE_FT
PRINCIPAL DISCHARGE DIAGNOSIS  Diagnosis: Primary osteoarthritis of left knee  Assessment and Plan of Treatment: left tka  Physical Therapy/Occupational Therapy for: ambulation, transfers, stairs, ADL's (activities of daily living), range of motion exercises, and isometrics  -Activity  • Weight Bearing as tolerated with rolling walker.  • Take short, frequent walks increasing the distance that you walk each day as tolerated.  • Change your position every hour to decrease pain and stiffness.  • Continue the exercises taught to you by your physical therapist.  • No driving until cleared by the doctor.  • No tub baths, hot tubs, or swimming pools until instructed by your doctor.  • Do not squat down on the floor.  • Do not kneel or twist your knee.  • Range of Motion Goals: Flexion= 120 degrees, Extension = 0 degrees  Keep incision clean and dry. May shower 4 days after surgery if no drainage from incision.  Nylon was used for closure and may get slightly wet in shower as long as wound is dry  Suture removal 2 weeks after surgery at Surgeon's office.  Use cryocuff for 20 minute sessions w/ at least 1 hr between sessions.  Use a towel or washcloth under cuff; don't place directly on the skin.  Opioid safety :  Do not keep opioid in the medicine cabinet in bathroom where others may have access to it.  Do not drive while taking opioid.  To dispose of it some pharmacies do have drop boxes as do police stations.  Do not flush or put in trash.

## 2023-05-04 NOTE — DISCHARGE NOTE PROVIDER - NSCORESITESY/N_GEN_A_CORE_RD
----- Message from Sidney Hendricks MD sent at 9/30/2022 12:07 PM EDT -----  Please let family know that covid/flu is negative  No

## 2023-05-05 ENCOUNTER — TRANSCRIPTION ENCOUNTER (OUTPATIENT)
Age: 83
End: 2023-05-05

## 2023-05-05 VITALS
RESPIRATION RATE: 16 BRPM | DIASTOLIC BLOOD PRESSURE: 60 MMHG | HEART RATE: 71 BPM | OXYGEN SATURATION: 97 % | TEMPERATURE: 98 F | SYSTOLIC BLOOD PRESSURE: 99 MMHG

## 2023-05-05 DIAGNOSIS — M17.12 UNILATERAL PRIMARY OSTEOARTHRITIS, LEFT KNEE: ICD-10-CM

## 2023-05-05 DIAGNOSIS — Z29.9 ENCOUNTER FOR PROPHYLACTIC MEASURES, UNSPECIFIED: ICD-10-CM

## 2023-05-05 DIAGNOSIS — E78.5 HYPERLIPIDEMIA, UNSPECIFIED: ICD-10-CM

## 2023-05-05 DIAGNOSIS — K21.9 GASTRO-ESOPHAGEAL REFLUX DISEASE WITHOUT ESOPHAGITIS: ICD-10-CM

## 2023-05-05 DIAGNOSIS — F41.8 OTHER SPECIFIED ANXIETY DISORDERS: ICD-10-CM

## 2023-05-05 DIAGNOSIS — D62 ACUTE POSTHEMORRHAGIC ANEMIA: ICD-10-CM

## 2023-05-05 LAB
ANION GAP SERPL CALC-SCNC: 13 MMOL/L — SIGNIFICANT CHANGE UP (ref 5–17)
BUN SERPL-MCNC: 13 MG/DL — SIGNIFICANT CHANGE UP (ref 7–23)
CALCIUM SERPL-MCNC: 8.5 MG/DL — SIGNIFICANT CHANGE UP (ref 8.4–10.5)
CHLORIDE SERPL-SCNC: 104 MMOL/L — SIGNIFICANT CHANGE UP (ref 96–108)
CO2 SERPL-SCNC: 22 MMOL/L — SIGNIFICANT CHANGE UP (ref 22–31)
CREAT SERPL-MCNC: 0.64 MG/DL — SIGNIFICANT CHANGE UP (ref 0.5–1.3)
EGFR: 88 ML/MIN/1.73M2 — SIGNIFICANT CHANGE UP
GLUCOSE SERPL-MCNC: 116 MG/DL — HIGH (ref 70–99)
HCT VFR BLD CALC: 28 % — LOW (ref 34.5–45)
HGB BLD-MCNC: 9.1 G/DL — LOW (ref 11.5–15.5)
MCHC RBC-ENTMCNC: 30.4 PG — SIGNIFICANT CHANGE UP (ref 27–34)
MCHC RBC-ENTMCNC: 32.5 GM/DL — SIGNIFICANT CHANGE UP (ref 32–36)
MCV RBC AUTO: 93.6 FL — SIGNIFICANT CHANGE UP (ref 80–100)
NRBC # BLD: 0 /100 WBCS — SIGNIFICANT CHANGE UP (ref 0–0)
PLATELET # BLD AUTO: 183 K/UL — SIGNIFICANT CHANGE UP (ref 150–400)
POTASSIUM SERPL-MCNC: 4.5 MMOL/L — SIGNIFICANT CHANGE UP (ref 3.5–5.3)
POTASSIUM SERPL-SCNC: 4.5 MMOL/L — SIGNIFICANT CHANGE UP (ref 3.5–5.3)
RBC # BLD: 2.99 M/UL — LOW (ref 3.8–5.2)
RBC # FLD: 12.9 % — SIGNIFICANT CHANGE UP (ref 10.3–14.5)
SARS-COV-2 RNA SPEC QL NAA+PROBE: SIGNIFICANT CHANGE UP
SODIUM SERPL-SCNC: 139 MMOL/L — SIGNIFICANT CHANGE UP (ref 135–145)
WBC # BLD: 9.16 K/UL — SIGNIFICANT CHANGE UP (ref 3.8–10.5)
WBC # FLD AUTO: 9.16 K/UL — SIGNIFICANT CHANGE UP (ref 3.8–10.5)

## 2023-05-05 PROCEDURE — 97165 OT EVAL LOW COMPLEX 30 MIN: CPT

## 2023-05-05 PROCEDURE — 97161 PT EVAL LOW COMPLEX 20 MIN: CPT

## 2023-05-05 PROCEDURE — C1713: CPT

## 2023-05-05 PROCEDURE — C1889: CPT

## 2023-05-05 PROCEDURE — 27447 TOTAL KNEE ARTHROPLASTY: CPT

## 2023-05-05 PROCEDURE — 97116 GAIT TRAINING THERAPY: CPT

## 2023-05-05 PROCEDURE — 80048 BASIC METABOLIC PNL TOTAL CA: CPT

## 2023-05-05 PROCEDURE — 85027 COMPLETE CBC AUTOMATED: CPT

## 2023-05-05 PROCEDURE — 73560 X-RAY EXAM OF KNEE 1 OR 2: CPT

## 2023-05-05 PROCEDURE — 97530 THERAPEUTIC ACTIVITIES: CPT

## 2023-05-05 PROCEDURE — 87635 SARS-COV-2 COVID-19 AMP PRB: CPT

## 2023-05-05 PROCEDURE — C1776: CPT

## 2023-05-05 PROCEDURE — 36415 COLL VENOUS BLD VENIPUNCTURE: CPT

## 2023-05-05 RX ORDER — CELECOXIB 200 MG/1
1 CAPSULE ORAL
Qty: 0 | Refills: 0 | DISCHARGE
Start: 2023-05-05

## 2023-05-05 RX ORDER — PANTOPRAZOLE SODIUM 20 MG/1
1 TABLET, DELAYED RELEASE ORAL
Qty: 0 | Refills: 0 | DISCHARGE
Start: 2023-05-05

## 2023-05-05 RX ORDER — ENOXAPARIN SODIUM 100 MG/ML
30 INJECTION SUBCUTANEOUS
Qty: 0 | Refills: 0 | DISCHARGE
Start: 2023-05-05 | End: 2023-05-18

## 2023-05-05 RX ORDER — OXYCODONE HYDROCHLORIDE 5 MG/1
1 TABLET ORAL
Qty: 0 | Refills: 0 | DISCHARGE
Start: 2023-05-05

## 2023-05-05 RX ORDER — ASPIRIN/CALCIUM CARB/MAGNESIUM 324 MG
0 TABLET ORAL
Refills: 0 | DISCHARGE

## 2023-05-05 RX ADMIN — ENOXAPARIN SODIUM 30 MILLIGRAM(S): 100 INJECTION SUBCUTANEOUS at 21:16

## 2023-05-05 RX ADMIN — Medication 1000 UNIT(S): at 11:39

## 2023-05-05 RX ADMIN — CELECOXIB 200 MILLIGRAM(S): 200 CAPSULE ORAL at 21:16

## 2023-05-05 RX ADMIN — ATORVASTATIN CALCIUM 80 MILLIGRAM(S): 80 TABLET, FILM COATED ORAL at 21:16

## 2023-05-05 RX ADMIN — PANTOPRAZOLE SODIUM 40 MILLIGRAM(S): 20 TABLET, DELAYED RELEASE ORAL at 06:38

## 2023-05-05 RX ADMIN — Medication 1000 MILLIGRAM(S): at 06:40

## 2023-05-05 RX ADMIN — OXCARBAZEPINE 300 MILLIGRAM(S): 300 TABLET, FILM COATED ORAL at 06:38

## 2023-05-05 RX ADMIN — ESCITALOPRAM OXALATE 20 MILLIGRAM(S): 10 TABLET, FILM COATED ORAL at 11:39

## 2023-05-05 RX ADMIN — ENOXAPARIN SODIUM 30 MILLIGRAM(S): 100 INJECTION SUBCUTANEOUS at 08:40

## 2023-05-05 RX ADMIN — OXCARBAZEPINE 300 MILLIGRAM(S): 300 TABLET, FILM COATED ORAL at 17:34

## 2023-05-05 RX ADMIN — Medication 1000 MILLIGRAM(S): at 06:38

## 2023-05-05 RX ADMIN — Medication 100 MILLIGRAM(S): at 06:38

## 2023-05-05 RX ADMIN — CELECOXIB 200 MILLIGRAM(S): 200 CAPSULE ORAL at 08:40

## 2023-05-05 RX ADMIN — Medication 101.6 MILLIGRAM(S): at 07:11

## 2023-05-05 RX ADMIN — Medication 1000 MILLIGRAM(S): at 13:53

## 2023-05-05 RX ADMIN — Medication 1000 MILLIGRAM(S): at 21:16

## 2023-05-05 RX ADMIN — CELECOXIB 200 MILLIGRAM(S): 200 CAPSULE ORAL at 08:42

## 2023-05-05 RX ADMIN — Medication 1000 MILLIGRAM(S): at 13:49

## 2023-05-05 NOTE — DISCHARGE NOTE NURSING/CASE MANAGEMENT/SOCIAL WORK - PATIENT PORTAL LINK FT
You can access the FollowMyHealth Patient Portal offered by [facility] by registering at the following website: [email or web address]. By joining [facility]’s FollowMyHealth portal, you will also be able to view your health information using other applications (apps) compatible with our system.

## 2023-05-05 NOTE — OCCUPATIONAL THERAPY INITIAL EVALUATION ADULT - NSOTDISCHREC_GEN_A_CORE
Pt would benefit from continued OT in inpatient rehabilitation facility due to impaired balance, strength and endurance to optimize pt's function, safety and maximize return to prior level of functioning. Pt is a fall risk SW made aware./Sub-acute Rehab

## 2023-05-05 NOTE — CARE COORDINATION ASSESSMENT. - NSPASTMEDSURGHISTORY_GEN_ALL_CORE_FT
PAST MEDICAL & SURGICAL HISTORY:  Depression      Anxiety      History of vascular disease  venous; has swelling      GERD (gastroesophageal reflux disease)      Hyperlipidemia      S/P cataract surgery  bilateral      S/P tonsillectomy      S/P wrist surgery  right      S/P foot surgery, right  right great toe surgery      HLD (hyperlipidemia)      Osteoarthritis of left knee      S/P total knee replacement, right

## 2023-05-05 NOTE — CARE COORDINATION ASSESSMENT. - ASSESSMENT CONCERNS TO BE ADDRESSED
Pt is a 82-year-old female who lives home with spouse. Admitted for left TKR.  Pt ambulated with cane pta. She is alert and oriented.  Pt asking for VALERIE as her spouse is unable to assist her at home.  She has been to [facility] in past and this is first choice.  List provided for additional choices.  ELIAS requested./care coordination

## 2023-05-05 NOTE — PROGRESS NOTE ADULT - SUBJECTIVE AND OBJECTIVE BOX
Discharge medication calendar:  Enoxaparin 30 mg q12h x 2 weeks then Aspirin 81 mg q12h x 2 weeks  Pantoprazole 40mg QAM x 4 weeks  Celecoxib 200mg q12h x 2-3 weeks  APAP 1000mg q8h x 2-3 weeks  Narcotic PRN  Docusate 100mg TID while taking narcotic  Miralax, Senna, or Bisacodyl PRN for treatment of constipation  
Procedure:  Left TKR  POD #: 1  Currently sitting in bedside chair.  S: Pt without complaints. No SOB,CP, N/V. Tolerated Diet well. Some ant shin pain left lower leg.    Knee pain comfortable on tylenol and celbrex; oxy 5 used once.  No BM yet, + flatus, No abdominal pain.  Pain Rx:  acetaminophen     Tablet .. 1000 milliGRAM(s) Oral every 8 hours  celecoxib 200 milliGRAM(s) Oral every 12 hours  escitalopram 20 milliGRAM(s) Oral daily  HYDROmorphone  Injectable 0.5 milliGRAM(s) IV Push every 3 hours PRN  ondansetron Injectable 4 milliGRAM(s) IV Push every 6 hours PRN  OXcarbazepine 300 milliGRAM(s) Oral two times a day  oxyCODONE    IR 5 milliGRAM(s) Oral every 3 hours PRN  oxyCODONE    IR 10 milliGRAM(s) Oral every 3 hours PRN    O: General: On exam, No Apparent Distress  Vital Signs Last 24 Hrs  T(C): 36.4 (05 May 2023 08:00), Max: 36.4 (04 May 2023 11:41)  T(F): 97.6 (05 May 2023 08:00), Max: 97.6 (05 May 2023 03:30)  HR: 70 (05 May 2023 08:00) (68 - 82)  BP: 94/59 (05 May 2023 08:00) (94/59 - 132/66)  BP(mean): --  RR: 16 (05 May 2023 08:00) (14 - 22)  SpO2: 98% (05 May 2023 08:00) (93% - 98%)    Parameters below as of 05 May 2023 08:00  Patient On (Oxygen Delivery Method): nasal cannula        Lungs: BS clear bilat.  Heart: RRR no murmur  Abdomen: + BS, soft , benign exam     Ext -- left knee ace dressing dry, hemovac in place.  Flexion to 85 deg in chair.  Neurologic:  Has sensation over feet & toes bilat. Full AROM bilat feet & toes. EHL/AT = 5/5  Vascular: Feet toes warm, pink. DP =1+. No calf tenderness bilat..  VTEP: On Bilat. Venodynes + enoxaparin Injectable 30 milliGRAM(s) SubCutaneous every 12 hours    I&O's Detail    04 May 2023 07:01  -  05 May 2023 07:00  --------------------------------------------------------  IN:    Lactated Ringers: 1125 mL    Sodium Chloride 0.9% Bolus: 500 mL  Total IN: 1625 mL    OUT:    Accordian (mL): 130 mL    Blood Loss (mL): 150 mL    Voided (mL): 600 mL  Total OUT: 880 mL    Total NET: 745 mL          Activity in PT yesterday : Walked 10'   Labs yesterday noted.  Hospitalist input noted.  Labs Today:                         9.1    9.16  )-----------( 183      ( 05 May 2023 06:00 )             28.0       05-04    139  |  104  |  12  ----------------------------<  196<H>  4.4   |  28  |  0.70    Ca    8.8      04 May 2023 20:15        Primary Orthopedic Assessment:  • Stable from Orthopedic perspective  • Neuro motor exam stable  • Labs: CBC--postop anemia well tolerated / Chem stable      Plan:   • Continue:  PT/OT/WBAT with assistance of a walker/Ice to knee/ Knee ROM         Incentive spirometry encouraged   • Continue DVT prophylaxis as prescribed, including use of compression devices and ankle pumps  • Continue Pain Rx  • Plans per Medicine   • Discharge planning – anticipated discharge is Subacute Rehab facility (pt request) when medically stable & cleared by PT/OT  
  Patient is a 82y old  Female who presents with a chief complaint of left knee pain.    HPI: 82-year-old female with past medical history of hyperlipidemia, GERD, depression, anxiety, varicose veins, osteoarthritis, who has been having increasing pain in her left knee.  Patient had failed outpatient treatment with conservative management.  She was advised left total knee arthroplasty.  Patient is seen postoperatively for medical comanagement.    INTERVAL HPI/OVERNIGHT EVENTS:  Chart reviewed, notes reviewed.  Patient seen and examined.  Pain is fairly controlled with medications.  Ambulated with PT.   Doing incentive spirometry on regular basis.  Pain Location & Control: Left knee, controlled.     05/05/2023 --> Doing well. Pain is fairly controlled. No chest pain or pressure. No nausea or vomiting. No fever or chills.     PAST MEDICAL & SURGICAL HISTORY:  Anxiety    Depression    Hyperlipidemia    GERD (gastroesophageal reflux disease)      History of vascular disease  venous; has swelling      Osteoarthritis of left knee      HLD (hyperlipidemia)      S/P foot surgery, right  right great toe surgery      S/P wrist surgery  right      S/P tonsillectomy      S/P cataract surgery  bilateral      S/P total knee replacement, right          Allergies    No Known Drug Allergies  adhesives (Pruritus; Rash)    Intolerances    lactose (Diarrhea)      Home Medications:  acetaminophen 500 mg oral tablet: 2 tab(s) orally every 8 hours (04 May 2023 18:09)  aspirin 81 mg oral tablet: orally once a day (04 May 2023 12:08)  Crestor 40 mg oral tablet: 1 tab(s) orally once a day (at bedtime) (04 May 2023 12:08)  Lexapro 20 mg oral tablet: 1 tab(s) orally once a day (04 May 2023 12:08)  polyethylene glycol 3350 oral powder for reconstitution: 17 gram(s) orally once a day (at bedtime) as needed for  constipation (04 May 2023 18:09)  senna leaf extract oral tablet: 2 tab(s) orally once a day (at bedtime) as needed for  constipation (04 May 2023 18:09)  Trileptal 300 mg oral tablet: 1 tab(s) orally 2 times a day (04 May 2023 12:08)  Vitamin D3 1000 intl units (25 mcg) oral tablet: 1 tab(s) orally once a day (04 May 2023 12:08)      MEDICATIONS  (STANDING):  acetaminophen     Tablet .. 1000 milliGRAM(s) Oral every 8 hours  atorvastatin 80 milliGRAM(s) Oral at bedtime  celecoxib 200 milliGRAM(s) Oral every 12 hours  cholecalciferol 1000 Unit(s) Oral daily  enoxaparin Injectable 30 milliGRAM(s) SubCutaneous every 12 hours  escitalopram 20 milliGRAM(s) Oral daily  lactated ringers. 1000 milliLiter(s) (125 mL/Hr) IV Continuous <Continuous>  OXcarbazepine 300 milliGRAM(s) Oral two times a day  pantoprazole    Tablet 40 milliGRAM(s) Oral before breakfast  polyethylene glycol 3350 17 Gram(s) Oral at bedtime  senna 2 Tablet(s) Oral at bedtime    MEDICATIONS  (PRN):  aluminum hydroxide/magnesium hydroxide/simethicone Suspension 30 milliLiter(s) Oral four times a day PRN Indigestion  HYDROmorphone  Injectable 0.5 milliGRAM(s) IV Push every 3 hours PRN breakthrough pain  magnesium hydroxide Suspension 30 milliLiter(s) Oral daily PRN Constipation  ondansetron Injectable 4 milliGRAM(s) IV Push every 6 hours PRN Nausea and/or Vomiting  oxyCODONE    IR 5 milliGRAM(s) Oral every 3 hours PRN Moderate Pain (4 - 6)  oxyCODONE    IR 10 milliGRAM(s) Oral every 3 hours PRN Severe Pain (7 - 10)      REVIEW OF SYSTEMS:  CONSTITUTIONAL: No fever, weight loss, or fatigue  EYES: No eye pain,  or discharge  ENMT:  No sinus or throat pain  NECK: No pain or stiffness  BREASTS: No pain, masses, or nipple discharge  RESPIRATORY: No cough, wheezing, chills or hemoptysis; No shortness of breath  CARDIOVASCULAR: No chest pain, palpitations, dizziness, or leg swelling  GASTROINTESTINAL: No abdominal or epigastric pain. No nausea, vomiting.  GENITOURINARY: No dysuria, frequency, hematuria, or incontinence  NEUROLOGICAL: No headaches, memory loss, loss of strength, numbness, or tremors  SKIN: No itching, burning, rashes, or lesions   LYMPH NODES: No enlarged glands  ENDOCRINE: No polydipsia or polyuria  MUSCULOSKELETAL: No back pain  PSYCHIATRIC: No depression, anxiety, mood swings.   HEME/LYMPH: No easy bruising, or bleeding gums  ALLERGY AND IMMUNOLOGIC: No hives or eczema    Vital Signs Last 24 Hrs  T(C): 36.4 (05 May 2023 08:00), Max: 36.4 (04 May 2023 23:30)  T(F): 97.6 (05 May 2023 08:00), Max: 97.6 (05 May 2023 03:30)  HR: 70 (05 May 2023 08:00) (69 - 82)  BP: 94/59 (05 May 2023 08:00) (94/59 - 128/51)  BP(mean): --  RR: 16 (05 May 2023 08:00) (14 - 22)  SpO2: 98% (05 May 2023 08:00) (93% - 98%)    Parameters below as of 05 May 2023 08:00  Patient On (Oxygen Delivery Method): nasal cannula        PHYSICAL EXAM:  GENERAL: NAD, well-groomed, well-developed  HEAD:  Atraumatic, Normocephalic  EYES: EOMI, PERRLA, conjunctiva and sclera clear  ENMT: Moist mucous membranes,  No lesions  NECK: Supple,   CHEST/LUNG: Clear to auscultation bilaterally; No rales, rhonchi, wheezing, or rubs  HEART: Regular rate and rhythm; No murmurs, rubs, or gallops  ABDOMEN: Soft, Nontender, Nondistended; Bowel sounds present  EXTREMITIES:  2+ Peripheral Pulses, No clubbing or cyanosis  LYMPH: No lymphadenopathy noted  SKIN: No rashes or lesions  INCISION:  Dressing dry and intact  NERVOUS SYSTEM:  No focal deficit.   PSYCH: AAO X 3, good concentration.     LABS:                        9.1    9.16  )-----------( 183      ( 05 May 2023 06:00 )             28.0     05 May 2023 06:00    139    |  104    |  13     ----------------------------<  116    4.5     |  22     |  0.64     Ca    8.5        05 May 2023 06:00          CAPILLARY BLOOD GLUCOSE          RADIOLOGY & ADDITIONAL TESTS:    Imaging Personally Reviewed:  [ ] YES      Consultant(s) Notes Reviewed:     Care Discussed with Consultants/Other Providers:

## 2023-05-05 NOTE — OCCUPATIONAL THERAPY INITIAL EVALUATION ADULT - ADDITIONAL COMMENTS
Pt lives with spouse  pt reports he is unable to assist  4 MONALISA  flight +railing  tub with Hand Held shower on 1st floor. bedroom on 2nd floor

## 2023-05-05 NOTE — OCCUPATIONAL THERAPY INITIAL EVALUATION ADULT - LOWER BODY DRESSING, PREVIOUS LEVEL OF FUNCTION, OT EVAL
independent Methotrexate Counseling:  Patient counseled regarding adverse effects of methotrexate including but not limited to nausea, vomiting, abnormalities in liver function tests. Patients may develop mouth sores, rash, diarrhea, and abnormalities in blood counts. The patient understands that monitoring is required including LFT's and blood counts.  There is a rare possibility of scarring of the liver and lung problems that can occur when taking methotrexate. Persistent nausea, loss of appetite, pale stools, dark urine, cough, and shortness of breath should be reported immediately. Patient advised to discontinue methotrexate treatment at least three months before attempting to become pregnant.  I discussed the need for folate supplements while taking methotrexate.  These supplements can decrease side effects during methotrexate treatment. The patient verbalized understanding of the proper use and possible adverse effects of methotrexate.  All of the patient's questions and concerns were addressed.

## 2023-05-05 NOTE — PROGRESS NOTE ADULT - PROBLEM SELECTOR PLAN 2
Patient with anemia of acute postop blood loss.  Patient is asymptomatic.  Continue to monitor serial CBC and transfuse as needed.

## 2023-05-05 NOTE — SOCIAL WORK PROGRESS NOTE - NSSWPROGRESSNOTE_GEN_ALL_CORE
SW was referred to [facility] for discharge today and has been accepted.  Admitted for left TKR.  Pt will be discharged at 6pm today to [facility] via [facility] amb[facility].  Pt and family are in agreement with plan.  Son to call in credit card payment for transport.

## 2023-05-05 NOTE — DISCHARGE NOTE NURSING/CASE MANAGEMENT/SOCIAL WORK - NSDCPEFALRISK_GEN_ALL_CORE
For information on Fall & Injury Prevention, visit: [email or web address] OR  [email or web address] OR  https://www.cdc.gov/steadi/patient.html

## 2023-05-05 NOTE — CARE COORDINATION ASSESSMENT. - DOES THE PATIENT HAVE FINANCIAL RESOURCES TO MEET HEALTHCARE NEEDS SUCH AS PRESCRIPTIONS?
Outpatient Physical Therapy Ortho Treatment Note  [facility]     Patient Name: Wayne Ayala  : 1952  MRN: [id number]  Today's Date: 2021      Visit Date: 2021     Subjective Improvement: 20%  Attendance:   (medicare)  Next MD Visit : TBD  Recert Date:  2021      Therapy Diagnosis:  Back Pain w/ neuropathy and balance issues;         Visit Dx:    ICD-10-CM ICD-9-CM   1. Chronic back pain greater than 3 months duration  M54.9 724.5    G89.29 338.29       There is no problem list on file for this patient.       Past Medical History:   Diagnosis Date   • Arthritis    • Gout    • Hypertension         Past Surgical History:   Procedure Laterality Date   • COLONOSCOPY     • LUMBAR LAMINECTOMY Bilateral        PT Ortho     Row Name 21 0800       Subjective Comments    Subjective Comments  Patient reports that his mornings are better than the afternoons.   -       Precautions and Contraindications    Precautions/Limitations  lifting restrictions (specify in comments) No greater than 5#   -       Posture/Observations    Posture/Observations Comments  Ambulation with SPC  -      User Key  (r) = Recorded By, (t) = Taken By, (c) = Cosigned By    Initials Name Provider Type    Sonia Mccallum PTA Physical Therapy Assistant                      PT Assessment/Plan     Row Name 21 0800          PT Assessment    Assessment Comments  did well with treatment this date; Balance is still challengend secondary to neuropathy in B LE; Otherwise did well with new acitivtes; No new goals met at this time;   -        PT Plan    PT Frequency  2x/week  -     Predicted Duration of Therapy Intervention (PT)  3 weeks  -     PT Plan Comments  Continue to progress towards goals; Cybex Hip Abd and Add next  -       User Key  (r) = Recorded By, (t) = Taken By, (c) = Cosigned By    Initials Name Provider Type    Sonia Mccallum PTA Physical Therapy Assistant            OP Exercises  Yes "    Row Name 04/19/21 0800             Precautions    Existing Precautions/Restrictions  fall  -KH         Subjective Comments    Subjective Comments  Patient reports that his mornings are better than the afternoons.   -KH         Subjective Pain    Able to rate subjective pain?  yes  -KH      Pre-Treatment Pain Level  2  -KH      Post-Treatment Pain Level  2  -KH         Exercise 1    Exercise Name 1  Pro ll LE strength   -KH      Time 1  10 mins  -KH      Additional Comments  level 4; seat 9  -KH         Exercise 2    Exercise Name 2  Incline stretch  -KH      Cueing 2  Verbal  -KH      Sets 2  3  -KH      Time 2  30\" hold  -KH         Exercise 3    Exercise Name 3  standing B Hamstring stretch  -KH      Cueing 3  Verbal  -KH      Sets 3  3  -KH      Time 3  30\" holds each   -KH         Exercise 4    Exercise Name 4  Tandom Stance with & without finger hold  -KH      Sets 4  2x R/L lead each  -KH      Reps 4  1x done no hold, 1x with finger assist  -KH      Additional Comments  15: without finger, 30\" with finger  -KH         Exercise 5    Exercise Name 5  Step Ups Fwd & Lat  -KH      Cueing 5  Verbal  -KH      Sets 5  2  -KH      Reps 5  10 each   -KH      Additional Comments  6\" step   -KH         Exercise 6    Exercise Name 6  Cybex Leg Press  -KH      Cueing 6  Verbal  -KH      Sets 6  3  -KH      Reps 6  10  -KH      Additional Comments  80#  -KH         Exercise 7    Exercise Name 7  Cybex Calf Press  -KH      Sets 7  1  -KH      Reps 7  10  -KH      Additional Comments  80#  -KH        User Key  (r) = Recorded By, (t) = Taken By, (c) = Cosigned By    Initials Name Provider Type    Sonia Mccallum PTA Physical Therapy Assistant                       PT OP Goals     Row Name 04/19/21 0800          PT Short Term Goals    STG Date to Achieve  04/13/21  -KH     STG 1  Pt indepe with HEP for lumbar stabilization and lumbar mobility  -KH     STG 1 Progress  Ongoing  -KH     STG 2  Improve L ankle MMT to 2+/5  -KH  "    STG 2 Progress  Ongoing;Progressing  -     STG 3  Improve light touch sensation by 25-50% with B feet  -     STG 3 Progress  Ongoing;Progressing  -     STG 4  Able to ambulate fair erect (neutral lumbar ext) with RW on level surfaces  -     STG 4 Progress  Ongoing  -        Long Term Goals    LTG Date to Achieve  04/27/21  -     LTG 1  TBD  -        Time Calculation    PT Goal Re-Cert Due Date  04/20/21  -       User Key  (r) = Recorded By, (t) = Taken By, (c) = Cosigned By    Initials Name Provider Type    Sonia Mccallum PTA Physical Therapy Assistant                         Time Calculation:   Start Time: 0845  Stop Time: 0925  Time Calculation (min): 40 min  Total Timed Code Minutes- PT: 40 minute(s)  Therapy Charges for Today     Code Description Service Date Service Provider Modifiers Qty    [id number] HC PT THER PROC EA 15 MIN 4/19/2021 Sonia Squires PTA GP 3                    Sonia Squires PTA  4/19/2021

## 2023-05-05 NOTE — PATIENT CHOICE NOTE. - NSPTCHOICESTATE_GEN_ALL_CORE

## 2023-05-05 NOTE — CARE COORDINATION ASSESSMENT. - NSCAREPROVIDERS_GEN_ALL_CORE_FT
CARE PROVIDERS:  Administration: Denise Armenta  Administration: Roxie Nascimento  Administration: Debi Salazar  Administration: Anoop Saucedo  Admitting: Denzel Figueredo  Attending: Denzel Figueredo  Consultant: Sam Weaver  Consultant: Jennifer Soares  Consultant: Brunilda Bean  Nurse: Phillip López  Outpatient Provider: Uriah Delacruz  Override: Samira Terrell  Override: Jenn Arnett  PCA/Nursing Assistant: Bren Kong  PCA/Nursing Assistant: Ana Paiz  Physical Therapy: Earnestine Mirza  Primary Team: Sam Weaver  Primary Team: Osmany Howell  Primary Team: Micaela Boykin  : Jaret Arambula  : Khushbu Mcgregor// Supp. Assoc.: Margie Borges

## 2023-05-05 NOTE — PROGRESS NOTE ADULT - PROBLEM SELECTOR PLAN 1
Patient is s/p left/right total knee arthroplasty, post op day # 1.  Continue patient on Lovenox --> ASA for DVT prophylaxis.  Continue multimodal pain management with Tylenol/Celebrex/Oxycodone as needed and Dilaudid as needed.   Continue physical therapy.  Encourage incentive spirometry.  Monitor for post op anemia  and post op fever.   Plan is for discharge to Subacute rehabilitation.  Patient is medically cleared for discharge once OK with Ortho and PT.

## 2023-05-05 NOTE — DISCHARGE NOTE NURSING/CASE MANAGEMENT/SOCIAL WORK - NSDCPEPTCAREGIVEDUMATLIST _GEN_ALL_CORE
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 Did not complete on hormone suppression therapy. Patient has bilateral reconstruction with saline implants. Patient has no complaints and physical exam WNL. Will RTC in 6 months for reexamination. Advised patient that it is okay to follow up with medical oncology yearly or as needed.
Enoxaparin/Lovenox

## 2023-05-05 NOTE — PHARMACOTHERAPY INTERVENTION NOTE - COMMENTS
Transition of Care video discharge education - medication calendar given to patient
Admission medication reconciliation POD1

## 2023-05-15 ENCOUNTER — TRANSCRIPTION ENCOUNTER (OUTPATIENT)
Age: 83
End: 2023-05-15

## 2023-05-17 ENCOUNTER — APPOINTMENT (OUTPATIENT)
Dept: ORTHOPEDIC SURGERY | Facility: CLINIC | Age: 83
End: 2023-05-17
Payer: MEDICARE

## 2023-05-17 VITALS — WEIGHT: 153 LBS | BODY MASS INDEX: 28.89 KG/M2 | HEIGHT: 61 IN

## 2023-05-17 DIAGNOSIS — M25.469 EFFUSION, UNSPECIFIED KNEE: ICD-10-CM

## 2023-05-17 PROCEDURE — 73562 X-RAY EXAM OF KNEE 3: CPT | Mod: LT

## 2023-05-17 PROCEDURE — 99024 POSTOP FOLLOW-UP VISIT: CPT

## 2023-05-17 RX ORDER — OXYCODONE 5 MG/1
5 TABLET ORAL
Qty: 20 | Refills: 0 | Status: ACTIVE | COMMUNITY
Start: 2023-05-17 | End: 1900-01-01

## 2023-05-17 NOTE — HISTORY OF PRESENT ILLNESS
[de-identified] : 81 yo female s/p left total knee replacement on 5/4/2023  [de-identified] : 83 yo female s/p left total knee replacement on 5/4/2023. Patient reports an immediate onset following waking up from surgery of a chronic "burning" sensation all over here knee and goes down to the bottom of the calf . Diffused pain and burning diagnosed with "Infection" placed on oral antibiotics by rehab physician. Denies fever, chills, groin pain, thigh pain. Ambulates with a walker at this time. Patient reports a history of chronic back pain.\par  [de-identified] : Well-nourished no distress left leg ecchymosis left calf swelling left leg tender calf popliteal fossa capillary refill intact incision healed sutures intact flexion 5/90 ligaments intact [de-identified] : AP/Lateral/skyline views of the left knee obtained today 05/17/2023 demonstrates total knee replacement components in good alignment, no evidence of loosening, well centered patella.\par  [de-identified] : 83 yo female s/p left total knee replacement on 5/4/2023. Rule out DVT postoperative dysesthesia tourniquet not used therefore not likely to be from compression possible lumbar spinal stenosis possible anesthesia related from spinal injection\par  [de-identified] : Venous Doppler ultrasound

## 2023-05-17 NOTE — ADDENDUM
[FreeTextEntry1] : I, Lotus Dickey, acted solely as a scribe for Dr. Rex Figueredo MD on this date 05/17/2023 .\par \par All medical record entries made by the Scribe were at my, Dr. Rex Figueredo MD , direction and personally dictated by me on 05/17/2023 . I have reviewed the chart and agree that the record accurately reflects my personal performance of the history, physical exam, assessment and plan. I have also personally directed, reviewed, and agreed with the chart.\par \par

## 2023-05-23 RX ORDER — GABAPENTIN 100 MG/1
100 CAPSULE ORAL 3 TIMES DAILY
Qty: 60 | Refills: 1 | Status: ACTIVE | COMMUNITY
Start: 2023-05-23 | End: 1900-01-01

## 2023-05-24 ENCOUNTER — APPOINTMENT (OUTPATIENT)
Dept: ORTHOPEDIC SURGERY | Facility: CLINIC | Age: 83
End: 2023-05-24
Payer: MEDICARE

## 2023-05-24 PROCEDURE — 99024 POSTOP FOLLOW-UP VISIT: CPT

## 2023-05-24 NOTE — ADDENDUM
[FreeTextEntry1] : I, Lotus Dickey, acted solely as a scribe for Dr. Rex Figueredo MD on this date 05/24/2023 .\par \par All medical record entries made by the Scribe were at my, Dr. Rex Figueredo MD , direction and personally dictated by me on 05/24/2023 . I have reviewed the chart and agree that the record accurately reflects my personal performance of the history, physical exam, assessment and plan. I have also personally directed, reviewed, and agreed with the chart.\par

## 2023-05-24 NOTE — HISTORY OF PRESENT ILLNESS
[de-identified] : 83 yo female s/p left total knee replacement on 5/4/2023  [de-identified] : 83 yo female s/p left total knee replacement on 5/4/2023. Patient reports an immediate onset following waking up from surgery of a chronic "burning" sensation diffused left knee goes down to the bottom of the calf has not improved since last visit. Patient reports burning sensation bothers her throughout the night. Diffused pain and burning diagnosed with "Infection" placed on oral antibiotics by rehab physician. Denies fever, chills, groin pain, thigh pain. Ambulates with a walker at this time. Patient reports a history of chronic back pain. \par  [de-identified] : Well-nourished no distress left leg ecchymosis left calf swelling left leg tender calf popliteal fossa capillary refill intact incision healed sutures. intact flexion 5110 ligaments intact [de-identified] : AP/Lateral/skyline views of the left knee obtained  05/17/2023 demonstrates total knee replacement components in good alignment, no evidence of loosening, well centered patella. Recent venous Doppler ultrasound negative for DVT\par  [de-identified] : Venous Doppler ultrasound [de-identified] : 83 yo female s/p left total knee replacement on 5/4/2023.  possible lumbar spinal stenosis possible anesthesia related from spinal injection\par Plan gabapentin follow-up 1 week physical therapy\par

## 2023-06-08 ENCOUNTER — RX RENEWAL (OUTPATIENT)
Age: 83
End: 2023-06-08

## 2023-07-03 ENCOUNTER — NON-APPOINTMENT (OUTPATIENT)
Age: 83
End: 2023-07-03

## 2023-07-10 ENCOUNTER — RX RENEWAL (OUTPATIENT)
Age: 83
End: 2023-07-10

## 2023-07-19 ENCOUNTER — TRANSCRIPTION ENCOUNTER (OUTPATIENT)
Age: 83
End: 2023-07-19

## 2023-08-10 NOTE — DISCHARGE NOTE PROVIDER - NSDCADMDATE_GEN_ALL_CORE_FT
From: Jan Rebolledo  To: Dr. Russ Loge: 8/10/2023 1:28 PM EDT  Subject: Mammogram and Eye Exam    This is not a question. I just wanted to let you know my mammogram was done 1/26/23 at [facility] and my last eye appointment was on 4.20/23 at [facility] eye. 04-May-2023 10:17

## 2023-08-21 ENCOUNTER — RX RENEWAL (OUTPATIENT)
Age: 83
End: 2023-08-21

## 2023-08-22 ENCOUNTER — INPATIENT (INPATIENT)
Facility: HOSPITAL | Age: 83
LOS: 4 days | Discharge: HOME | DRG: 417 | End: 2023-08-27
Attending: INTERNAL MEDICINE | Admitting: INTERNAL MEDICINE
Payer: MEDICARE

## 2023-08-22 VITALS
TEMPERATURE: 98 F | WEIGHT: 147.93 LBS | HEIGHT: 61 IN | OXYGEN SATURATION: 96 % | SYSTOLIC BLOOD PRESSURE: 114 MMHG | DIASTOLIC BLOOD PRESSURE: 60 MMHG | RESPIRATION RATE: 18 BRPM | HEART RATE: 108 BPM

## 2023-08-22 DIAGNOSIS — Z96.652 PRESENCE OF LEFT ARTIFICIAL KNEE JOINT: Chronic | ICD-10-CM

## 2023-08-22 DIAGNOSIS — Z98.890 OTHER SPECIFIED POSTPROCEDURAL STATES: Chronic | ICD-10-CM

## 2023-08-22 DIAGNOSIS — Z98.49 CATARACT EXTRACTION STATUS, UNSPECIFIED EYE: Chronic | ICD-10-CM

## 2023-08-22 DIAGNOSIS — Z90.89 ACQUIRED ABSENCE OF OTHER ORGANS: Chronic | ICD-10-CM

## 2023-08-22 DIAGNOSIS — K85.90 ACUTE PANCREATITIS WITHOUT NECROSIS OR INFECTION, UNSPECIFIED: ICD-10-CM

## 2023-08-22 DIAGNOSIS — K81.9 CHOLECYSTITIS, UNSPECIFIED: ICD-10-CM

## 2023-08-22 DIAGNOSIS — F41.9 ANXIETY DISORDER, UNSPECIFIED: ICD-10-CM

## 2023-08-22 DIAGNOSIS — L03.116 CELLULITIS OF LEFT LOWER LIMB: ICD-10-CM

## 2023-08-22 DIAGNOSIS — Z96.651 PRESENCE OF RIGHT ARTIFICIAL KNEE JOINT: Chronic | ICD-10-CM

## 2023-08-22 DIAGNOSIS — Z29.9 ENCOUNTER FOR PROPHYLACTIC MEASURES, UNSPECIFIED: ICD-10-CM

## 2023-08-22 DIAGNOSIS — K85.10 BILIARY ACUTE PANCREATITIS WITHOUT NECROSIS OR INFECTION: ICD-10-CM

## 2023-08-22 DIAGNOSIS — R10.9 UNSPECIFIED ABDOMINAL PAIN: ICD-10-CM

## 2023-08-22 LAB
ALBUMIN SERPL ELPH-MCNC: 3.4 G/DL — SIGNIFICANT CHANGE UP (ref 3.3–5)
ALP SERPL-CCNC: 146 U/L — HIGH (ref 40–120)
ALT FLD-CCNC: 38 U/L — SIGNIFICANT CHANGE UP (ref 12–78)
ANION GAP SERPL CALC-SCNC: 8 MMOL/L — SIGNIFICANT CHANGE UP (ref 5–17)
AST SERPL-CCNC: 44 U/L — HIGH (ref 15–37)
BASOPHILS # BLD AUTO: 0.02 K/UL — SIGNIFICANT CHANGE UP (ref 0–0.2)
BASOPHILS NFR BLD AUTO: 0.2 % — SIGNIFICANT CHANGE UP (ref 0–2)
BILIRUB SERPL-MCNC: 0.4 MG/DL — SIGNIFICANT CHANGE UP (ref 0.2–1.2)
BUN SERPL-MCNC: 10 MG/DL — SIGNIFICANT CHANGE UP (ref 7–23)
CALCIUM SERPL-MCNC: 9.1 MG/DL — SIGNIFICANT CHANGE UP (ref 8.5–10.1)
CHLORIDE SERPL-SCNC: 105 MMOL/L — SIGNIFICANT CHANGE UP (ref 96–108)
CO2 SERPL-SCNC: 26 MMOL/L — SIGNIFICANT CHANGE UP (ref 22–31)
CREAT SERPL-MCNC: 0.59 MG/DL — SIGNIFICANT CHANGE UP (ref 0.5–1.3)
EGFR: 89 ML/MIN/1.73M2 — SIGNIFICANT CHANGE UP
EOSINOPHIL # BLD AUTO: 0.01 K/UL — SIGNIFICANT CHANGE UP (ref 0–0.5)
EOSINOPHIL NFR BLD AUTO: 0.1 % — SIGNIFICANT CHANGE UP (ref 0–6)
GLUCOSE SERPL-MCNC: 106 MG/DL — HIGH (ref 70–99)
HCT VFR BLD CALC: 35.4 % — SIGNIFICANT CHANGE UP (ref 34.5–45)
HGB BLD-MCNC: 11.3 G/DL — LOW (ref 11.5–15.5)
IMM GRANULOCYTES NFR BLD AUTO: 0.5 % — SIGNIFICANT CHANGE UP (ref 0–0.9)
LIDOCAIN IGE QN: 2483 U/L — HIGH (ref 73–393)
LYMPHOCYTES # BLD AUTO: 0.98 K/UL — LOW (ref 1–3.3)
LYMPHOCYTES # BLD AUTO: 8 % — LOW (ref 13–44)
MCHC RBC-ENTMCNC: 26.1 PG — LOW (ref 27–34)
MCHC RBC-ENTMCNC: 31.9 GM/DL — LOW (ref 32–36)
MCV RBC AUTO: 81.8 FL — SIGNIFICANT CHANGE UP (ref 80–100)
MONOCYTES # BLD AUTO: 1.03 K/UL — HIGH (ref 0–0.9)
MONOCYTES NFR BLD AUTO: 8.4 % — SIGNIFICANT CHANGE UP (ref 2–14)
NEUTROPHILS # BLD AUTO: 10.18 K/UL — HIGH (ref 1.8–7.4)
NEUTROPHILS NFR BLD AUTO: 82.8 % — HIGH (ref 43–77)
NRBC # BLD: 0 /100 WBCS — SIGNIFICANT CHANGE UP (ref 0–0)
PLATELET # BLD AUTO: 267 K/UL — SIGNIFICANT CHANGE UP (ref 150–400)
POTASSIUM SERPL-MCNC: 3.4 MMOL/L — LOW (ref 3.5–5.3)
POTASSIUM SERPL-SCNC: 3.4 MMOL/L — LOW (ref 3.5–5.3)
PROT SERPL-MCNC: 7.6 G/DL — SIGNIFICANT CHANGE UP (ref 6–8.3)
RBC # BLD: 4.33 M/UL — SIGNIFICANT CHANGE UP (ref 3.8–5.2)
RBC # FLD: 17.5 % — HIGH (ref 10.3–14.5)
SODIUM SERPL-SCNC: 139 MMOL/L — SIGNIFICANT CHANGE UP (ref 135–145)
WBC # BLD: 12.28 K/UL — HIGH (ref 3.8–10.5)
WBC # FLD AUTO: 12.28 K/UL — HIGH (ref 3.8–10.5)

## 2023-08-22 PROCEDURE — 71045 X-RAY EXAM CHEST 1 VIEW: CPT | Mod: 26

## 2023-08-22 PROCEDURE — 76705 ECHO EXAM OF ABDOMEN: CPT | Mod: 26

## 2023-08-22 PROCEDURE — 74177 CT ABD & PELVIS W/CONTRAST: CPT | Mod: 26,ME

## 2023-08-22 PROCEDURE — 99285 EMERGENCY DEPT VISIT HI MDM: CPT

## 2023-08-22 PROCEDURE — G1004: CPT

## 2023-08-22 RX ORDER — ACETAMINOPHEN 500 MG
1000 TABLET ORAL ONCE
Refills: 0 | Status: COMPLETED | OUTPATIENT
Start: 2023-08-22 | End: 2023-08-22

## 2023-08-22 RX ORDER — ESCITALOPRAM OXALATE 10 MG/1
20 TABLET, FILM COATED ORAL DAILY
Refills: 0 | Status: DISCONTINUED | OUTPATIENT
Start: 2023-08-22 | End: 2023-08-25

## 2023-08-22 RX ORDER — SODIUM CHLORIDE 9 MG/ML
1000 INJECTION INTRAMUSCULAR; INTRAVENOUS; SUBCUTANEOUS ONCE
Refills: 0 | Status: COMPLETED | OUTPATIENT
Start: 2023-08-22 | End: 2023-08-22

## 2023-08-22 RX ORDER — CEFTRIAXONE 500 MG/1
1000 INJECTION, POWDER, FOR SOLUTION INTRAMUSCULAR; INTRAVENOUS EVERY 24 HOURS
Refills: 0 | Status: DISCONTINUED | OUTPATIENT
Start: 2023-08-22 | End: 2023-08-22

## 2023-08-22 RX ORDER — ATORVASTATIN CALCIUM 80 MG/1
80 TABLET, FILM COATED ORAL AT BEDTIME
Refills: 0 | Status: DISCONTINUED | OUTPATIENT
Start: 2023-08-22 | End: 2023-08-25

## 2023-08-22 RX ORDER — SODIUM CHLORIDE 9 MG/ML
1000 INJECTION INTRAMUSCULAR; INTRAVENOUS; SUBCUTANEOUS
Refills: 0 | Status: DISCONTINUED | OUTPATIENT
Start: 2023-08-22 | End: 2023-08-23

## 2023-08-22 RX ORDER — ONDANSETRON 8 MG/1
4 TABLET, FILM COATED ORAL EVERY 8 HOURS
Refills: 0 | Status: DISCONTINUED | OUTPATIENT
Start: 2023-08-22 | End: 2023-08-25

## 2023-08-22 RX ORDER — SODIUM CHLORIDE 9 MG/ML
1000 INJECTION INTRAMUSCULAR; INTRAVENOUS; SUBCUTANEOUS
Refills: 0 | Status: DISCONTINUED | OUTPATIENT
Start: 2023-08-22 | End: 2023-08-22

## 2023-08-22 RX ORDER — LANOLIN ALCOHOL/MO/W.PET/CERES
3 CREAM (GRAM) TOPICAL AT BEDTIME
Refills: 0 | Status: DISCONTINUED | OUTPATIENT
Start: 2023-08-22 | End: 2023-08-25

## 2023-08-22 RX ORDER — ASPIRIN/CALCIUM CARB/MAGNESIUM 324 MG
81 TABLET ORAL DAILY
Refills: 0 | Status: DISCONTINUED | OUTPATIENT
Start: 2023-08-22 | End: 2023-08-25

## 2023-08-22 RX ORDER — POTASSIUM CHLORIDE 20 MEQ
40 PACKET (EA) ORAL ONCE
Refills: 0 | Status: COMPLETED | OUTPATIENT
Start: 2023-08-22 | End: 2023-08-22

## 2023-08-22 RX ORDER — ESCITALOPRAM OXALATE 10 MG/1
1 TABLET, FILM COATED ORAL
Qty: 0 | Refills: 0 | DISCHARGE

## 2023-08-22 RX ORDER — CHOLECALCIFEROL (VITAMIN D3) 125 MCG
1 CAPSULE ORAL
Qty: 0 | Refills: 0 | DISCHARGE

## 2023-08-22 RX ORDER — CHOLECALCIFEROL (VITAMIN D3) 125 MCG
1000 CAPSULE ORAL DAILY
Refills: 0 | Status: DISCONTINUED | OUTPATIENT
Start: 2023-08-22 | End: 2023-08-25

## 2023-08-22 RX ORDER — OXCARBAZEPINE 300 MG/1
300 TABLET, FILM COATED ORAL
Refills: 0 | Status: DISCONTINUED | OUTPATIENT
Start: 2023-08-22 | End: 2023-08-25

## 2023-08-22 RX ORDER — ENOXAPARIN SODIUM 100 MG/ML
40 INJECTION SUBCUTANEOUS EVERY 24 HOURS
Refills: 0 | Status: DISCONTINUED | OUTPATIENT
Start: 2023-08-22 | End: 2023-08-25

## 2023-08-22 RX ORDER — ASPIRIN/CALCIUM CARB/MAGNESIUM 324 MG
1 TABLET ORAL
Qty: 0 | Refills: 0 | DISCHARGE

## 2023-08-22 RX ORDER — ACETAMINOPHEN 500 MG
650 TABLET ORAL EVERY 6 HOURS
Refills: 0 | Status: DISCONTINUED | OUTPATIENT
Start: 2023-08-22 | End: 2023-08-25

## 2023-08-22 RX ORDER — PANTOPRAZOLE SODIUM 20 MG/1
40 TABLET, DELAYED RELEASE ORAL
Refills: 0 | Status: DISCONTINUED | OUTPATIENT
Start: 2023-08-22 | End: 2023-08-25

## 2023-08-22 RX ORDER — PIPERACILLIN AND TAZOBACTAM 4; .5 G/20ML; G/20ML
3.38 INJECTION, POWDER, LYOPHILIZED, FOR SOLUTION INTRAVENOUS EVERY 8 HOURS
Refills: 0 | Status: DISCONTINUED | OUTPATIENT
Start: 2023-08-22 | End: 2023-08-23

## 2023-08-22 RX ORDER — PIPERACILLIN AND TAZOBACTAM 4; .5 G/20ML; G/20ML
3.38 INJECTION, POWDER, LYOPHILIZED, FOR SOLUTION INTRAVENOUS ONCE
Refills: 0 | Status: COMPLETED | OUTPATIENT
Start: 2023-08-22 | End: 2023-08-22

## 2023-08-22 RX ADMIN — Medication 1000 MILLIGRAM(S): at 12:45

## 2023-08-22 RX ADMIN — SODIUM CHLORIDE 1000 MILLILITER(S): 9 INJECTION INTRAMUSCULAR; INTRAVENOUS; SUBCUTANEOUS at 11:00

## 2023-08-22 RX ADMIN — Medication 1000 MILLIGRAM(S): at 14:27

## 2023-08-22 RX ADMIN — ENOXAPARIN SODIUM 40 MILLIGRAM(S): 100 INJECTION SUBCUTANEOUS at 21:33

## 2023-08-22 RX ADMIN — ATORVASTATIN CALCIUM 80 MILLIGRAM(S): 80 TABLET, FILM COATED ORAL at 21:33

## 2023-08-22 RX ADMIN — Medication 40 MILLIEQUIVALENT(S): at 12:16

## 2023-08-22 RX ADMIN — PIPERACILLIN AND TAZOBACTAM 25 GRAM(S): 4; .5 INJECTION, POWDER, LYOPHILIZED, FOR SOLUTION INTRAVENOUS at 21:33

## 2023-08-22 RX ADMIN — Medication 400 MILLIGRAM(S): at 12:17

## 2023-08-22 RX ADMIN — SODIUM CHLORIDE 500 MILLILITER(S): 9 INJECTION INTRAMUSCULAR; INTRAVENOUS; SUBCUTANEOUS at 12:20

## 2023-08-22 RX ADMIN — SODIUM CHLORIDE 1000 MILLILITER(S): 9 INJECTION INTRAMUSCULAR; INTRAVENOUS; SUBCUTANEOUS at 15:20

## 2023-08-22 RX ADMIN — PIPERACILLIN AND TAZOBACTAM 200 GRAM(S): 4; .5 INJECTION, POWDER, LYOPHILIZED, FOR SOLUTION INTRAVENOUS at 16:30

## 2023-08-22 RX ADMIN — SODIUM CHLORIDE 1000 MILLILITER(S): 9 INJECTION INTRAMUSCULAR; INTRAVENOUS; SUBCUTANEOUS at 10:00

## 2023-08-22 RX ADMIN — SODIUM CHLORIDE 100 MILLILITER(S): 9 INJECTION INTRAMUSCULAR; INTRAVENOUS; SUBCUTANEOUS at 21:33

## 2023-08-22 RX ADMIN — OXCARBAZEPINE 300 MILLIGRAM(S): 300 TABLET, FILM COATED ORAL at 22:07

## 2023-08-22 NOTE — H&P ADULT - SKIN
left lower leg redness + itching ( possible cellulitis )/rash left lower leg redness + itching ( possible cellulitis )/warm and dry/erythema/scaling/rash

## 2023-08-22 NOTE — ED ADULT TRIAGE NOTE - CHIEF COMPLAINT QUOTE
pt to ED A&Ox4 with complaints of upper abdominal pain radiating to her back x 2 days with accompanied vomiting and loss of appetite.

## 2023-08-22 NOTE — H&P ADULT - MUSCULOSKELETAL
details… normal/ROM intact/normal gait/strength 5/5 bilateral upper extremities/strength 5/5 bilateral lower extremities normal/ROM intact/no joint swelling/no joint erythema/normal gait/strength 5/5 bilateral upper extremities/strength 5/5 bilateral lower extremities

## 2023-08-22 NOTE — H&P ADULT - NEUROLOGICAL
negative normal/cranial nerves II-XII intact/sensation intact AAo x 3/normal/cranial nerves II-XII intact/sensation intact/cranial nerves intact

## 2023-08-22 NOTE — H&P ADULT - ATTENDING COMMENTS
82 y/o F w/ PMHx of HLD, osteoarthritis, GERD, depression/anxiety, varicose veins ,Left  TKR  presents w/ sudden onset 10/10 upper abdominal pain since Sunday evening, pain radiation to bilateral lower back & bilateral shoulders. CT A/P : Mild, acute interstitial edematous pancreatitis in the pancreatic tail. CXR : Intrathoracic gastric fundal and body herniation ..RUQ U/S Echogenicity in the region of the gallbladder neck suggests small stones versus gravel. Gallbladder wall thickening identified measuring 7-8 mm. Trace pericholecystic fluid. Correlate for cholecystitis. Admitted for Gall stone  edematous pancreatitis & AC  Cholecystitis, Cholelithiasis  and Intrathoracic gastric fundal and body herniation  Pt seen, Examined, Case & care plan d/w pt, residents at detail.  AM labs   NPO, IVF   IV Abx   Consults:  GI- Dr Marques  ID-DR machuca -  Sx DR Dawn

## 2023-08-22 NOTE — PATIENT PROFILE ADULT - FUNCTIONAL ASSESSMENT - DAILY ACTIVITY 2.
Olvin Ahmadiantehrani   2017 8:45 AM   Non-Provider Visit   MRN: [id number]    Department:  [facility]   Dept Phone:  [phone]    Description:  Male : 1949   Provider:  FILIPPO ANGEL           Reason for Visit     Immunizations TDap and Zoster      Allergies as of 2017     No Known Allergies      You were diagnosed with     Need for Tdap vaccination   [[id number]]       Need for shingles vaccine   [[id number]]         Vital Signs     Smoking Status                   Never Smoker            Basic Information     Date Of Birth Sex Race Ethnicity Preferred Language    1949 Male Other Non- English      Your appointments     Mar 08, 2017  8:40 AM   Established Patient with Koko Calderon D.O.   [address]  [facility] [address] [phone]           You will be receiving a confirmation call a few days before your appointment from our automated call confirmation system.              Problem List              ICD-10-CM Priority Class Noted - Resolved    Dyslipidemia E78.5   3/24/2015 - Present    Cramps, extremity R25.2   3/24/2015 - Present    Diverticulosis of both small and large intestine K57.50   3/8/2016 - Present      Health Maintenance        Date Due Completion Dates    IMM DTaP/Tdap/Td Vaccine (1 - Tdap) 10/27/1968 ---    IMM ZOSTER VACCINE 10/27/2009 ---    COLONOSCOPY 3/24/2021 3/24/2011 (Done)    Override on 3/24/2011: Done ([facility].  2016 repeat)            Current Immunizations     13-VALENT PCV PREVNAR 2016  5:25 PM    Influenza Vaccine Adult HD 2016  5:23 PM    Pneumococcal polysaccharide vaccine (PPSV-23) 3/24/2015    SHINGLES VACCINE 2017  9:21 AM    Tdap Vaccine 2017  9:20 AM      Below and/or attached are the medications your provider expects you to take. Review all of your home medications and newly ordered medications with your provider and/or pharmacist. Follow medication  instructions as directed by your provider and/or pharmacist. Please keep your medication list with you and share with your provider. Update the information when medications are discontinued, doses are changed, or new medications (including over-the-counter products) are added; and carry medication information at all times in the event of emergency situations     Allergies:  No Known Allergies          Medications  Valid as of: January 17, 2017 - 10:01 AM    Generic Name Brand Name Tablet Size Instructions for use    Pravastatin Sodium (Tab) PRAVACHOL 40 MG Take 1 Tab by mouth every day.        Pravastatin Sodium (Tab) PRAVACHOL 20 MG Take 1 Tab by mouth every day.        .                 Medicines prescribed today were sent to:     [facility] [address] [place] [address], [place] - [address] [facility]    [address] [facility] [place] [address]    Phone: [phone] Fax: [phone]    Open 24 Hours?: No      Medication refill instructions:       If your prescription bottle indicates you have medication refills left, it is not necessary to call your provider’s office. Please contact your pharmacy and they will refill your medication.    If your prescription bottle indicates you do not have any refills left, you may request refills at any time through one of the following ways: The online [employer] system (except Urgent Care), by calling your provider’s office, or by asking your pharmacy to contact your provider’s office with a refill request. Medication refills are processed only during regular business hours and may not be available until the next business day. Your provider may request additional information or to have a follow-up visit with you prior to refilling your medication.   *Please Note: Medication refills are assigned a new Rx number when refilled electronically. Your pharmacy may indicate that no refills were authorized even though a new prescription for the same medication is  available at the pharmacy. Please request the medicine by name with the pharmacy before contacting your provider for a refill.           MyChart Access Code: Activation code not generated  Current MyChart Status: Active           4 = No assist / stand by assistance

## 2023-08-22 NOTE — H&P ADULT - ASSESSMENT
82 y/o F w/ PMHx of HLD, osteoarthritis, GERD, depression/anxiety, varicose veins, B/L TKR  presents w/ sudden onset 10/10 upper abdominal pain since Sunday evening, pain radiation to bilateral lower back & bilateral shoulders. CT A/P : Mild, acute interstitial edematous pancreatitis in the pancreatic tail. CXR : Intrathoracic gastric fundal and body herniation..RUQ U/S Echogenicity in the region of the gallbladder neck suggests small stones versus gravel. Gallbladder wall thickening identified measuring 7-8 mm. Trace pericholecystic fluid. Correlate for cholecystitis. Admitted for possible interstitial edematous pancreatitis Cholecystitis and Intrathoracic gastric fundal and body herniation 82 y/o F w/ PMHx of HLD, osteoarthritis, GERD, depression/anxiety, varicose veins ,Left  TKR  presents w/ sudden onset 10/10 upper abdominal pain since Sunday evening, pain radiation to bilateral lower back & bilateral shoulders. CT A/P : Mild, acute interstitial edematous pancreatitis in the pancreatic tail. CXR : Intrathoracic gastric fundal and body herniation ..RUQ U/S Echogenicity in the region of the gallbladder neck suggests small stones versus gravel. Gallbladder wall thickening identified measuring 7-8 mm. Trace pericholecystic fluid. Correlate for cholecystitis. Admitted for Gall stone  edematous pancreatitis & AC  Cholecystitis, Cholelithiasis  and Intrathoracic gastric fundal and body herniation

## 2023-08-22 NOTE — ED PROVIDER NOTE - OBJECTIVE STATEMENT
84yo F c/o right sided abdominal pain x 2 days assoc with nausea and vomiting. pt denies cp, sob, palpitations, bladder/bowel changes, ha, trauma, travel. pt s/p left TKR 3 months ago. no fevers or chills.  Dr michael pierre called into ED to request surgical consult by Dr. Dawn  PSH:   PCP= kelton tapia

## 2023-08-22 NOTE — H&P ADULT - HISTORY OF PRESENT ILLNESS
84 y/o F w/ PMHx of HLD, osteoarthritis, GERD, depression/anxiety, varicose veins, B/L TKR  presents w/ sudden onset 10/10 upper abdominal pain since Sunday evening, pain radiation to bilateral lower back & bilateral shoulders. States  the pain was accompanied w/ nausea and several episodes of emesis that same night. She has had not appetite since then and has not eaten since Sunday night. Admits to chills, weakness, fatigue, nausea, abd bloating and several episodes of diarrhea since arrival to the ED. Denies headaches, chest pain, palpitations, shortness of breath, acid reflux, vomiting or urinary sx. Reports no past hx of pancreatitis. No hx of cancer.   Last colonoscopy was around 5 years ago, family hx of Crohn's - sister and granddaughter. Denies any recent alcohol use, drug abuse.     IN ED,  VITALS /69 HR 94 RR 20 Temp 98F on RA   PERTINENT LABS: Wbc 12k 82% predominant neutrophils, hb 11.3 RDW index 17.5   K 3.4 Glucose 106 AST 44 ALK po4 146  Lipase 2483  CT A/P : Mild, acute interstitial edematous pancreatitis in the pancreatic tail.  No discrete fluid collections. Edematous gallbladder wall and increased enhancement. The finding may be   secondary the presence of inflammation in the pancreas and  versus acute  cholecystitis. Recommend right upper quadrant abdominal ultrasound for  further evaluation and correlation.  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    Focus of air in the bladder. Correlate with instrumentation versus   infection. Correlate with urinalysis.    CXR : Intrathoracic gastric fundal and body herniation. Please see lower chest images of abdominal CT scan 8/22/2023. No airspace consolidation.    RUQ U/S Echogenicity in the region of the gallbladder neck suggests small stones versus gravel. Gallbladder wall thickening identified measuring 7-8 mm. Trace pericholecystic fluid. Correlate for cholecystitis.  s/ p  1X iv Tylenol zosyn, potassium 40mg X1,  2L NS in ER   EKG shows NSR 87 bpm QTc 462

## 2023-08-22 NOTE — H&P ADULT - ADDITIONAL PE
SCAR _left Knee , Mild erythema Left Lower leg on anterior shin , Warm_ Mild soreness . No Open skin/wound

## 2023-08-22 NOTE — PATIENT PROFILE ADULT - FALL HARM RISK - RISK INTERVENTIONS

## 2023-08-22 NOTE — H&P ADULT - PROBLEM SELECTOR PLAN 2
- On admission patient with abdominal pain, n/v,+ lipase 3x the upper limit of normal 2483 likely early setting of acute pancreatitis.  - AST 44 ALK po4 146    - CT A/P : Mild, acute interstitial edematous pancreatitis in the pancreatic tail. .  - RUQ U/S Echogenicity in the region of the gallbladder neck suggests small stones versus gravel. Gallbladder wall thickening identified measuring 7-8 mm. Trace pericholecystic fluid. Correlate for cholecystitis.  - IVF NS @100 cc/h  -  Tylenol 650 mg q4h po prn for mild pain | hydromorphone 0.5 mg q6h IV prn for moderate pain | hydromorphone 1 mg q4h IV prn for severe pain  - Zofran 4mg IV q8h prn for n/v  - EKG QTc 462 check EKG every 48 hours for prolongation  - f/up lipid panel   - monitor for s/s of infection and trend VS and WBC in AM CBC  - monitor for s/s of worsening of pancreatitis and trend lipase and AM CMP  - GI consulted, will appreciate recs. - RUQ U/S Echogenicity in the region of the gallbladder neck suggests small stones versus gravel. Gallbladder wall thickening identified measuring 7-8 mm. Trace pericholecystic fluid. Correlate for cholecystitis.  -IV Zosyn q 8 hrs   Sx-Dr xavier on case  GI-Dr Marques on case  Pain meds  NPO, IVF

## 2023-08-22 NOTE — H&P ADULT - PROBLEM SELECTOR PLAN 4
- RUQ U/S Echogenicity in the region of the gallbladder neck suggests small stones versus gravel. Gallbladder wall thickening identified measuring 7-8 mm. Trace pericholecystic fluid. Correlate for cholecystitis.    - management as above - Patient presents with mild erythema LL Ext ,+ itching and tender to touch  likely 2/2 LLE cellulitis  - Given 1 x dose IV zosyn , 2L NS Bolus in the ED  - Continue IV Zosyn q8hrs  - Tylenol 650 mg PO q6h PRN for mild pain or fever  - f/u Bcx and Ucx, MRSA PCR   - Monitor WBC   - ID Dr. Lemus consulted, reccs willie

## 2023-08-22 NOTE — H&P ADULT - NSHPSOCIALHISTORY_GEN_ALL_CORE
Tobacco:  denies  EtOH: denies   Recreational drug use: denies   Lives with: family   Ambulates: with walker

## 2023-08-22 NOTE — PATIENT PROFILE ADULT - NSPROPASSIVESMOKEEXPOSURE_GEN_A_NUR
Arterial Doppler





LOWER EXTREMITY ARTERIAL DOPPLER:





DATE OF SERVICE: 06/01/2021





Reason for study: Left foot ulcer.





Doppler waveforms: Multiphasic bilaterally throughout.





Pulse volume recording: [].





Pressure gradients: None.





Ankle-brachial indices: Greater than 1.





Toe brachial indices: 0.87 on the right, 0.84 on the left





Impression: Normal study. No

## 2023-08-22 NOTE — H&P ADULT - NSICDXPASTSURGICALHX_GEN_ALL_CORE_FT
PAST SURGICAL HISTORY:  S/P cataract surgery bilateral    S/P foot surgery, right right great toe surgery    S/P tonsillectomy     S/P total knee replacement, right     S/P wrist surgery right     PAST SURGICAL HISTORY:  S/P cataract surgery bilateral    S/P foot surgery, right right great toe surgery    S/P tonsillectomy     S/P total knee replacement, left     S/P wrist surgery right

## 2023-08-22 NOTE — ED PROVIDER NOTE - CLINICAL SUMMARY MEDICAL DECISION MAKING FREE TEXT BOX
82yo F with RLQ pain x 2 days associated with nausea and vomiting. labs, IVFs, CT A/P. appendicitis, cholecystitis 82yo F with RLQ pain x 2 days associated with nausea and vomiting. labs, IVFs, lipase, CT A/P. appendicitis, cholecystitis, pancreatitis, gallstone pancreatitis

## 2023-08-22 NOTE — ED ADULT NURSE NOTE - OBJECTIVE STATEMENT
Patient alert and oriented from home.  Patient with complaints of left upper abdominal pain that started two days ago with vomiting and loss of appetite

## 2023-08-22 NOTE — H&P ADULT - CARDIOVASCULAR
details… normal/regular rate and rhythm/S1 S2 present/no gallops/no rub/no murmur normal/regular rate and rhythm/S1 S2 present/no gallops/no rub/no murmur/no JVD/no pedal edema

## 2023-08-22 NOTE — ED PROVIDER NOTE - PHYSICAL EXAMINATION
Gen: Awake, Alert, WD, WN, NAD  Head:  NC/AT  Eyes:  PERRL, EOMI, Conjunctiva pink, lids normal, no scleral icterus  ENT: OP clear, moist mucus membranes  Neck: supple, nontender, trachea midline  Cardiac/CV:  S1 S2, RRR, no M/G/R  Chest: nontender, no crepitus  Respiratory/Pulm:  CTAB, good air movement, normal resp effort, no wheezes/stridor/retractions/rales/rhonchi  Gastrointestinal/Abdomen:  Soft, +RLQ tender, +rebound RLQ, nondistended, +BS, no guarding, no pulsatile mass, femoral pulses 2+  Pelvis: stable, nontender  Back:  no CVAT, no MLT  Ext:  warm, well perfused, moving all extremities spontaneously, no cyanosis, no erythema, no edema, distal pulses intact  Skin: intact, no rash, no vesicles, no petechiae, no ecchymosis  Neuro:  AAOx3, sensation intact, motor 5/5 x 4 extremities, speech clear Gen: Awake, Alert, WD, WN, NAD  Head:  NC/AT  Eyes:  PERRL, EOMI, Conjunctiva pink, lids normal, no scleral icterus  ENT: OP clear, moist mucus membranes  Neck: supple, nontender, trachea midline  Cardiac/CV:  S1 S2, RRR, no M/G/R  Chest: nontender, no crepitus  Respiratory/Pulm:  CTAB, good air movement, normal resp effort, no wheezes/stridor/retractions/rales/rhonchi  Gastrointestinal/Abdomen:  Soft, +RLQ tender, +rebound RLQ, nondistended, +BS, no guarding, no pulsatile mass, femoral pulses 2+  Pelvis: stable, nontender  Back:  no CVAT, no MLT  Ext:  warm, well perfused, moving all extremities spontaneously, +anterior surgical scar left knee s/p TKR, no cyanosis, no erythema, no edema, distal pulses intact  Skin: intact, no rash, no vesicles, no petechiae, no ecchymosis  Neuro:  AAOx3, sensation intact, motor 5/5 x 4 extremities, speech clear

## 2023-08-22 NOTE — H&P ADULT - PROBLEM SELECTOR PLAN 3
- Patient presents with mild erythema,+ itching and tender to touch  likely 2/2 LLE cellulitis  - does not meet sepsis criteria  - Admit to [facility]  - Given 1 x dose IV zosyn , 2L NS Bolus in the ED  - Continue IV Zosyn q8hrs  - Tylenol 650 mg PO q6h PRN for mild pain or fever  - f/u Bcx and Ucx, MRSA PCR   - Monitor WBC   - ID Dr. Lemus consulted, reccs willie - Upper  abdominal pain at epigastric area radiates R and L upper abdomen, radiates to shoulders and lower back, now much better + nausea, vomiting resolved + loose stools x4 in ER   - CT A/P : Mild, acute interstitial edematous pancreatitis in the pancreatic tail. .  - CXR : Intrathoracic gastric fundal and body herniation.   - RUQ U/S Echogenicity in the region of the gallbladder neck suggests small stones versus gravel. Gallbladder wall thickening identified measuring 7-8 mm. Trace pericholecystic fluid. Correlate for cholecystitis.  - Mahmood's sign NEGATIVE   - s/ p  1x 1v Tylenol, zosyn, potassium 40mg X1,  2L NS in ER   - MRCP ordered   - NPO Except meds, IVF  -  Surgery consulted Dr. Dawn, will wait till resolution of pancreatitis, then plan for OR for lap virgil pending clinical course  - Gi Dr hicks consulted

## 2023-08-22 NOTE — H&P ADULT - PROBLEM SELECTOR PLAN 1
- Upper  abdominal pain at epigastric area radiates R and L upper abdomen, radiates to shoulders and lower back, now much better + nausea, vomiting resolved + loose stools x4 in ER   - CT A/P : Mild, acute interstitial edematous pancreatitis in the pancreatic tail. .  - CXR : Intrathoracic gastric fundal and body herniation.   - RUQ U/S Echogenicity in the region of the gallbladder neck suggests small stones versus gravel. Gallbladder wall thickening identified measuring 7-8 mm. Trace pericholecystic fluid. Correlate for cholecystitis.  - Mahmood's sign NEGATIVE   - s/ p  1x 1v Tylenol, zosyn, potassium 40mg X1,  2L NS in ER   - MRCP ordered   - NPO Except meds, IVF  -  Surgery consulted Dr. Dawn, will wait till resolution of pancreatitis, then plan for OR for lap virgil pending clinical course  - Gi Dr hicks consulted - On admission patient with abdominal pain, n/v,/ d + lipase 3x the upper limit of normal 2483 amberly setting of acute pancreatitis.-Likely Gall stone Pancreatitis   - AST 44 ALK po4 146    - CT A/P : Mild, acute interstitial edematous pancreatitis in the pancreatic tail. .  - RUQ U/S Echogenicity in the region of the gallbladder neck suggests small stones versus gravel. Gallbladder wall thickening identified measuring 7-8 mm. Trace pericholecystic fluid. Correlate for cholecystitis.  - IVF NS @100 cc/h  -  Tylenol 650 mg q4h po prn for mild pain | hydromorphone 0.5 mg q6h IV prn for moderate pain | hydromorphone 1 mg q4h IV prn for severe pain  - Zofran 4mg IV q8h prn for n/v  - EKG QTc 462 check EKG every 48 hours for prolongation  - f/up lipid panel   - monitor for s/s of infection and trend VS and WBC in AM CBC  - monitor for s/s of worsening of pancreatitis and trend lipase and AM CMP,Lipase   - GI consulted, will appreciate recs.-NPO ,IVF

## 2023-08-22 NOTE — H&P ADULT - REASON FOR ADMISSION
cholecystitis, acute interstitial edematous pancreatitis.  Intrathoracic gastric fundal and body herniation

## 2023-08-22 NOTE — ED ADULT NURSE NOTE - NSFALLRISKASMTTYPE_ED_ALL_ED
Initial (On Arrival) Partial Purse String (Intermediate) Text: Given the location of the defect and the characteristics of the surrounding skin an intermediate purse string closure was deemed most appropriate.  Undermining was performed circumferentially around the surgical defect.  A purse string suture was then placed and tightened. Wound tension of the circular defect prevented complete closure of the wound.

## 2023-08-22 NOTE — H&P ADULT - GASTROINTESTINAL
details… soft/tender/distended soft/nondistended/no guarding/no rigidity/no organomegaly/no palpable lis/no masses palpable/tender/distended

## 2023-08-22 NOTE — CONSULT NOTE ADULT - SUBJECTIVE AND OBJECTIVE BOX
Surgery Consult Note:    CC: Patient is a 83y old  Female who presents with a chief complaint of upper abd pain.    Interval HPI: 82 y/o female w/ PMHx of HLD, osteoarthritis, GERD, depression/anxiety, varicose veins presents w/ sudden onset 10/10 upper abdominal pain since  evening, with radiation to bilateral lower back & bilateral shoulders. She states the pain was accompanied w/ nausea and several episodes of emesis that same night. She has had not appetite since then and has not eaten since  night. Admits to chills, weakness, fatigue, nausea, abd bloating and several episodes of diarrhea since arrival to the ED. Denies headaches, chest pain, palpitations, shortness of breath, acid reflux, vomiting or urinary sx.  Last colonoscopy was around 5 years ago, pt was told she does not have to return for another, was not notified of any abnormal findings.  Reports a family hx of Crohn's - sister and granddaughter.    Past Medical & Surgical History:  Anxiety      Depression      Hyperlipidemia      GERD (gastroesophageal reflux disease)      History of vascular disease  venous; has swelling      Osteoarthritis of left knee      HLD (hyperlipidemia)      S/P foot surgery, right  right great toe surgery      S/P wrist surgery  right      S/P tonsillectomy      S/P cataract surgery  bilateral      S/P total knee replacement, right      ROS:  As reviewed in HPI, remaining ROS negative    Medications:  Home Medications:  acetaminophen 500 mg oral tablet: 2 tab(s) orally every 8 hours (04 May 2023 18:09)  celecoxib 200 mg oral capsule: 1 cap(s) orally every 12 hours (05 May 2023 13:24)  Crestor 40 mg oral tablet: 1 tab(s) orally once a day (at bedtime) (04 May 2023 12:08)  Ecotrin Adult Low Strength 81 mg oral delayed release tablet: 1 tab(s) orally every 12 hours (05 May 2023 13:35)  enoxaparin: 30 milligram(s) subcutaneous every 12 hours (05 May 2023 13:35)  Lexapro 20 mg oral tablet: 1 tab(s) orally once a day (04 May 2023 12:08)  oxyCODONE 10 mg oral tablet: 1 tab(s) orally every 4 hours as needed for Severe Pain (7 - 10) (05 May 2023 13:24)  oxyCODONE 5 mg oral tablet: 1 tab(s) orally every 4 hours as needed for Moderate Pain (4 - 6) (05 May 2023 13:24)  pantoprazole 40 mg oral delayed release tablet: 1 tab(s) orally once a day (before a meal) (05 May 2023 13:24)  polyethylene glycol 3350 oral powder for reconstitution: 17 gram(s) orally once a day (at bedtime) as needed for  constipation (04 May 2023 18:09)  senna leaf extract oral tablet: 2 tab(s) orally once a day (at bedtime) as needed for  constipation (04 May 2023 18:09)  Trileptal 300 mg oral tablet: 1 tab(s) orally 2 times a day (04 May 2023 12:08)  Vitamin D3 1000 intl units (25 mcg) oral tablet: 1 tab(s) orally once a day (04 May 2023 12:08)    MEDICATIONS  (STANDING):  piperacillin/tazobactam IVPB... 3.375 Gram(s) IV Intermittent once    MEDICATIONS  (PRN):      Allergies:  ALLERGIES: adhesives (Pruritus; Rash)  No Known Drug Allergies    INTOLERANCES: None, unless indicated below  lactose (Diarrhea)    Social History:  Smoking: Yes [ ]  No [x]   ______ pack years  EtOH: Yes [ ]  No [x]  Social [ ]  Drugs: Yes [ ]  No [x]  _______    Family History:  Family history of vascular disease  sister   Family history- stomach cancer  father     Vitals:  Vital Signs Last 24 Hrs  T(C): 36.7 (22 Aug 2023 16:36), Max: 36.7 (22 Aug 2023 16:36)  T(F): 98 (22 Aug 2023 16:36), Max: 98 (22 Aug 2023 16:36)  HR: 90 (22 Aug 2023 16:36) (90 - 108)  BP: 111/72 (22 Aug 2023 16:36) (111/72 - 114/60)  BP(mean): --  RR: 18 (22 Aug 2023 16:36) (18 - 18)  SpO2: 95% (22 Aug 2023 16:36) (95% - 96%)    Parameters below as of 22 Aug 2023 16:36  Patient On (Oxygen Delivery Method): room air    Physical Exam:  General: no acute distress, appears comfortable, well nourished, well-groomed  HEENT: normocephalic/atraumatic, vision grossly intact, hearing grossly intact, no nasal discharge, ears & nose symmetrical and atraumatic  Neck: supple  Chest: lungs clear to auscultation bilaterally, good inspiratory effort  Heart: heart rate and rhythm regular  Abdomen: soft, nondistended, generalized tenderness to palpation RUQ/RLQ; no guarding, rebound tenderness, no freire's sign  Extremities: no gross deformities, able to move all four extremities, no edema  Neuro: alert & oriented x3, motor and sensory grossly intact  Skin: warm, dry, good turgor; no gross lesions, no eruptions, rashes or jaundice    Labs:                        11.3   12.28 )-----------( 267      ( 22 Aug 2023 09:58 )             35.4         139  |  105  |  10  ----------------------------<  106<H>  3.4<L>   |  26  |  0.59    Ca    9.1      22 Aug 2023 09:58    TPro  7.6  /  Alb  3.4  /  TBili  0.4  /  DBili  x   /  AST  44<H>  /  ALT  38  /  AlkPhos  146<H>    Urinalysis Basic - ( 22 Aug 2023 09:58 )    Color: x / Appearance: x / SG: x / pH: x  Gluc: 106 mg/dL / Ketone: x  / Bili: x / Urobili: x   Blood: x / Protein: x / Nitrite: x   Leuk Esterase: x / RBC: x / WBC x   Sq Epi: x / Non Sq Epi: x / Bacteria: x    LIVER FUNCTIONS - ( 22 Aug 2023 09:58 )  Alb: 3.4 g/dL / Pro: 7.6 g/dL / ALK PHOS: 146 U/L / ALT: 38 U/L / AST: 44 U/L / GGT: x           Radiology & Additional Studies:  < from: CT Abdomen and Pelvis w/ IV Cont (23 @ 12:00) >    ACC: [id number] EXAM:  CT ABDOMEN AND PELVIS IC   ORDERED BY: LIUDMILA DALTON     PROCEDURE DATE:  2023    INTERPRETATION:  CLINICAL INFORMATION: Right lower quadrant abdominal pain    COMPARISON: None.    CONTRAST/COMPLICATIONS:  IV Contrast: Omnipaque 350  90 cc administered   10 cc discarded  Oral Contrast: NONE  Complications: None reported at time of study completion    PROCEDURE:  CT of the Abdomen and Pelvis was performed.  Sagittal and coronal reformats were performed.    FINDINGS:  LOWER CHEST: Bibasilar atelectasis.    LIVER: Fatty changes in segment 4 of the liver.  BILE DUCTS: Normal caliber.  GALLBLADDER: The gallbladder is mildly distended and measures 9.0 x 4.0 x   5.1 cm. there is edema in the gallbladder wall and mildly increased   enhancement. No pericholecystic stranding is seen.  SPLEEN: Within normal limits.  PANCREAS: The pancreas enhances homogeneously. There is mild edema within   and surrounding the pancreatic tail parenchyma. No discrete fluid   collections. No pancreatic duct dilatation.  ADRENALS: Within normal limits.  KIDNEYS/URETERS: Multiple subcentimeter hypodensities bilaterally which   are not fully characterized. Parapelvic cysts are noted. No   hydronephrosis. No nephroureterolithiasis.    BLADDER: There is a tiny focus of air in the lumen of the urinary bladder.  REPRODUCTIVE ORGANS: Uterus and adnexa within normal limits.    BOWEL: Moderate sized hiatal hernia. No obstruction. No bowel   obstruction. Appendix is normal. Scattered colonic diverticula are not   acutely inflamed.  PERITONEUM: No ascites. No pneumoperitoneum. No loculated fluid   collections.  VESSELS: The abdominal aorta has normal caliber with moderate   atherosclerotic changes. 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 SMA patent, MARK   diminutive and patent. The portal vein, splenic vein, and superior   mesenteric vein are patent. A 1.1 cm, almost circumferentially calcified   splenic artery aneurysm is noted in the splenic hilum.  RETROPERITONEUM/LYMPH NODES: No lymphadenopathy.  ABDOMINAL WALL: Small fat-containing umbilical hernia.  BONES: S-shaped scoliosis of the thoracolumbar spine. Discogenic   degenerative disease. Sclerotic focus in L4, likely benign.    IMPRESSION:  Mild, acute interstitial edematous pancreatitis in the pancreatic tail.   No discrete fluid collections.    Edematous gallbladder wall and increased enhancement. The finding may be   secondary the presence of inflammation in the pancreas and  versus acute   cholecystitis. Recommend right upper quadrant abdominal ultrasound for   further evaluation and correlation.    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    Focus of air in the bladder. Correlate with instrumentation versus   infection. Correlate with urinalysis.    --- End of Report ---         EDITH DIAZ MD; Resident Radiologist  This document has been electronically signed.  MARCELLO HAWK MD; Attending Radiologist  This document has been electronically signed. Aug 22 2023  3:51PM    < end of copied text >    < from: US Abdomen Upper Quadrant Right (23 @ 15:15) >    ACC: [id number] EXAM:  US ABDOMEN RT UPR QUADRANT   ORDERED BY: IZABELA FORD     PROCEDURE DATE:  2023    INTERPRETATION:  CLINICAL INFORMATION: Assess for gallstones/pancreatitis.    COMPARISON: None available.    TECHNIQUE: Sonography of the right upper quadrant. Evaluation limited   secondary to increased bowel gas.    FINDINGS:  Liver: Within normal limits.  Bile ducts: Normal caliber. Common bile duct measures 7 mm.  Gallbladder: Echogenicity in the region of the gallbladder neck suggests   small stones versus gravel. Gallbladder wall thickening identified   measuring 7-8 mm. Trace pericholecystic fluid.  Pancreas: Visualized portions are within normal limits.  Right kidney: 9.1 cm. No hydronephrosis. 1.2 cm anechoic region right   renal hilus, likely parapelvic cyst.  Ascites: None.  IVC: Visualized portions are within normal limits.    IMPRESSION:  Echogenicity in the region of the gallbladder neck suggests small stones   versus gravel. Gallbladder wall thickeningidentified measuring 7-8 mm.   Trace pericholecystic fluid. Correlate for cholecystitis.    --- End of Report ---    STEFFANIE SMITH MD; Attending Radiologist  This document has been electronically signed. Aug 22 2023  3:36PM    < end of copied text >

## 2023-08-22 NOTE — CONSULT NOTE ADULT - ASSESSMENT
84 y/o female w/ PMHx of HLD, osteoarthritis, GERD, depression/anxiety, varicose veins presents w/ abdominal pain. Mild leukocytosis, elevated lipase. Imaging revealing edematous pancreas & gallbladder w/ wall thickening.    PLAN:  - Medicine admit  - GI consult  - NPO, IVF  - F/u AM labs  - Discussed w/ Dr. Dawn 82 y/o female w/ PMHx of HLD, osteoarthritis, GERD, depression/anxiety, varicose veins presents w/ abdominal pain. Mild leukocytosis, elevated lipase. Imaging revealing pancreatitis, an edematous gallbladder w/ wall thickening. cholelithiasis; suspecting gallstone pancreatitis; however may need mrcp to further evaluate.    PLAN:  - Medicine admit  - GI consult- ?mrcp  - NPO, IVF  - F/u AM labs  - Discussed w/ Dr. Dawn

## 2023-08-23 ENCOUNTER — TRANSCRIPTION ENCOUNTER (OUTPATIENT)
Age: 83
End: 2023-08-23

## 2023-08-23 DIAGNOSIS — R93.89 ABNORMAL FINDINGS ON DIAGNOSTIC IMAGING OF OTHER SPECIFIED BODY STRUCTURES: ICD-10-CM

## 2023-08-23 LAB
ALBUMIN SERPL ELPH-MCNC: 2.7 G/DL — LOW (ref 3.3–5)
ALP SERPL-CCNC: 126 U/L — HIGH (ref 40–120)
ALT FLD-CCNC: 54 U/L — SIGNIFICANT CHANGE UP (ref 12–78)
AMYLASE P1 CFR SERPL: 128 U/L — HIGH (ref 25–125)
ANION GAP SERPL CALC-SCNC: 7 MMOL/L — SIGNIFICANT CHANGE UP (ref 5–17)
APPEARANCE UR: ABNORMAL
AST SERPL-CCNC: 59 U/L — HIGH (ref 15–37)
BASOPHILS # BLD AUTO: 0.03 K/UL — SIGNIFICANT CHANGE UP (ref 0–0.2)
BASOPHILS NFR BLD AUTO: 0.3 % — SIGNIFICANT CHANGE UP (ref 0–2)
BILIRUB SERPL-MCNC: 0.5 MG/DL — SIGNIFICANT CHANGE UP (ref 0.2–1.2)
BILIRUB UR-MCNC: NEGATIVE — SIGNIFICANT CHANGE UP
BUN SERPL-MCNC: 9 MG/DL — SIGNIFICANT CHANGE UP (ref 7–23)
CALCIUM SERPL-MCNC: 8.2 MG/DL — LOW (ref 8.5–10.1)
CHLORIDE SERPL-SCNC: 109 MMOL/L — HIGH (ref 96–108)
CO2 SERPL-SCNC: 23 MMOL/L — SIGNIFICANT CHANGE UP (ref 22–31)
COLOR SPEC: YELLOW — SIGNIFICANT CHANGE UP
CREAT SERPL-MCNC: 0.45 MG/DL — LOW (ref 0.5–1.3)
DIFF PNL FLD: ABNORMAL
EGFR: 95 ML/MIN/1.73M2 — SIGNIFICANT CHANGE UP
EOSINOPHIL # BLD AUTO: 0.06 K/UL — SIGNIFICANT CHANGE UP (ref 0–0.5)
EOSINOPHIL NFR BLD AUTO: 0.5 % — SIGNIFICANT CHANGE UP (ref 0–6)
GLUCOSE SERPL-MCNC: 84 MG/DL — SIGNIFICANT CHANGE UP (ref 70–99)
GLUCOSE UR QL: NEGATIVE — SIGNIFICANT CHANGE UP
HCT VFR BLD CALC: 28.9 % — LOW (ref 34.5–45)
HGB BLD-MCNC: 9.3 G/DL — LOW (ref 11.5–15.5)
IMM GRANULOCYTES NFR BLD AUTO: 0.4 % — SIGNIFICANT CHANGE UP (ref 0–0.9)
KETONES UR-MCNC: 15
LEUKOCYTE ESTERASE UR-ACNC: ABNORMAL
LIDOCAIN IGE QN: 271 U/L — SIGNIFICANT CHANGE UP (ref 73–393)
LYMPHOCYTES # BLD AUTO: 1.02 K/UL — SIGNIFICANT CHANGE UP (ref 1–3.3)
LYMPHOCYTES # BLD AUTO: 9.3 % — LOW (ref 13–44)
MCHC RBC-ENTMCNC: 26.1 PG — LOW (ref 27–34)
MCHC RBC-ENTMCNC: 32.2 GM/DL — SIGNIFICANT CHANGE UP (ref 32–36)
MCV RBC AUTO: 81.2 FL — SIGNIFICANT CHANGE UP (ref 80–100)
MONOCYTES # BLD AUTO: 0.97 K/UL — HIGH (ref 0–0.9)
MONOCYTES NFR BLD AUTO: 8.8 % — SIGNIFICANT CHANGE UP (ref 2–14)
MRSA PCR RESULT.: SIGNIFICANT CHANGE UP
NEUTROPHILS # BLD AUTO: 8.85 K/UL — HIGH (ref 1.8–7.4)
NEUTROPHILS NFR BLD AUTO: 80.7 % — HIGH (ref 43–77)
NITRITE UR-MCNC: NEGATIVE — SIGNIFICANT CHANGE UP
NRBC # BLD: 0 /100 WBCS — SIGNIFICANT CHANGE UP (ref 0–0)
PH UR: 5 — SIGNIFICANT CHANGE UP (ref 5–8)
PLATELET # BLD AUTO: 203 K/UL — SIGNIFICANT CHANGE UP (ref 150–400)
POTASSIUM SERPL-MCNC: 3.3 MMOL/L — LOW (ref 3.5–5.3)
POTASSIUM SERPL-SCNC: 3.3 MMOL/L — LOW (ref 3.5–5.3)
PROT SERPL-MCNC: 6.3 G/DL — SIGNIFICANT CHANGE UP (ref 6–8.3)
PROT UR-MCNC: 30
RBC # BLD: 3.56 M/UL — LOW (ref 3.8–5.2)
RBC # FLD: 17.7 % — HIGH (ref 10.3–14.5)
S AUREUS DNA NOSE QL NAA+PROBE: SIGNIFICANT CHANGE UP
SODIUM SERPL-SCNC: 139 MMOL/L — SIGNIFICANT CHANGE UP (ref 135–145)
SP GR SPEC: 1.02 — SIGNIFICANT CHANGE UP (ref 1–1.03)
UROBILINOGEN FLD QL: 0.2 — SIGNIFICANT CHANGE UP (ref 0.2–1)
WBC # BLD: 10.97 K/UL — HIGH (ref 3.8–10.5)
WBC # FLD AUTO: 10.97 K/UL — HIGH (ref 3.8–10.5)

## 2023-08-23 PROCEDURE — 74183 MRI ABD W/O CNTR FLWD CNTR: CPT | Mod: 26

## 2023-08-23 PROCEDURE — 93970 EXTREMITY STUDY: CPT | Mod: 26

## 2023-08-23 PROCEDURE — 93010 ELECTROCARDIOGRAM REPORT: CPT

## 2023-08-23 PROCEDURE — 99222 1ST HOSP IP/OBS MODERATE 55: CPT

## 2023-08-23 RX ORDER — MORPHINE SULFATE 50 MG/1
2 CAPSULE, EXTENDED RELEASE ORAL EVERY 4 HOURS
Refills: 0 | Status: DISCONTINUED | OUTPATIENT
Start: 2023-08-23 | End: 2023-08-25

## 2023-08-23 RX ORDER — POTASSIUM CHLORIDE 20 MEQ
40 PACKET (EA) ORAL EVERY 4 HOURS
Refills: 0 | Status: COMPLETED | OUTPATIENT
Start: 2023-08-23 | End: 2023-08-23

## 2023-08-23 RX ORDER — MORPHINE SULFATE 50 MG/1
0.5 CAPSULE, EXTENDED RELEASE ORAL EVERY 4 HOURS
Refills: 0 | Status: DISCONTINUED | OUTPATIENT
Start: 2023-08-23 | End: 2023-08-25

## 2023-08-23 RX ORDER — MEROPENEM 1 G/30ML
1000 INJECTION INTRAVENOUS EVERY 8 HOURS
Refills: 0 | Status: DISCONTINUED | OUTPATIENT
Start: 2023-08-23 | End: 2023-08-25

## 2023-08-23 RX ORDER — SODIUM CHLORIDE 9 MG/ML
1000 INJECTION INTRAMUSCULAR; INTRAVENOUS; SUBCUTANEOUS
Refills: 0 | Status: DISCONTINUED | OUTPATIENT
Start: 2023-08-23 | End: 2023-08-25

## 2023-08-23 RX ORDER — LACTOBACILLUS ACIDOPHILUS 100MM CELL
1 CAPSULE ORAL DAILY
Refills: 0 | Status: DISCONTINUED | OUTPATIENT
Start: 2023-08-23 | End: 2023-08-25

## 2023-08-23 RX ORDER — SODIUM CHLORIDE 9 MG/ML
1000 INJECTION INTRAMUSCULAR; INTRAVENOUS; SUBCUTANEOUS
Refills: 0 | Status: DISCONTINUED | OUTPATIENT
Start: 2023-08-23 | End: 2023-08-23

## 2023-08-23 RX ORDER — MORPHINE SULFATE 50 MG/1
1 CAPSULE, EXTENDED RELEASE ORAL EVERY 4 HOURS
Refills: 0 | Status: DISCONTINUED | OUTPATIENT
Start: 2023-08-23 | End: 2023-08-25

## 2023-08-23 RX ADMIN — Medication 81 MILLIGRAM(S): at 13:12

## 2023-08-23 RX ADMIN — ESCITALOPRAM OXALATE 20 MILLIGRAM(S): 10 TABLET, FILM COATED ORAL at 13:13

## 2023-08-23 RX ADMIN — ENOXAPARIN SODIUM 40 MILLIGRAM(S): 100 INJECTION SUBCUTANEOUS at 21:17

## 2023-08-23 RX ADMIN — SODIUM CHLORIDE 100 MILLILITER(S): 9 INJECTION INTRAMUSCULAR; INTRAVENOUS; SUBCUTANEOUS at 13:18

## 2023-08-23 RX ADMIN — Medication 650 MILLIGRAM(S): at 06:05

## 2023-08-23 RX ADMIN — MEROPENEM 100 MILLIGRAM(S): 1 INJECTION INTRAVENOUS at 13:16

## 2023-08-23 RX ADMIN — OXCARBAZEPINE 300 MILLIGRAM(S): 300 TABLET, FILM COATED ORAL at 17:08

## 2023-08-23 RX ADMIN — Medication 650 MILLIGRAM(S): at 06:40

## 2023-08-23 RX ADMIN — Medication 40 MILLIEQUIVALENT(S): at 13:16

## 2023-08-23 RX ADMIN — PIPERACILLIN AND TAZOBACTAM 25 GRAM(S): 4; .5 INJECTION, POWDER, LYOPHILIZED, FOR SOLUTION INTRAVENOUS at 05:09

## 2023-08-23 RX ADMIN — SODIUM CHLORIDE 75 MILLILITER(S): 9 INJECTION INTRAMUSCULAR; INTRAVENOUS; SUBCUTANEOUS at 19:49

## 2023-08-23 RX ADMIN — Medication 40 MILLIEQUIVALENT(S): at 17:08

## 2023-08-23 RX ADMIN — ATORVASTATIN CALCIUM 80 MILLIGRAM(S): 80 TABLET, FILM COATED ORAL at 21:17

## 2023-08-23 RX ADMIN — MEROPENEM 100 MILLIGRAM(S): 1 INJECTION INTRAVENOUS at 21:17

## 2023-08-23 RX ADMIN — Medication 1000 UNIT(S): at 13:13

## 2023-08-23 RX ADMIN — OXCARBAZEPINE 300 MILLIGRAM(S): 300 TABLET, FILM COATED ORAL at 08:08

## 2023-08-23 RX ADMIN — Medication 1 TABLET(S): at 17:33

## 2023-08-23 RX ADMIN — PANTOPRAZOLE SODIUM 40 MILLIGRAM(S): 20 TABLET, DELAYED RELEASE ORAL at 05:09

## 2023-08-23 NOTE — CONSULT NOTE ADULT - ASSESSMENT
82 y/o F w/ PMHx of HLD, osteoarthritis, GERD, depression/anxiety, varicose veins ,Left  TKR  presents w/ sudden onset 10/10 upper abdominal pain since Sunday evening, pain radiation to bilateral lower back & bilateral shoulders. CT A/P : Mild, acute interstitial edematous pancreatitis in the pancreatic tail. CXR : Intrathoracic gastric fundal and body herniation ..RUQ U/S Echogenicity in the region of the gallbladder neck suggests small stones versus gravel. Gallbladder wall thickening identified measuring 7-8 mm. Trace pericholecystic fluid. Correlate for cholecystitis. Admitted for Gall stone  edematous pancreatitis & AC  Cholecystitis, Cholelithiasis  and Intrathoracic gastric fundal and body herniation    Acute Abdominal Pain 2/2 Gallstone Pancreatitis/Cholecystitis  LLE Cellulitis  - CT A/P : Mild, acute interstitial edematous pancreatitis in the pancreatic tail. .  - RUQ U/S Echogenicity in the region of the gallbladder neck suggests small stones versus gravel. Gallbladder wall thickening identified measuring 7-8 mm. Trace pericholecystic fluid. Correlate for cholecystitis.  - elevated LFTs, lipase    Recommendations:    On zosyn. Pt reporting diarrhea since being on Abx  Will switch to meropenem to see if any improvement in AE  If no improvement on LLE, will also add vanc, but hold for now    F/u GI PCR  F/u pending cx  Pending MRCP    Trend temps/WBC  Trend LFTs/lipase  Serial abdominal exams  Appreciate GI recs  Appreciate surgery recs    D/w Dr. Kwok and team    Infectious Diseases will continue to follow. Please call with any questions.   Ne Kwok M.D.  [facility] [place] Division of Infectious Diseases [phone]

## 2023-08-23 NOTE — CONSULT NOTE ADULT - SUBJECTIVE AND OBJECTIVE BOX
[facility]  Daren Quach PA-C  [address] [place] [id number] [phone]      Chief Complaint:  Patient is a 83y old  Female who presents with a chief complaint of cholecystitis, acute interstitial edematous pancreatitis.  Intrathoracic gastric fundal and body herniation (23 Aug 2023 12:27)      HPI:  82 y/o F w/ PMHx of HLD, osteoarthritis, GERD, depression/anxiety, varicose veins, B/L TKR  presents w/ sudden onset 10/10 upper abdominal pain since  evening, pain radiation to bilateral lower back & bilateral shoulders. States  the pain was accompanied w/ nausea and several episodes of emesis that same night. She has had not appetite since then and has not eaten since  night. Admits to chills, weakness, fatigue, nausea, abd bloating and several episodes of diarrhea since arrival to the ED. Denies headaches, chest pain, palpitations, shortness of breath, acid reflux, vomiting or urinary sx. Reports no past hx of pancreatitis. No hx of cancer.   Last colonoscopy was around 5 years ago, family hx of Crohn's - sister and granddaughter. Denies any recent alcohol use, drug abuse.     INTERVAL HPI:  Pt s/e with 2 family members at bedside  Discussed same hx as above  Pt currently having diarrhea and epigastric pain  Last colonoscopy 5 years ago  Otherwise no GI complaints    Allergies:  lactose (Diarrhea)  adhesives (Pruritus; Rash)  No Known Drug Allergies      Medications:  acetaminophen     Tablet .. 650 milliGRAM(s) Oral every 6 hours PRN  aspirin enteric coated 81 milliGRAM(s) Oral daily  atorvastatin 80 milliGRAM(s) Oral at bedtime  cholecalciferol 1000 Unit(s) Oral daily  enoxaparin Injectable 40 milliGRAM(s) SubCutaneous every 24 hours  escitalopram 20 milliGRAM(s) Oral daily  melatonin 3 milliGRAM(s) Oral at bedtime PRN  meropenem  IVPB 1000 milliGRAM(s) IV Intermittent every 8 hours  morphine  - Injectable 0.5 milliGRAM(s) IV Push every 4 hours PRN  morphine  - Injectable 2 milliGRAM(s) IV Push every 4 hours PRN  morphine  - Injectable 1 milliGRAM(s) IV Push every 4 hours PRN  ondansetron Injectable 4 milliGRAM(s) IV Push every 8 hours PRN  OXcarbazepine 300 milliGRAM(s) Oral two times a day  pantoprazole    Tablet 40 milliGRAM(s) Oral before breakfast  potassium chloride    Tablet ER 40 milliEquivalent(s) Oral every 4 hours  sodium chloride 0.9%. 1000 milliLiter(s) IV Continuous <Continuous>      PMHX/PSHX:  Anxiety    Depression    Hyperlipidemia    History of prediabetes    Osteoarthritis    Dizziness    GERD (gastroesophageal reflux disease)    History of vascular disease    Osteoarthritis of left knee    HLD (hyperlipidemia)    S/P foot surgery, right    S/P wrist surgery    S/P tonsillectomy    S/P cataract surgery    S/P total knee replacement, right    S/P total knee replacement, left        Family history:  Family history of vascular disease    Family history- stomach cancer      ROS:     General:  No fevers, chills, night sweats, fatigue,   Eyes:  Good vision, no reported pain  ENT:  No sore throat, pain, runny nose, dysphagia  CV:  No pain, palpitations, hypo/hypertension  Resp:  No dyspnea, cough, tachypnea, wheezing  GI:  +pain, No nausea, No vomiting, +diarrhea, No constipation, No weight loss, No fever, No pruritis, No rectal bleeding, No tarry stools, No dysphagia,  :  No pain, bleeding, incontinence, nocturia  Muscle:  No pain, weakness  Neuro:  No weakness, tingling, memory problems  Psych:  No fatigue, insomnia, mood problems, depression  Endocrine:  No polyuria, polydipsia, cold/heat intolerance  Heme:  No petechiae, ecchymosis, easy bruisability  Skin:  No rash, tattoos, scars, edema      PHYSICAL EXAM:   Vital Signs:  Vital Signs Last 24 Hrs  T(C): 36.3 (23 Aug 2023 12:28), Max: 36.9 (22 Aug 2023 19:31)  T(F): 97.4 (23 Aug 2023 12:28), Max: 98.4 (22 Aug 2023 19:31)  HR: 83 (23 Aug 2023 12:28) (83 - 95)  BP: 114/68 (23 Aug 2023 12:28) (111/72 - 130/79)  BP(mean): --  RR: 18 (23 Aug 2023 12:28) (18 - 20)  SpO2: 93% (23 Aug 2023 12:28) (93% - 100%)    Parameters below as of 23 Aug 2023 12:28  Patient On (Oxygen Delivery Method): room air      Daily     Daily Weight in k.1 (23 Aug 2023 05:28)    GENERAL:  Appears stated age  HEENT:  NC/AT  CHEST:  Full & symmetric excursion  HEART:  Regular rhythm  ABDOMEN:  Soft, +epigastric tender, non-distended  EXTEREMITIES:  no cyanosis, clubbing or edema  SKIN:  No rash  NEURO:  Alert,    LABS:                        9.3    10.97 )-----------( 203      ( 23 Aug 2023 08:06 )             28.9     08-23    139  |  109<H>  |  9   ----------------------------<  84  3.3<L>   |  23  |  0.45<L>    Ca    8.2<L>      23 Aug 2023 08:06    TPro  6.3  /  Alb  2.7<L>  /  TBili  0.5  /  DBili  x   /  AST  59<H>  /  ALT  54  /  AlkPhos  126<H>  08-23    LIVER FUNCTIONS - ( 23 Aug 2023 08:06 )  Alb: 2.7 g/dL / Pro: 6.3 g/dL / ALK PHOS: 126 U/L / ALT: 54 U/L / AST: 59 U/L / GGT: x             Urinalysis Basic - ( 23 Aug 2023 08:06 )    Color: x / Appearance: x / SG: x / pH: x  Gluc: 84 mg/dL / Ketone: x  / Bili: x / Urobili: x   Blood: x / Protein: x / Nitrite: x   Leuk Esterase: x / RBC: x / WBC x   Sq Epi: x / Non Sq Epi: x / Bacteria: x      Amylase [id number]      Lipase [id number]       IMAGING:  < from: CT Abdomen and Pelvis w/ IV Cont (23 @ 12:00) >    ACC: [id number] EXAM:  CT ABDOMEN AND PELVIS IC   ORDERED BY: LIUDMILA DALTON     PROCEDURE DATE:  2023          INTERPRETATION:  CLINICAL INFORMATION: Right lower quadrant abdominal pain    COMPARISON: None.    CONTRAST/COMPLICATIONS:  IV Contrast: Omnipaque 350  90 cc administered   10 cc discarded  Oral Contrast: NONE  Complications: None reported at time of study completion    PROCEDURE:  CT of the Abdomen and Pelvis was performed.  Sagittal and coronal reformats were performed.    FINDINGS:  LOWER CHEST: Bibasilar atelectasis.    LIVER: Fatty changes in segment 4 of the liver.  BILE DUCTS: Normal caliber.  GALLBLADDER: The gallbladder is mildly distended and measures 9.0 x 4.0 x   5.1 cm. there is edema in the gallbladder wall and mildly increased   enhancement. No pericholecystic stranding is seen.  SPLEEN: Within normal limits.  PANCREAS: The pancreas enhances homogeneously. There is mild edema within   and surrounding the pancreatic tail parenchyma. No discrete fluid   collections. No pancreatic duct dilatation.  ADRENALS: Within normal limits.  KIDNEYS/URETERS: Multiple subcentimeter hypodensities bilaterally which   are not fully characterized. Parapelvic cysts are noted. No   hydronephrosis. No nephroureterolithiasis.    BLADDER: There is a tiny focus of air in the lumen of the urinary bladder.  REPRODUCTIVE ORGANS: Uterus and adnexa within normal limits.    BOWEL: Moderate sized hiatal hernia. No obstruction. No bowel   obstruction. Appendix is normal. Scattered colonic diverticula are not   acutely inflamed.  PERITONEUM: No ascites. No pneumoperitoneum. No loculated fluid   collections.  VESSELS: The abdominal aorta has normal caliber with moderate   atherosclerotic changes. 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 SMA patent, MARK   diminutive and patent. The portal vein, splenic vein, and superior   mesenteric vein are patent. A 1.1 cm, almost circumferentially calcified   splenic artery aneurysm is noted in the splenic hilum.  RETROPERITONEUM/LYMPH NODES: No lymphadenopathy.  ABDOMINAL WALL: Small fat-containing umbilical hernia.  BONES: S-shaped scoliosis of the thoracolumbar spine. Discogenic   degenerative disease. Sclerotic focus in L4, likely benign.    IMPRESSION:  Mild, acute interstitial edematous pancreatitis in the pancreatic tail.   No discrete fluid collections.    Edematous gallbladder wall and increased enhancement. The finding may be   secondary the presence of inflammation inthe pancreas and  versus acute   cholecystitis. Recommend right upper quadrant abdominal ultrasound for   further evaluation and correlation.    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    Focus of air in the bladder. Correlate with instrumentation versus   infection. Correlate with urinalysis.        --- End of Report ---           EDITH DIAZ MD; Resident Radiologist  This document has been electronically signed.  MARCELLO HAWK MD; Attending Radiologist  This document has been electronically signed. Aug 22 2023  3:51PM    < end of copied text >      < from: US Abdomen Upper Quadrant Right (23 @ 15:15) >    ACC: [id number] EXAM:  US ABDOMEN RT UPR QUADRANT   ORDERED BY: IZABELA FORD     PROCEDURE DATE:  2023          INTERPRETATION:  CLINICAL INFORMATION: Assess for gallstones/pancreatitis.    COMPARISON: None available.    TECHNIQUE: Sonography of the right upper quadrant. Evaluation limited   secondary to increased bowel gas.    FINDINGS:  Liver: Within normal limits.  Bile ducts: Normal caliber. Common bile duct measures 7 mm.  Gallbladder: Echogenicity in the region of the gallbladder neck suggests   small stones versus gravel. Gallbladder wall thickening identified   measuring 7-8 mm. Trace pericholecystic fluid.  Pancreas: Visualized portions are within normal limits.  Right kidney: 9.1 cm. No hydronephrosis. 1.2 cm anechoic region right   renal hilus, likely parapelvic cyst.  Ascites: None.  IVC: Visualized portions are within normal limits.    IMPRESSION:  Echogenicity in the region of the gallbladder neck suggests small stones   versus gravel. Gallbladder wall thickeningidentified measuring 7-8 mm.   Trace pericholecystic fluid. Correlate for cholecystitis.          --- End of Report ---            STEFFANIE SMITH MD; Attending Radiologist  This document has been electronically signed. Aug 22 2023  3:36PM    < end of copied text >

## 2023-08-23 NOTE — DISCHARGE NOTE PROVIDER - NPI NUMBER (FOR SYSADMIN USE ONLY) :
[[id number] [[id number]] [[id number]]

## 2023-08-23 NOTE — CONSULT NOTE ADULT - SUBJECTIVE AND OBJECTIVE BOX
Full note to follow [facility], Division of Infectious Diseases  DIALLO Maria S. Shah, Y. Patel, G. [facility]  [phone]    HOMAR DRAKE  83y, Female  [id number]    HPI--  HPI:  82 y/o F w/ PMHx of HLD, osteoarthritis, GERD, depression/anxiety, varicose veins, B/L TKR  presents w/ sudden onset 10/10 upper abdominal pain since Sunday evening, pain radiation to bilateral lower back & bilateral shoulders. States  the pain was accompanied w/ nausea and several episodes of emesis that same night. She has had not appetite since then and has not eaten since Sunday night. Admits to chills, weakness, fatigue, nausea, abd bloating and several episodes of diarrhea since arrival to the ED. Denies headaches, chest pain, palpitations, shortness of breath, acid reflux, vomiting or urinary sx. Reports no past hx of pancreatitis. No hx of cancer.   Last colonoscopy was around 5 years ago, family hx of Crohn's - sister and granddaughter. Denies any recent alcohol use, drug abuse.     IN ED,  VITALS /69 HR 94 RR 20 Temp 98F on RA   PERTINENT LABS: Wbc 12k 82% predominant neutrophils, hb 11.3 RDW index 17.5   K 3.4 Glucose 106 AST 44 ALK po4 146  Lipase 2483  CT A/P : Mild, acute interstitial edematous pancreatitis in the pancreatic tail.  No discrete fluid collections. Edematous gallbladder wall and increased enhancement. The finding may be   secondary the presence of inflammation in the pancreas and  versus acute  cholecystitis. Recommend right upper quadrant abdominal ultrasound for  further evaluation and correlation.  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    Focus of air in the bladder. Correlate with instrumentation versus   infection. Correlate with urinalysis.    CXR : Intrathoracic gastric fundal and body herniation. Please see lower chest images of abdominal CT scan 8/22/2023. No airspace consolidation.    RUQ U/S Echogenicity in the region of the gallbladder neck suggests small stones versus gravel. Gallbladder wall thickening identified measuring 7-8 mm. Trace pericholecystic fluid. Correlate for cholecystitis.  s/ p  1X iv Tylenol zosyn, potassium 40mg X1,  2L NS in ER   EKG shows NSR 87 bpm QTc 462  (22 Aug 2023 20:04)        Active Medications--  acetaminophen     Tablet .. 650 milliGRAM(s) Oral every 6 hours PRN  aspirin enteric coated 81 milliGRAM(s) Oral daily  atorvastatin 80 milliGRAM(s) Oral at bedtime  cholecalciferol 1000 Unit(s) Oral daily  enoxaparin Injectable 40 milliGRAM(s) SubCutaneous every 24 hours  escitalopram 20 milliGRAM(s) Oral daily  melatonin 3 milliGRAM(s) Oral at bedtime PRN  meropenem  IVPB 1000 milliGRAM(s) IV Intermittent every 8 hours  ondansetron Injectable 4 milliGRAM(s) IV Push every 8 hours PRN  OXcarbazepine 300 milliGRAM(s) Oral two times a day  pantoprazole    Tablet 40 milliGRAM(s) Oral before breakfast  potassium chloride    Tablet ER 40 milliEquivalent(s) Oral every 4 hours  sodium chloride 0.9%. 1000 milliLiter(s) IV Continuous <Continuous>    Antimicrobials:   meropenem  IVPB 1000 milliGRAM(s) IV Intermittent every 8 hours    Immunologic:     ROS:  CONSTITUTIONAL: No fevers or chills. No weakness or headache. No weight changes.  EYES/ENT: No visual or hearing changes. No sore throat or throat pain .  NECK: No pain or stiffness  RESPIRATORY: No cough, wheezing, or hemoptysis. No shortness of breath  CARDIOVASCULAR: No chest pain or palpitations  GASTROINTESTINAL: No abdominal pain. No nausea or vomiting. No diarrhea or constipation.  GENITOURINARY: No dysuria, frequency or hematuria  NEUROLOGICAL: No numbness or weakness  SKIN: No itching or rashes  PSYCHIATRIC: Pleasant. Appropriate affect    Allergies: adhesives (Pruritus; Rash)  No Known Drug Allergies    PMH -- Anxiety    Depression    Hyperlipidemia    History of prediabetes    Osteoarthritis    Dizziness    GERD (gastroesophageal reflux disease)    History of vascular disease    Osteoarthritis of left knee    HLD (hyperlipidemia)      PSH -- S/P foot surgery, right    S/P wrist surgery    S/P tonsillectomy    S/P cataract surgery    S/P total knee replacement, right    S/P total knee replacement, left      FH -- Family history of vascular disease    Family history- stomach cancer      Social History --  EtOH: denies   Tobacco: denies   Drug Use: denies     Travel/Environmental/Occupational History:    Physical Exam--  Vital Signs Last 24 Hrs  T(F): 98 (23 Aug 2023 05:28), Max: 98.4 (22 Aug 2023 19:31)  HR: 90 (23 Aug 2023 05:28) (88 - 95)  BP: 125/76 (23 Aug 2023 05:28) (111/72 - 130/79)  RR: 18 (23 Aug 2023 05:28) (18 - 20)  SpO2: 94% (23 Aug 2023 05:28) (94% - 100%)  General: nontoxic-appearing, no acute distress  HEENT: NC/AT, EOMI,   Lungs: Clear bilaterally without rales, wheezing or rhonchi  Heart: Regular rate and rhythm. No murmur, rub or gallop.  Abdomen: Soft. No costovertebral angle tenderness.  Extremities: No cyanosis or clubbing. No edema.   Skin: Warm. Dry. Good turgor.     Laboratory & Imaging Data:  CBC:                       9.3    10.97 )-----------( 203      ( 23 Aug 2023 08:06 )             28.9     CMP: 08-23    139  |  109<H>  |  9   ----------------------------<  84  3.3<L>   |  23  |  0.45<L>    Ca    8.2<L>      23 Aug 2023 08:06    TPro  6.3  /  Alb  2.7<L>  /  TBili  0.5  /  DBili  x   /  AST  59<H>  /  ALT  54  /  AlkPhos  126<H>  08-23    LIVER FUNCTIONS - ( 23 Aug 2023 08:06 )  Alb: 2.7 g/dL / Pro: 6.3 g/dL / ALK PHOS: 126 U/L / ALT: 54 U/L / AST: 59 U/L / GGT: x           Urinalysis Basic - ( 23 Aug 2023 08:06 )    Color: x / Appearance: x / SG: x / pH: x  Gluc: 84 mg/dL / Ketone: x  / Bili: x / Urobili: x   Blood: x / Protein: x / Nitrite: x   Leuk Esterase: x / RBC: x / WBC x   Sq Epi: x / Non Sq Epi: x / Bacteria: x        Microbiology: reviewed        Radiology: reviewed    < from: CT Abdomen and Pelvis w/ IV Cont (08.22.23 @ 12:00) >    ACC: [id number] EXAM:  CT ABDOMEN AND PELVIS IC   ORDERED BY: LIUDMILA DALTON     PROCEDURE DATE:  08/22/2023          INTERPRETATION:  CLINICAL INFORMATION: Right lower quadrant abdominal pain    COMPARISON: None.    CONTRAST/COMPLICATIONS:  IV Contrast: Omnipaque 350  90 cc administered   10 cc discarded  Oral Contrast: NONE  Complications: None reported at time of study completion    PROCEDURE:  CT of the Abdomen and Pelvis was performed.  Sagittal and coronal reformats were performed.    FINDINGS:  LOWER CHEST: Bibasilar atelectasis.    LIVER: Fatty changes in segment 4 of the liver.  BILE DUCTS: Normal caliber.  GALLBLADDER: The gallbladder is mildly distended and measures 9.0 x 4.0 x   5.1 cm. there is edema in the gallbladder wall and mildly increased   enhancement. No pericholecystic stranding is seen.  SPLEEN: Within normal limits.  PANCREAS: The pancreas enhances homogeneously. There is mild edema within   and surrounding the pancreatic tail parenchyma. No discrete fluid   collections. No pancreatic duct dilatation.  ADRENALS: Within normal limits.  KIDNEYS/URETERS: Multiple subcentimeter hypodensities bilaterally which   are not fully characterized. Parapelvic cysts are noted. No   hydronephrosis. No nephroureterolithiasis.    BLADDER: There is a tiny focus of air in the lumen of the urinary bladder.  REPRODUCTIVE ORGANS: Uterus and adnexa within normal limits.    BOWEL: Moderate sized hiatal hernia. No obstruction. No bowel   obstruction. Appendix is normal. Scattered colonic diverticula are not   acutely inflamed.  PERITONEUM: No ascites. No pneumoperitoneum. No loculated fluid   collections.  VESSELS: The abdominal aorta has normal caliber with moderate   atherosclerotic changes. 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 SMA patent, MARK   diminutive and patent. The portal vein, splenic vein, and superior   mesenteric vein are patent. A 1.1 cm, almost circumferentially calcified   splenic artery aneurysm is noted in the splenic hilum.  RETROPERITONEUM/LYMPH NODES: No lymphadenopathy.  ABDOMINAL WALL: Small fat-containing umbilical hernia.  BONES: S-shaped scoliosis of the thoracolumbar spine. Discogenic   degenerative disease. Sclerotic focus in L4, likely benign.    IMPRESSION:  Mild, acute interstitial edematous pancreatitis in the pancreatic tail.   No discrete fluid collections.    Edematous gallbladder wall and increased enhancement. The finding may be   secondary the presence of inflammation inthe pancreas and  versus acute   cholecystitis. Recommend right upper quadrant abdominal ultrasound for   further evaluation and correlation.    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    Focus of air in the bladder. Correlate with instrumentation versus   infection. Correlate with urinalysis.        --- End of Report ---           EDITH DIAZ MD; Resident Radiologist  This document has been electronically signed.  MARCELLO HAWK MD; Attending Radiologist  This document has been electronically signed. Aug 22 2023  3:51PM    < end of copied text >  < from: US Abdomen Upper Quadrant Right (08.22.23 @ 15:15) >    ACC: [id number] EXAM:  US ABDOMEN RT UPR QUADRANT   ORDERED BY: IZABELA FORD     PROCEDURE DATE:  08/22/2023          INTERPRETATION:  CLINICAL INFORMATION: Assess for gallstones/pancreatitis.    COMPARISON: None available.    TECHNIQUE: Sonography of the right upper quadrant. Evaluation limited   secondary to increased bowel gas.    FINDINGS:  Liver: Within normal limits.  Bile ducts: Normal caliber. Common bile duct measures 7 mm.  Gallbladder: Echogenicity in the region of the gallbladder neck suggests   small stones versus gravel. Gallbladder wall thickening identified   measuring 7-8 mm. Trace pericholecystic fluid.  Pancreas: Visualized portions are within normal limits.  Right kidney: 9.1 cm. No hydronephrosis. 1.2 cm anechoic region right   renal hilus, likely parapelvic cyst.  Ascites: None.  IVC: Visualized portions are within normal limits.    IMPRESSION:  Echogenicity in the region of the gallbladder neck suggests small stones   versus gravel. Gallbladder wall thickeningidentified measuring 7-8 mm.   Trace pericholecystic fluid. Correlate for cholecystitis.          --- End of Report ---            STEFFANIE SMITH MD; Attending Radiologist  This document has been electronically signed. Aug 22 2023  3:36PM    < end of copied text >  < from: US Duplex Venous Lower Ext Complete, Bilateral (08.23.23 @ 10:35) >    ACC: [id number] EXAM:  US DPLX LWR EXT VEINS COMPL BI   ORDERED BY:   VIV PENALOZA     PROCEDURE DATE:  08/23/2023          INTERPRETATION:  CLINICAL INFORMATION: Left calf tenderness.    COMPARISON: None available.    TECHNIQUE: Duplex sonography of the BILATERAL LOWER extremity veins with   color and spectral Doppler, with and without compression.    FINDINGS:    RIGHT:  Normal compressibility of the RIGHT common femoral, femoral and popliteal   veins.  Doppler examination shows normal spontaneous and phasic flow.  No RIGHT calf vein thrombosis is detected.    LEFT:  Normal compressibility of the LEFT common femoral, femoral and popliteal   veins.  Doppler examination shows normal spontaneous and phasic flow.  No LEFT calf vein thrombosis is detected.    IMPRESSION:  No evidence of deep venous thrombosis in either lower extremity.            --- End of Report ---            TERESITA SMALLS MD; Attending Radiologist  This document has been electronically signed. Aug 23 2023 10:58AM    < end of copied text >

## 2023-08-23 NOTE — DISCHARGE NOTE PROVIDER - NSDCFUSCHEDAPPT_GEN_ALL_CORE_FT
[facility]  ORTHOSURG [address] [facility]  Scheduled Appointment: 10/31/2023

## 2023-08-23 NOTE — DISCHARGE NOTE PROVIDER - HOSPITAL COURSE
HPI:  84 y/o F w/ PMHx of HLD, osteoarthritis, GERD, depression/anxiety, varicose veins, B/L TKR  presents w/ sudden onset 10/10 upper abdominal pain since Sunday evening, pain radiation to bilateral lower back & bilateral shoulders. States  the pain was accompanied w/ nausea and several episodes of emesis that same night. She has had not appetite since then and has not eaten since Sunday night. Admits to chills, weakness, fatigue, nausea, abd bloating and several episodes of diarrhea since arrival to the ED. Denies headaches, chest pain, palpitations, shortness of breath, acid reflux, vomiting or urinary sx. Reports no past hx of pancreatitis. No hx of cancer.   Last colonoscopy was around 5 years ago, family hx of Crohn's - sister and granddaughter. Denies any recent alcohol use, drug abuse.     IN ED,  VITALS /69 HR 94 RR 20 Temp 98F on RA   PERTINENT LABS: Wbc 12k 82% predominant neutrophils, hb 11.3 RDW index 17.5   K 3.4 Glucose 106 AST 44 ALK po4 146  Lipase 2483  CT A/P : Mild, acute interstitial edematous pancreatitis in the pancreatic tail.  No discrete fluid collections. Edematous gallbladder wall and increased enhancement. The finding may be   secondary the presence of inflammation in the pancreas and  versus acute  cholecystitis. Recommend right upper quadrant abdominal ultrasound for  further evaluation and correlation.  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    Focus of air in the bladder. Correlate with instrumentation versus   infection. Correlate with urinalysis.    CXR : Intrathoracic gastric fundal and body herniation. Please see lower chest images of abdominal CT scan 8/22/2023. No airspace consolidation.    RUQ U/S Echogenicity in the region of the gallbladder neck suggests small stones versus gravel. Gallbladder wall thickening identified measuring 7-8 mm. Trace pericholecystic fluid. Correlate for cholecystitis.  s/ p  1X iv Tylenol zosyn, potassium 40mg X1,  2L NS in ER   EKG shows NSR 87 bpm QTc 462  (22 Aug 2023 20:04)      ---  HOSPITAL COURSE:     Patient's clinical condition improved throughout hospital course and patient is stable for discharge *** with close outpatient follow up.     ---  CONSULTANTS:     ---  Physical Exam on the day of discharge:    ---  TIME SPENT:  I, the attending physician, was physically present for the key portions of the evaluation and management (E/M) service provided. The total amount of time spent reviewing the hospital notes, laboratory values, imaging findings, assessing/counseling the patient, discussing with consultant physicians, social work, nursing staff was -- minutes    ---  Primary care provider was made aware of plan for discharge:      [  ] NO     [  ] YES HPI:  82 y/o F w/ PMHx of HLD, osteoarthritis, GERD, depression/anxiety, varicose veins, B/L TKR  presents w/ sudden onset 10/10 upper abdominal pain since Sunday evening, pain radiation to bilateral lower back & bilateral shoulders. States  the pain was accompanied w/ nausea and several episodes of emesis that same night. She has had not appetite since then and has not eaten since Sunday night. Admits to chills, weakness, fatigue, nausea, abd bloating and several episodes of diarrhea since arrival to the ED. Denies headaches, chest pain, palpitations, shortness of breath, acid reflux, vomiting or urinary sx. Reports no past hx of pancreatitis. No hx of cancer.   Last colonoscopy was around 5 years ago, family hx of Crohn's - sister and granddaughter. Denies any recent alcohol use, drug abuse.     IN ED,  VITALS /69 HR 94 RR 20 Temp 98F on RA   PERTINENT LABS: Wbc 12k 82% predominant neutrophils, hb 11.3 RDW index 17.5   K 3.4 Glucose 106 AST 44 ALK po4 146  Lipase 2483  CT A/P : Mild, acute interstitial edematous pancreatitis in the pancreatic tail.  No discrete fluid collections. Edematous gallbladder wall and increased enhancement. The finding may be   secondary the presence of inflammation in the pancreas and  versus acute  cholecystitis. Recommend right upper quadrant abdominal ultrasound for  further evaluation and correlation.  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    Focus of air in the bladder. Correlate with instrumentation versus   infection. Correlate with urinalysis.    CXR : Intrathoracic gastric fundal and body herniation. Please see lower chest images of abdominal CT scan 8/22/2023. No airspace consolidation.    RUQ U/S Echogenicity in the region of the gallbladder neck suggests small stones versus gravel. Gallbladder wall thickening identified measuring 7-8 mm. Trace pericholecystic fluid. Correlate for cholecystitis.  s/ p  1X iv Tylenol zosyn, potassium 40mg X1,  2L NS in ER   EKG shows NSR 87 bpm QTc 462  (22 Aug 2023 20:04)      ---  HOSPITAL COURSE: Patient admitted for pancreatitis. CT A/P showed Mild, acute interstitial edematous pancreatitis in the pancreatic tail. RUQ U/S showed echogenicity in the region of the gallbladder neck suggests small stones versus gravel. Gallbladder wall thickening identified measuring 7-8 mm. Trace pericholecystic fluid. Started on IVF NS @100cc/hr.   GI was consulted, recommended MRCP which showed ________. ID was consulted and patient was started on IV antibiotics. Patient switched from zosyn to meropenem because of diarrhea.   Patient also with L Leg cellulitis. Blood cx _____, Urine Cx____. B/L LE doppler was negative for any acute dvt.   Incidental finding of splenic artery aneurysm on CT a/p. Vasc recommend outpatient follow up.       Patient's clinical condition improved throughout hospital course and patient is stable for discharge *** with close outpatient follow up.     ---  CONSULTANTS:   ID: Dr. Ne Kwok  GI: Dr. Nazia Montenegro: Dr. Escalona   Surgery: Dr. Dawn    ---  Physical Exam on the day of discharge: Refer to progress note from discharge date    ---  TIME SPENT:  I, the attending physician, was physically present for the key portions of the evaluation and management (E/M) service provided. The total amount of time spent reviewing the hospital notes, laboratory values, imaging findings, assessing/counseling the patient, discussing with consultant physicians, social work, nursing staff was -- minutes    ---  Primary care provider was made aware of plan for discharge:      [  ] NO     [  ] YES HPI:  84 y/o F w/ PMHx of HLD, osteoarthritis, GERD, depression/anxiety, varicose veins, B/L TKR  presents w/ sudden onset 10/10 upper abdominal pain since Sunday evening, pain radiation to bilateral lower back & bilateral shoulders. States  the pain was accompanied w/ nausea and several episodes of emesis that same night. She has had not appetite since then and has not eaten since Sunday night. Admits to chills, weakness, fatigue, nausea, abd bloating and several episodes of diarrhea since arrival to the ED. Denies headaches, chest pain, palpitations, shortness of breath, acid reflux, vomiting or urinary sx. Reports no past hx of pancreatitis. No hx of cancer.   Last colonoscopy was around 5 years ago, family hx of Crohn's - sister and granddaughter. Denies any recent alcohol use, drug abuse.     IN ED,  VITALS /69 HR 94 RR 20 Temp 98F on RA   PERTINENT LABS: Wbc 12k 82% predominant neutrophils, hb 11.3 RDW index 17.5   K 3.4 Glucose 106 AST 44 ALK po4 146  Lipase 2483  CT A/P : Mild, acute interstitial edematous pancreatitis in the pancreatic tail.  No discrete fluid collections. Edematous gallbladder wall and increased enhancement. The finding may be   secondary the presence of inflammation in the pancreas and  versus acute  cholecystitis. Recommend right upper quadrant abdominal ultrasound for  further evaluation and correlation.  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    Focus of air in the bladder. Correlate with instrumentation versus   infection. Correlate with urinalysis.    CXR : Intrathoracic gastric fundal and body herniation. Please see lower chest images of abdominal CT scan 8/22/2023. No airspace consolidation.    RUQ U/S Echogenicity in the region of the gallbladder neck suggests small stones versus gravel. Gallbladder wall thickening identified measuring 7-8 mm. Trace pericholecystic fluid. Correlate for cholecystitis.  s/ p  1X iv Tylenol zosyn, potassium 40mg X1,  2L NS in ER   EKG shows NSR 87 bpm QTc 462  (22 Aug 2023 20:04)      ---  HOSPITAL COURSE: Patient admitted for pancreatitis. CT A/P showed Mild, acute interstitial edematous pancreatitis in the pancreatic tail. RUQ U/S showed echogenicity in the region of the gallbladder neck suggests small stones versus gravel. Gallbladder wall thickening identified measuring 7-8 mm. Trace pericholecystic fluid. Started on IVF NS @100cc/hr.   GI was consulted, recommended MRCP which showed mid to distal choledocholithiasis without CBD distenstion. ID was consulted and patient was started on IV antibiotics. Patient switched from zosyn to meropenem because of diarrhea.   Patient also with L Leg cellulitis. Blood cx _____, Urine Cx____. B/L LE doppler was negative for any acute dvt.   Incidental finding of splenic artery aneurysm on CT a/p. Vasc recommend outpatient follow up.       Patient's clinical condition improved throughout hospital course and patient is stable for discharge *** with close outpatient follow up.     ---  CONSULTANTS:   ID: Dr. eN Kwok  GI: Dr. Nazia Montenegro: Dr. Escalona   Surgery: Dr. Dawn    ---  Physical Exam on the day of discharge: Refer to progress note from discharge date    ---  TIME SPENT:  I, the attending physician, was physically present for the key portions of the evaluation and management (E/M) service provided. The total amount of time spent reviewing the hospital notes, laboratory values, imaging findings, assessing/counseling the patient, discussing with consultant physicians, social work, nursing staff was -- minutes    ---  Primary care provider was made aware of plan for discharge:      [  ] NO     [  ] YES HPI:  82 y/o F w/ PMHx of HLD, osteoarthritis, GERD, depression/anxiety, varicose veins, B/L TKR  presents w/ sudden onset 10/10 upper abdominal pain since Sunday evening, pain radiation to bilateral lower back & bilateral shoulders. States  the pain was accompanied w/ nausea and several episodes of emesis that same night. She has had not appetite since then and has not eaten since Sunday night. Admits to chills, weakness, fatigue, nausea, abd bloating and several episodes of diarrhea since arrival to the ED. Denies headaches, chest pain, palpitations, shortness of breath, acid reflux, vomiting or urinary sx. Reports no past hx of pancreatitis. No hx of cancer.   Last colonoscopy was around 5 years ago, family hx of Crohn's - sister and granddaughter. Denies any recent alcohol use, drug abuse.     IN ED,  VITALS /69 HR 94 RR 20 Temp 98F on RA   PERTINENT LABS: Wbc 12k 82% predominant neutrophils, hb 11.3 RDW index 17.5   K 3.4 Glucose 106 AST 44 ALK po4 146  Lipase 2483  CT A/P : Mild, acute interstitial edematous pancreatitis in the pancreatic tail.  No discrete fluid collections. Edematous gallbladder wall and increased enhancement. The finding may be   secondary the presence of inflammation in the pancreas and  versus acute  cholecystitis. Recommend right upper quadrant abdominal ultrasound for  further evaluation and correlation.  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    Focus of air in the bladder. Correlate with instrumentation versus   infection. Correlate with urinalysis.    CXR : Intrathoracic gastric fundal and body herniation. Please see lower chest images of abdominal CT scan 8/22/2023. No airspace consolidation.    RUQ U/S Echogenicity in the region of the gallbladder neck suggests small stones versus gravel. Gallbladder wall thickening identified measuring 7-8 mm. Trace pericholecystic fluid. Correlate for cholecystitis.  s/ p  1X iv Tylenol zosyn, potassium 40mg X1,  2L NS in ER   EKG shows NSR 87 bpm QTc 462  (22 Aug 2023 20:04)      ---  HOSPITAL COURSE: Patient admitted for pancreatitis. CT A/P showed Mild, acute interstitial edematous pancreatitis in the pancreatic tail. RUQ U/S showed echogenicity in the region of the gallbladder neck suggests small stones versus gravel. Gallbladder wall thickening identified measuring 7-8 mm. Trace pericholecystic fluid. Started on IVF NS @100cc/hr.   GI was consulted, recommended MRCP which showed mid to distal choledocholithiasis without CBD distension.   ****Patient had ERCP and cholecystectomy on 8/25 without complication. ****  ID was consulted and patient was started on IV antibiotics. Patient switched from zosyn to meropenem because of diarrhea. GI PCR was positive for EPEC, patient switched back to IV zosyn and started on IV azithromycin.   Patient also with L Leg cellulitis. Blood cx _____, Urine Cx____. B/L LE doppler was negative for any acute dvt.   Incidental finding of splenic artery aneurysm on CT a/p. Vasc recommend outpatient follow up.       Patient's clinical condition improved throughout hospital course and patient is stable for discharge *** with close outpatient follow up.     ---  CONSULTANTS:   ID: Dr. Ne Kwok  GI: Dr. Mandujano  Vasc: Dr. Escalona   Surgery: Dr. Dawn    ---  Physical Exam on the day of discharge: Refer to progress note from discharge date    ---  TIME SPENT:  I, the attending physician, was physically present for the key portions of the evaluation and management (E/M) service provided. The total amount of time spent reviewing the hospital notes, laboratory values, imaging findings, assessing/counseling the patient, discussing with consultant physicians, social work, nursing staff was -- minutes    ---  Primary care provider was made aware of plan for discharge:      [  ] NO     [  ] YES HPI:  82 y/o F w/ PMHx of HLD, osteoarthritis, GERD, depression/anxiety, varicose veins, B/L TKR  presents w/ sudden onset 10/10 upper abdominal pain since Sunday evening, pain radiation to bilateral lower back & bilateral shoulders. States  the pain was accompanied w/ nausea and several episodes of emesis that same night. She has had not appetite since then and has not eaten since Sunday night. Admits to chills, weakness, fatigue, nausea, abd bloating and several episodes of diarrhea since arrival to the ED. Denies headaches, chest pain, palpitations, shortness of breath, acid reflux, vomiting or urinary sx. Reports no past hx of pancreatitis. No hx of cancer.   Last colonoscopy was around 5 years ago, family hx of Crohn's - sister and granddaughter. Denies any recent alcohol use, drug abuse.     IN ED,  VITALS /69 HR 94 RR 20 Temp 98F on RA   PERTINENT LABS: Wbc 12k 82% predominant neutrophils, hb 11.3 RDW index 17.5   K 3.4 Glucose 106 AST 44 ALK po4 146  Lipase 2483  CT A/P : Mild, acute interstitial edematous pancreatitis in the pancreatic tail.  No discrete fluid collections. Edematous gallbladder wall and increased enhancement. The finding may be   secondary the presence of inflammation in the pancreas and  versus acute  cholecystitis. Recommend right upper quadrant abdominal ultrasound for  further evaluation and correlation.  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    Focus of air in the bladder. Correlate with instrumentation versus   infection. Correlate with urinalysis.    CXR : Intrathoracic gastric fundal and body herniation. Please see lower chest images of abdominal CT scan 8/22/2023. No airspace consolidation.    RUQ U/S Echogenicity in the region of the gallbladder neck suggests small stones versus gravel. Gallbladder wall thickening identified measuring 7-8 mm. Trace pericholecystic fluid. Correlate for cholecystitis.  s/ p  1X iv Tylenol zosyn, potassium 40mg X1,  2L NS in ER   EKG shows NSR 87 bpm QTc 462  (22 Aug 2023 20:04)      ---  HOSPITAL COURSE: Patient admitted for pancreatitis. CT A/P showed Mild, acute interstitial edematous pancreatitis in the pancreatic tail. RUQ U/S showed echogenicity in the region of the gallbladder neck suggests small stones versus gravel. Gallbladder wall thickening identified measuring 7-8 mm. Trace pericholecystic fluid. Started on IVF NS @100cc/hr.   GI was consulted, recommended MRCP which showed mid to distal choledocholithiasis without CBD distension.   Patient had ERCP and cholecystectomy on 8/25 without complication.   ID was consulted and patient was started on IV antibiotics. Patient switched from zosyn to meropenem because of diarrhea. GI PCR was positive for EPEC, patient switched back to IV zosyn and given 1000mg azithromycin x1.   Patient also with L Leg cellulitis. Blood cx Negative, Urine Cx positive for Ecoli. B/L LE doppler was negative for any acute dvt.   Incidental finding of splenic artery aneurysm on CT a/p. Vasc recommend outpatient follow up.       Patient's clinical condition improved throughout hospital course and patient is stable for discharge *** with close outpatient follow up.     ---  CONSULTANTS:   ID: Dr. Ne Kwok  GI: Dr. Mandjuano  Vasc: Dr. Escalona   Surgery: Dr. Dawn    ---  Physical Exam on the day of discharge: Refer to progress note from discharge date    ---  TIME SPENT:  I, the attending physician, was physically present for the key portions of the evaluation and management (E/M) service provided. The total amount of time spent reviewing the hospital notes, laboratory values, imaging findings, assessing/counseling the patient, discussing with consultant physicians, social work, nursing staff was -- minutes    ---  Primary care provider was made aware of plan for discharge:      [  ] NO     [  ] YES HPI:  84 y/o F w/ PMHx of HLD, osteoarthritis, GERD, depression/anxiety, varicose veins, B/L TKR  presents w/ sudden onset 10/10 upper abdominal pain since Sunday evening, pain radiation to bilateral lower back & bilateral shoulders. States  the pain was accompanied w/ nausea and several episodes of emesis that same night. She has had not appetite since then and has not eaten since Sunday night. Admits to chills, weakness, fatigue, nausea, abd bloating and several episodes of diarrhea since arrival to the ED. Denies headaches, chest pain, palpitations, shortness of breath, acid reflux, vomiting or urinary sx. Reports no past hx of pancreatitis. No hx of cancer.   Last colonoscopy was around 5 years ago, family hx of Crohn's - sister and granddaughter. Denies any recent alcohol use, drug abuse.     IN ED,  VITALS /69 HR 94 RR 20 Temp 98F on RA   PERTINENT LABS: Wbc 12k 82% predominant neutrophils, hb 11.3 RDW index 17.5   K 3.4 Glucose 106 AST 44 ALK po4 146  Lipase 2483  CT A/P : Mild, acute interstitial edematous pancreatitis in the pancreatic tail.  No discrete fluid collections. Edematous gallbladder wall and increased enhancement. The finding may be   secondary the presence of inflammation in the pancreas and  versus acute  cholecystitis. Recommend right upper quadrant abdominal ultrasound for  further evaluation and correlation.  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    Focus of air in the bladder. Correlate with instrumentation versus   infection. Correlate with urinalysis.    CXR : Intrathoracic gastric fundal and body herniation. Please see lower chest images of abdominal CT scan 8/22/2023. No airspace consolidation.    RUQ U/S Echogenicity in the region of the gallbladder neck suggests small stones versus gravel. Gallbladder wall thickening identified measuring 7-8 mm. Trace pericholecystic fluid. Correlate for cholecystitis.  s/ p  1X iv Tylenol zosyn, potassium 40mg X1,  2L NS in ER   EKG shows NSR 87 bpm QTc 462  (22 Aug 2023 20:04)      ---  HOSPITAL COURSE: Patient admitted for pancreatitis. CT A/P showed Mild, acute interstitial edematous pancreatitis in the pancreatic tail. RUQ U/S showed echogenicity in the region of the gallbladder neck suggests small stones versus gravel. Gallbladder wall thickening identified measuring 7-8 mm. Trace pericholecystic fluid. Started on IVF NS @100cc/hr.  GI Dr Mandujano   & Sx -Dr Dawn on Case , ID Dr AMBER Kwok saw pt, Abx continued   GI was consulted, recommended MRCP which showed mid to distal choledocholithiasis without CBD distension.   Patient had ERCP with Distal CBD stone removal and cholecystectomy on 8/25 without complication. with Dr Dawn    ID was consulted and patient was started on IV antibiotics. Patient switched from zosyn to meropenem because of diarrhea. GI PCR was positive for EPEC, patient switched back to IV zosyn and given 1000mg azithromycin x1.  IV Zosyn continued post op for Ac [place] . Vascular Saw pt -No intervention ? small are of phlebitis on Post left calf   Patient also with L Leg cellulitis. Blood cx Negative, Urine Cx positive for Ecoli. B/L LE doppler was negative x 2  for any acute dvt. Diet advsnced , pt tolerated well,   Incidental finding of splenic artery aneurysm on CT a/p. Vasc recommend outpatient follow up. NO PT need,   Pt ambulating well, stable for d/c home  .      Patient's clinical condition improved throughout hospital course and patient is stable for discharge  with close outpatient follow up.     ---  CONSULTANTS:   ID: Dr. Ne Kwok  GI: Dr. Mandjuano  Vasc: Dr. Escalona   Surgery: Dr. Dawn    ---  Physical Exam on the day of discharge: Refer to progress note from discharge date    ---  TIME SPENT:  I, the attending physician, was physically present for the key portions of the evaluation and management (E/M) service provided. The total amount of time spent reviewing the hospital notes, laboratory values, imaging findings, assessing/counseling the patient, discussing with consultant physicians, social work, nursing staff was  minutes    ---  Primary care provider was made aware of plan for discharge:      [  ] NO     [ x ] YES HPI:  82 y/o F w/ PMHx of HLD, osteoarthritis, GERD, depression/anxiety, varicose veins, B/L TKR  presents w/ sudden onset 10/10 upper abdominal pain since Sunday evening, pain radiation to bilateral lower back & bilateral shoulders. States  the pain was accompanied w/ nausea and several episodes of emesis that same night. She has had not appetite since then and has not eaten since Sunday night. Admits to chills, weakness, fatigue, nausea, abd bloating and several episodes of diarrhea since arrival to the ED. Denies headaches, chest pain, palpitations, shortness of breath, acid reflux, vomiting or urinary sx. Reports no past hx of pancreatitis. No hx of cancer.   Last colonoscopy was around 5 years ago, family hx of Crohn's - sister and granddaughter. Denies any recent alcohol use, drug abuse.     IN ED,  VITALS /69 HR 94 RR 20 Temp 98F on RA   PERTINENT LABS: Wbc 12k 82% predominant neutrophils, hb 11.3 RDW index 17.5   K 3.4 Glucose 106 AST 44 ALK po4 146  Lipase 2483  CT A/P : Mild, acute interstitial edematous pancreatitis in the pancreatic tail.  No discrete fluid collections. Edematous gallbladder wall and increased enhancement. The finding may be   secondary the presence of inflammation in the pancreas and  versus acute  cholecystitis. Recommend right upper quadrant abdominal ultrasound for  further evaluation and correlation.  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    Focus of air in the bladder. Correlate with instrumentation versus   infection. Correlate with urinalysis.    CXR : Intrathoracic gastric fundal and body herniation. Please see lower chest images of abdominal CT scan 8/22/2023. No airspace consolidation.    RUQ U/S Echogenicity in the region of the gallbladder neck suggests small stones versus gravel. Gallbladder wall thickening identified measuring 7-8 mm. Trace pericholecystic fluid. Correlate for cholecystitis.  s/ p  1X iv Tylenol zosyn, potassium 40mg X1,  2L NS in ER   EKG shows NSR 87 bpm QTc 462  (22 Aug 2023 20:04)      ---  HOSPITAL COURSE: Patient admitted for pancreatitis. CT A/P showed Mild, acute interstitial edematous pancreatitis in the pancreatic tail. RUQ U/S showed echogenicity in the region of the gallbladder neck suggests small stones versus gravel. Gallbladder wall thickening identified measuring 7-8 mm. Trace pericholecystic fluid. Started on IVF NS @100cc/hr.  GI Dr Mandujano   & Sx -Dr Dawn on Case , ID Dr AMBER Kwok saw pt, Abx continued   GI was consulted, recommended MRCP which showed mid to distal choledocholithiasis without CBD distension.   Patient had ERCP with Distal CBD stone removal and cholecystectomy on 8/25 without complication. with Dr Dawn    ID was consulted and patient was started on IV antibiotics. Patient switched from zosyn to meropenem because of diarrhea. GI PCR was positive for EPEC, patient switched back to IV zosyn and given 1000mg azithromycin x1.  IV Zosyn continued post op for Ac Shama . Vascular Saw pt -No intervention ? small are of phlebitis on Post left calf   Patient also with L Leg cellulitis. Blood cx Negative, Urine Cx positive for Ecoli. B/L LE doppler was negative x 2  for any acute dvt. Diet advanced , pt tolerated well,   Incidental finding of splenic artery aneurysm on CT a/p. Vasc recommend outpatient follow up. NO PT need,   Pt ambulating well, stable for d/c home  .      Patient's clinical condition improved throughout hospital course and patient is stable for discharge  with close outpatient follow up.     ---  CONSULTANTS:   ID: Dr. Ne Kwok  GI: Dr. Mandujano  Vasc: Dr. Escalona   Surgery: Dr. Dawn    ---  Physical Exam on the day of discharge: Refer to progress note from discharge date    ---  TIME SPENT:  I, the attending physician, was physically present for the key portions of the evaluation and management (E/M) service provided. The total amount of time spent reviewing the hospital notes, laboratory values, imaging findings, assessing/counseling the patient, discussing with consultant physicians, social work, nursing staff was  minutes    ---  Primary care provider was made aware of plan for discharge:      [  ] NO     [ x ] YES

## 2023-08-23 NOTE — DISCHARGE NOTE PROVIDER - NSDCACTIVITY_GEN_ALL_CORE
No heavy lifting/straining Bathing allowed/Walking - Indoors allowed/No heavy lifting/straining/Walking - Outdoors allowed

## 2023-08-23 NOTE — PROGRESS NOTE ADULT - PROBLEM SELECTOR PLAN 2
- RUQ U/S Echogenicity in the region of the gallbladder neck suggests small stones versus gravel. Gallbladder wall thickening identified measuring 7-8 mm. Trace pericholecystic fluid. Correlate for cholecystitis.  - Plan for Lap virgil after stabilization  -IV Zosyn q 8 switched to meropenem as patient has had >8 BMs in 24 hours   - f/u MRCP  Surgery (Dr xavier) consulted  GI(Dr Marques) consulted  Pain meds  NPO, IVF Ac Cholecystitis with Cholelithiasis   -- RUQ U/S Echogenicity in the region of the gallbladder neck suggests small stones versus gravel. Gallbladder wall thickening identified measuring 7-8 mm. Trace pericholecystic fluid. Correlate for cholecystitis.  - Plan for Lap virgil after stabilization  -IV Zosyn q 8 switched to  IV meropenem 1 gm q 8 hrs  as patient has had >8 BMs in 24 hours   -  MRCP + AC Cholecystitis   Surgery (Dr xavier)  d/w -OR on Friday   GI(Dr Mandujano D/W -ERCP for CBD stone on Friday   Pain meds  -, IVF, D/W Sx -Ok to start Clear liquid

## 2023-08-23 NOTE — PROGRESS NOTE ADULT - ASSESSMENT
84 y/o F w/ PMHx of HLD, osteoarthritis, GERD, depression/anxiety, varicose veins ,Left  TKR  presents w/ sudden onset 10/10 upper abdominal pain since Sunday evening, pain radiation to bilateral lower back & bilateral shoulders. CT A/P : Mild, acute interstitial edematous pancreatitis in the pancreatic tail. CXR : Intrathoracic gastric fundal and body herniation ..RUQ U/S Echogenicity in the region of the gallbladder neck suggests small stones versus gravel. Gallbladder wall thickening identified measuring 7-8 mm. Trace pericholecystic fluid. Correlate for cholecystitis. Admitted for Gall stone  edematous pancreatitis & AC  Cholecystitis, Cholelithiasis  and Intrathoracic gastric fundal and body herniation

## 2023-08-23 NOTE — DISCHARGE NOTE PROVIDER - CARE PROVIDER_API CALL
Enoch Mandujano  Gastroenterology  [address] [place] [address]  Phone: [phone]  Fax: [phone]  Follow Up Time: 1 week    Katiana Summers  Vascular Surgery  [address] [place] [id number]  Phone: [phone]  Fax: [phone]  Follow Up Time: 1 week   Enoch Mandujano  Gastroenterology  [address] [place] [address]  Phone: [phone]  Fax: [phone]  Follow Up Time: 1 week    Katiana Summers  Vascular Surgery  [address] [place] [id number]  Phone: [phone]  Fax: [phone]  Follow Up Time: 1 week    Hadley Dawn Emerson  Surgery  [address] [place] [address]  Phone: [phone]  Fax: [phone]  Follow Up Time: 1 week    Ne Kwok  Infectious Disease  [address] [facility], [address]  [place] [address]  Phone: [phone]  Fax: [phone]  Follow Up Time: 1 week   Enoch Mandujano  Gastroenterology  [address] [place] [address]  Phone: [phone]  Fax: [phone]  Follow Up Time: 1 week    Katiana Summers  Vascular Surgery  [address] [place] [address]  Phone: [phone]  Fax: [phone]  Follow Up Time: 1 week    Hadley Dawn Emerson  Surgery  [address] [place] [address]  Phone: [phone]  Fax: [phone]  Follow Up Time: 1 week    Ne Kwok  Infectious Disease  [address] [facility], [address]  [place] [address]  Phone: [phone]  Fax: [phone]  Follow Up Time: 1 week    Uriah Delacruz  Internal Medicine  [address] [place] [address]  Phone: [phone]  Fax: [phone]  Follow Up Time: 1 week

## 2023-08-23 NOTE — DISCHARGE NOTE PROVIDER - NSDCFUADDINST_GEN_ALL_CORE_FT
Follow up with PMD- DR Delacruz in 1 week  Follow up with GI-DR Mandujano in 2 weeks  Follow up with Sx- DR Dawn in 1week   Follow up with Vascular -Dr Rich in 1-2 weeks  Follow up with PMD- DR Delacruz in 1 week-Repeat CBC, CMP, Liver Function test  blood test   Follow up with GI-DR Mandujano in 2 weeks  Follow up with Sx- DR Dawn in 1week   Follow up with Vascular -Dr Rich in 1-2 weeks  Follow up with PMD- DR Delacruz in 1 week-Repeat CBC, CMP, Liver Function test  blood test   Follow up with GI-DR Mandujano in 2 weeks  Follow up with Sx- DR Dawn in 1week   Follow up with Vascular -Dr Rich in 1-2 weeks     Keep wounds clean and dry, leave wound glue in place.  You may shower in 24 hours.  Do not let water hit wounds directly, do not rub or pick at incisions.  Pat wounds dry with a towel after showering.     No heavy lifting (nothing heavier than 5 lbs.), no strenuous physical activity, no exercise.      You may perform your usual daily tasks as tolerated.    Eat a low-fat diet for the next few weeks.            Do not drive for 24 hours following anesthesia.  Do not drive while taking narcotic pain meds.  Do not drive until pain-free.      For pain after surgery, take the followinmg Extra-Strength Tylenol - take 2 pills every 6 hours regularly for the first 3 days after surgery, then as needed.  DO NOT EXCEED 4,000MG OF ACETAMINOPHEN PER DAY.  If you still have pain despite the ibuprofen/tylenol combination, then take the oxycodone as prescribed.  Take colace as prescribed while taking narcotic pain medication to prevent constipation.    Follow-up with Dr. Dawn in his office next week - call office for appointment if not already made.

## 2023-08-23 NOTE — PROGRESS NOTE ADULT - ATTENDING COMMENTS
82 y/o F w/ PMHx of HLD, osteoarthritis, GERD, depression/anxiety, varicose veins ,Left  TKR  presents w/ sudden onset 10/10 upper abdominal pain since Sunday evening, pain radiation to bilateral lower back & bilateral shoulders. CT A/P : Mild, acute interstitial edematous pancreatitis in the pancreatic tail. CXR : Intrathoracic gastric fundal and body herniation ..RUQ U/S Echogenicity in the region of the gallbladder neck suggests small stones versus gravel. Gallbladder wall thickening identified measuring 7-8 mm. Trace pericholecystic fluid. Correlate for cholecystitis. Admitted for Gall stone  edematous pancreatitis & AC  Cholecystitis, Cholelithiasis  and Intrathoracic gastric fundal and body herniation  Pt seen, Examined, Case & care plan d/w pt, residents at detail.  AM labs   NPO,---> Clears   IV Abx ,IVF   Consults:  GI- Dr Mandujano D/W   ID-DR AMBER Clarke D/W OR Friday   D/W Pt & family at Detail.  Total care time is 60 minutes..

## 2023-08-23 NOTE — DISCHARGE NOTE PROVIDER - NSDCCPCAREPLAN_GEN_ALL_CORE_FT
PRINCIPAL DISCHARGE DIAGNOSIS  Diagnosis: Pancreatitis  Assessment and Plan of Treatment: CT abd/pelvis showed evidence for inflammation of your pancreas.    You were given IV fluids and pain medication as needed. Diet was advanced as tolerated.   GI was consulted and recommended MRCP which showed _______.  Please follow up with GI within 1 week of discharge.        SECONDARY DISCHARGE DIAGNOSES  Diagnosis: Cholecystitis  Assessment and Plan of Treatment: Ultradound of right upper quadrant showed echogenicity in the region of the gallbladder neck suggests small stones versus gravel. Gallbladder wall thickening identified measuring 7-8 mm. Trace pericholecystic fluid.   Surgery recommended _____.  ID recommended IV antibiotics.       Diagnosis: Left leg cellulitis  Assessment and Plan of Treatment: You had an infection of your left leg. You were treated with IV antibiotics.  Ultrasound of bilateral lower legs was negative.    Diagnosis: Abnormal finding on CT scan  Assessment and Plan of Treatment: Splenic artery aneurysm near completely peripherally calcified, 1.1cm was seen on CT abd/pelvis.  Vasc was consulted and recommended outpatient follow up.    Diagnosis: Anxiety and depression  Assessment and Plan of Treatment: Continue home lexapro and trileptal.  Continue to follow with your outpatient psychiatrist.     PRINCIPAL DISCHARGE DIAGNOSIS  Diagnosis: Cholecystitis  Assessment and Plan of Treatment: -You came with abdominal Pain found to have Ac Cholecystitis & Cholelithiasis  on CT Scan & MRCP -  -Ultradound of right upper quadrant showed echogenicity in the region of the gallbladder neck suggests small stones versus gravel.   Gallbladder wall thickening identified measuring 7-8 mm. Trace pericholecystic fluid.   -ID recommended IV antibiotics.   Surgery DR xavier saw pt -you were given IV antibioptics   You Had Cholecysyectomy done.  Continue Incentive spirometry   -Do NOT lift heavy weight  Follow up with DR xavier in 1week   -Low Fat diet        SECONDARY DISCHARGE DIAGNOSES  Diagnosis: Pancreatitis  Assessment and Plan of Treatment: CT abd/pelvis showed evidence for inflammation of your pancreas.    You were given IV fluids and pain medication as needed.   -GI was consulted Dr Mandujano   -Gall stone pancreatitis , MRCP showed Distal CBD stone  S/P ERCP with stone removal -Improved   Please follow up with GI within 1-2  week of discharge.      Diagnosis: Abnormal finding on CT scan  Assessment and Plan of Treatment: Splenic artery aneurysm near completely peripherally calcified, 1.1cm was seen on CT abd/pelvis.  Vasc - Dr Rubio was consulted and recommended outpatient follow up.    Diagnosis: Left leg cellulitis  Assessment and Plan of Treatment: You had an infection of your left leg.   -You were treated with IV antibiotics.  Ultrasound of bilateral lower legs was negative for DVT .  Apply LAC Hydrine Lotion on LL Ext   Fiollow up with Vascular MD & Your Orthopedic MD as outv pt    Diagnosis: Diarrhea  Assessment and Plan of Treatment: GI PCR - EPEC -E Coli -Treated with 1 dose of Zithromax by ID   -Resolved    Diagnosis: Anxiety and depression  Assessment and Plan of Treatment: Continue home lexapro and trileptal.  Continue to follow with your outpatient psychiatrist.     PRINCIPAL DISCHARGE DIAGNOSIS  Diagnosis: Cholecystitis  Assessment and Plan of Treatment: -You came with abdominal Pain found to have Ac Cholecystitis & Cholelithiasis  on CT Scan & MRCP -  -Ultradound of right upper quadrant showed echogenicity in the region of the gallbladder neck suggests small stones versus gravel.   Gallbladder wall thickening identified measuring 7-8 mm. Trace pericholecystic fluid.   -ID recommended IV antibiotics.   Surgery DR xavier saw pt -you were given IV antibioptics   You Had Cholecysyectomy done.  Continue Incentive spirometry   -Do NOT lift heavy weight  Follow up with DR xavier in 1week   -Low Fat diet        SECONDARY DISCHARGE DIAGNOSES  Diagnosis: Pancreatitis  Assessment and Plan of Treatment: CT abd/pelvis showed evidence for inflammation of your pancreas.    You were given IV fluids and pain medication as needed.   -MECP done - Mild peripancreatic inflammatory changes, which may reflect acute pancreatitis. Cystic focus of the pancreatic body measuring up to 8 mm, image 23 series 3 may represent IPMN or sequelae of pancreatitis. Moderate Hiatal hernia   -GI was consulted Dr Mandujano   -Gall stone pancreatitis , MRCP showed Distal CBD stone  S/P ERCP with stone removal -Improved   --Continue Protonix daily   Please follow up with GI within 1-2  week of discharge. for Pancreatitis & Hiatal Hernia       Diagnosis: Left leg cellulitis  Assessment and Plan of Treatment: You had an infection of your left leg.   -You were treated with IV antibiotics.  Ultrasound of bilateral lower legs was negative for DVT .  Apply LAC Hydrine Lotion on LL Ext   Fiollow up with Vascular MD & Your Orthopedic MD as outv pt    Diagnosis: Mild anemia  Assessment and Plan of Treatment: 2/2 Ac Shama & AC Pancreatitis , IV Fluids  Reperat CBC in 1week with Dr Delacruz    Diagnosis: Anxiety and depression  Assessment and Plan of Treatment: Continue home lexapro and trileptal.  Continue to follow with your outpatient psychiatrist.    Diagnosis: Abnormal finding on CT scan  Assessment and Plan of Treatment: Splenic artery aneurysm near completely peripherally calcified, 1.1cm was seen on CT abd/pelvis.  Vasc - Dr Rubio was consulted and recommended outpatient follow up.    Diagnosis: Diarrhea  Assessment and Plan of Treatment: GI PCR - EPEC -E Coli -Treated with 1 dose of Zithromax by ID   -Resolved    Diagnosis: Renal cyst  Assessment and Plan of Treatment: Incidental findings on MRCP  -KIDNEYS/URETERS: No hydronephrosis. Renal cysts, few which are   proteinaceous/hemorrhagic.  -Out pt follow up -Renal Ultrasound in 4 -8 weeks        PRINCIPAL DISCHARGE DIAGNOSIS  Diagnosis: Cholecystitis  Assessment and Plan of Treatment: -You came with abdominal Pain found to have Ac Cholecystitis & Cholelithiasis  on CT Scan & MRCP -  -Ultradound of right upper quadrant showed echogenicity in the region of the gallbladder neck suggests small stones versus gravel.   Gallbladder wall thickening identified measuring 7-8 mm. Trace pericholecystic fluid.   -ID recommended IV antibiotics.   Surgery DR xavier saw pt -you were given IV antibioptics   You Had Cholecysyectomy done.  Continue Incentive spirometry   -Do NOT lift heavy weight  Follow up with DR xavier in 1week   -Low Fat diet        SECONDARY DISCHARGE DIAGNOSES  Diagnosis: Abnormal finding on CT scan  Assessment and Plan of Treatment: Splenic artery aneurysm near completely peripherally calcified, 1.1cm was seen on CT abd/pelvis.  Vasc - Dr Rubio was consulted and recommended outpatient follow up.    Diagnosis: Left leg cellulitis  Assessment and Plan of Treatment: You had an infection of your left leg.   -You were treated with IV antibiotics.  Please start  Keflex 500mg every 6 hours for 7 days    Ultrasound of bilateral lower legs was negative for DVT .  Apply LAC Hydrine Lotion on LL Ext   Follow up with Dr. Ne Kwok (Infectious Disease) in 1 week  Follow up with Vascular MD & Your Orthopedic MD as outv pt    Diagnosis: Anxiety and depression  Assessment and Plan of Treatment: Continue home lexapro and trileptal.  Continue to follow with your outpatient psychiatrist.    Diagnosis: Pancreatitis  Assessment and Plan of Treatment: CT abd/pelvis showed evidence for inflammation of your pancreas.    You were given IV fluids and pain medication as needed.   -MECP done - Mild peripancreatic inflammatory changes, which may reflect acute pancreatitis. Cystic focus of the pancreatic body measuring up to 8 mm, image 23 series 3 may represent IPMN or sequelae of pancreatitis. Moderate Hiatal hernia   -GI was consulted Dr Mandujano   -Gall stone pancreatitis , MRCP showed Distal CBD stone  S/P ERCP with stone removal -Improved   --Continue Protonix daily   Please follow up with GI within 1-2  week of discharge. for Pancreatitis & Hiatal Hernia       Diagnosis: Diarrhea  Assessment and Plan of Treatment: GI PCR - EPEC -E Coli -Treated with 1 dose of Zithromax by ID   -Resolved    Diagnosis: Mild anemia  Assessment and Plan of Treatment: 2/2 Ac Shama & AC Pancreatitis , IV Fluids  Reperat CBC in 1week with Dr Delacruz    Diagnosis: Renal cyst  Assessment and Plan of Treatment: Incidental findings on MRCP  -KIDNEYS/URETERS: No hydronephrosis. Renal cysts, few which are   proteinaceous/hemorrhagic.  -Out pt follow up -Renal Ultrasound in 4 -8 weeks

## 2023-08-23 NOTE — CONSULT NOTE ADULT - SUBJECTIVE AND OBJECTIVE BOX
Surgery Consult Note:    CC: Patient is a 83y old  Female who presents with a chief complaint of cholecystitis, acute interstitial edematous pancreatitis.  Intrathoracic gastric fundal and body herniation (23 Aug 2023 06:52)    Interval HPI: 84 y/o female w/ PMHx of HLD, osteoarthritis, GERD, depression/anxiety, varicose veins presents w/ sudden onset 10/10 upper abdominal pain since  evening, with radiation to bilateral lower back & bilateral shoulders. She states the pain was accompanied w/ nausea and several episodes of emesis that same night. She has had not appetite since then and has not eaten since  night. Admits to chills, weakness, fatigue, nausea, abd bloating and several episodes of diarrhea since arrival to the ED. Denies headaches, chest pain, palpitations, shortness of breath, acid reflux, vomiting or urinary sx.  Last colonoscopy was around 5 years ago, pt was told she does not have to return for another, was not notified of any abnormal findings.  Reports a family hx of Crohn's - sister and granddaughter.    Past Medical & Surgical History:  Anxiety      Depression      Hyperlipidemia      GERD (gastroesophageal reflux disease)      History of vascular disease  venous; has swelling      Osteoarthritis of left knee      HLD (hyperlipidemia)      S/P foot surgery, right  right great toe surgery      S/P wrist surgery  right      S/P tonsillectomy      S/P cataract surgery  bilateral      S/P total knee replacement, left    ROS:  As reviewed in HPI, remaining ROS negative    Medications:  Home Medications:  celecoxib 200 mg oral capsule: 1 cap(s) orally every 12 hours (22 Aug 2023 19:52)  Crestor 40 mg oral tablet: 1 tab(s) orally once a day (at bedtime) (22 Aug 2023 19:52)  Ecotrin Adult Low Strength 81 mg oral delayed release tablet: 1 tab(s) orally every 12 hours (22 Aug 2023 19:52)  Lexapro 20 mg oral tablet: 1 tab(s) orally once a day (22 Aug 2023 19:52)  pantoprazole 40 mg oral delayed release tablet: 1 tab(s) orally once a day (before a meal) (22 Aug 2023 19:52)  Trileptal 300 mg oral tablet: 1 tab(s) orally 2 times a day (22 Aug 2023 19:52)  Vitamin D3 1000 intl units (25 mcg) oral tablet: 1 tab(s) orally once a day (22 Aug 2023 19:52)    MEDICATIONS  (STANDING):  aspirin enteric coated 81 milliGRAM(s) Oral daily  atorvastatin 80 milliGRAM(s) Oral at bedtime  cholecalciferol 1000 Unit(s) Oral daily  enoxaparin Injectable 40 milliGRAM(s) SubCutaneous every 24 hours  escitalopram 20 milliGRAM(s) Oral daily  OXcarbazepine 300 milliGRAM(s) Oral two times a day  pantoprazole    Tablet 40 milliGRAM(s) Oral before breakfast  piperacillin/tazobactam IVPB.. 3.375 Gram(s) IV Intermittent every 8 hours  sodium chloride 0.9%. 1000 milliLiter(s) (100 mL/Hr) IV Continuous <Continuous>    MEDICATIONS  (PRN):  acetaminophen     Tablet .. 650 milliGRAM(s) Oral every 6 hours PRN Temp greater or equal to 38C (100.4F), Mild Pain (1 - 3)  melatonin 3 milliGRAM(s) Oral at bedtime PRN Insomnia  ondansetron Injectable 4 milliGRAM(s) IV Push every 8 hours PRN Nausea and/or Vomiting    Allergies:  ALLERGIES: adhesives (Pruritus; Rash)  No Known Drug Allergies    INTOLERANCES: None, unless indicated below  lactose (Diarrhea)    Social History:  Tobacco:  denies  EtOH: denies   Recreational drug use: denies   Lives with: family   Ambulates: with walker (22 Aug 2023 20:04)    Family History:  Family history of vascular disease  sister   Family history- stomach cancer  father     Vitals:  Vital Signs Last 24 Hrs  T(C): 36.7 (23 Aug 2023 05:28), Max: 36.9 (22 Aug 2023 19:31)  T(F): 98 (23 Aug 2023 05:28), Max: 98.4 (22 Aug 2023 19:31)  HR: 90 (23 Aug 2023 05:28) (88 - 108)  BP: 125/76 (23 Aug 2023 05:28) (111/72 - 130/79)  BP(mean): --  RR: 18 (23 Aug 2023 05:28) (18 - 20)  SpO2: 94% (23 Aug 2023 05:28) (94% - 100%)    Parameters below as of 23 Aug 2023 05:28  Patient On (Oxygen Delivery Method): room air    Physical Exam:  General: no acute distress, appears comfortable, well nourished, well-groomed  HEENT: normocephalic/atraumatic, vision grossly intact, hearing grossly intact, no nasal discharge, ears & nose symmetrical and atraumatic  Neck: supple  Chest: lungs clear to auscultation bilaterally, good inspiratory effort  Heart: heart rate and rhythm regular  Abdomen: soft, nondistended, generalized tenderness to palpation RUQ/RLQ; no guarding, rebound tenderness, no freire's sign  Extremities: no gross deformities, able to move all four extremities, no edema  Neuro: alert & oriented x3, motor and sensory grossly intact  Skin: warm, dry, good turgor; no gross lesions, no eruptions, rashes or jaundice    Labs:                        11.3   12.28 )-----------( 267      ( 22 Aug 2023 09:58 )             35.4         139  |  105  |  10  ----------------------------<  106<H>  3.4<L>   |  26  |  0.59    Ca    9.1      22 Aug 2023 09:58    TPro  7.6  /  Alb  3.4  /  TBili  0.4  /  DBili  x   /  AST  44<H>  /  ALT  38  /  AlkPhos  146<H>        Urinalysis Basic - ( 23 Aug 2023 06:00 )    Color: Yellow / Appearance: Cloudy / S.023 / pH: x  Gluc: x / Ketone: 15  / Bili: Negative / Urobili: 0.2   Blood: x / Protein: 30 / Nitrite: Negative   Leuk Esterase: Large / RBC: 5 /HPF / WBC Too Numerous to count /HPF   Sq Epi: x / Non Sq Epi: x / Bacteria: x    LIVER FUNCTIONS - ( 22 Aug 2023 09:58 )  Alb: 3.4 g/dL / Pro: 7.6 g/dL / ALK PHOS: 146 U/L / ALT: 38 U/L / AST: 44 U/L / GGT: x           Radiology & Additional Studies:  < from: CT Abdomen and Pelvis w/ IV Cont (23 @ 12:00) >    ACC: [id number] EXAM:  CT ABDOMEN AND PELVIS IC   ORDERED BY: LIUDMILA DALTON     PROCEDURE DATE:  2023    INTERPRETATION:  CLINICAL INFORMATION: Right lower quadrant abdominal pain    COMPARISON: None.    CONTRAST/COMPLICATIONS:  IV Contrast: Omnipaque 350  90 cc administered   10 cc discarded  Oral Contrast: NONE  Complications: None reported at time of study completion    PROCEDURE:  CT of the Abdomen and Pelvis was performed.  Sagittal and coronal reformats were performed.    FINDINGS:  LOWER CHEST: Bibasilar atelectasis.    LIVER: Fatty changes in segment 4 of the liver.  BILE DUCTS: Normal caliber.  GALLBLADDER: The gallbladder is mildly distended and measures 9.0 x 4.0 x   5.1 cm. there is edema in the gallbladder wall and mildly increased   enhancement. No pericholecystic stranding is seen.  SPLEEN: Within normal limits.  PANCREAS: The pancreas enhances homogeneously. There is mild edema within   and surrounding the pancreatic tail parenchyma. No discrete fluid   collections. No pancreatic duct dilatation.  ADRENALS: Within normal limits.  KIDNEYS/URETERS: Multiple subcentimeter hypodensities bilaterally which   are not fully characterized. Parapelvic cysts are noted. No   hydronephrosis. No nephroureterolithiasis.    BLADDER: There is a tiny focus of air in the lumen of the urinary bladder.  REPRODUCTIVE ORGANS: Uterus and adnexa within normal limits.    BOWEL: Moderate sized hiatal hernia. No obstruction. No bowel   obstruction. Appendix is normal. Scattered colonic diverticula are not   acutely inflamed.  PERITONEUM: No ascites. No pneumoperitoneum. No loculated fluid   collections.  VESSELS: The abdominal aorta has normal caliber with moderate   atherosclerotic changes. 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 SMA patent, MARK   diminutive and patent. The portal vein, splenic vein, and superior   mesenteric vein are patent. A 1.1 cm, almost circumferentially calcified   splenic artery aneurysm is noted in the splenic hilum.  RETROPERITONEUM/LYMPH NODES: No lymphadenopathy.  ABDOMINAL WALL: Small fat-containing umbilical hernia.  BONES: S-shaped scoliosis of the thoracolumbar spine. Discogenic   degenerative disease. Sclerotic focus in L4, likely benign.    IMPRESSION:  Mild, acute interstitial edematous pancreatitis in the pancreatic tail.   No discrete fluid collections.    Edematous gallbladder wall and increased enhancement. The finding may be   secondary the presence of inflammation inthe pancreas and  versus acute   cholecystitis. Recommend right upper quadrant abdominal ultrasound for   further evaluation and correlation.    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    Focus of air in the bladder. Correlate with instrumentation versus   infection. Correlate with urinalysis.    --- End of Report ---     EDITH DIAZ MD; Resident Radiologist  This document has been electronically signed.  MARCELLO HAWK MD; Attending Radiologist  This document has been electronically signed. Aug 22 2023  3:51PM    < end of copied text >     Surgery Consult Note:    CC: Patient is a 83y old  Female who presents with a chief complaint of cholecystitis, acute interstitial edematous pancreatitis.  Intrathoracic gastric fundal and body herniation (23 Aug 2023 06:52)    Interval HPI: 84 y/o female w/ PMHx of HLD, osteoarthritis, GERD, depression/anxiety, varicose veins presents w/ sudden onset 10/10 upper abdominal pain since  evening, with radiation to bilateral lower back & bilateral shoulders. She states the pain was accompanied w/ nausea and several episodes of emesis that same night. She has had not appetite since then and has not eaten since  night. Admits to chills, weakness, fatigue, nausea, abd bloating and several episodes of diarrhea since arrival to the ED. Denies headaches, chest pain, palpitations, shortness of breath, acid reflux, vomiting or urinary sx.  Last colonoscopy was around 5 years ago, pt was told she does not have to return for another, was not notified of any abnormal findings.  Reports a family hx of Crohn's - sister and granddaughter.    Past Medical & Surgical History:  Anxiety      Depression      Hyperlipidemia      GERD (gastroesophageal reflux disease)      History of vascular disease  venous; has swelling      Osteoarthritis of left knee      HLD (hyperlipidemia)      S/P foot surgery, right  right great toe surgery      S/P wrist surgery  right      S/P tonsillectomy      S/P cataract surgery  bilateral      S/P total knee replacement, left    ROS:  As reviewed in HPI, remaining ROS negative    Medications:  Home Medications:  celecoxib 200 mg oral capsule: 1 cap(s) orally every 12 hours (22 Aug 2023 19:52)  Crestor 40 mg oral tablet: 1 tab(s) orally once a day (at bedtime) (22 Aug 2023 19:52)  Ecotrin Adult Low Strength 81 mg oral delayed release tablet: 1 tab(s) orally every 12 hours (22 Aug 2023 19:52)  Lexapro 20 mg oral tablet: 1 tab(s) orally once a day (22 Aug 2023 19:52)  pantoprazole 40 mg oral delayed release tablet: 1 tab(s) orally once a day (before a meal) (22 Aug 2023 19:52)  Trileptal 300 mg oral tablet: 1 tab(s) orally 2 times a day (22 Aug 2023 19:52)  Vitamin D3 1000 intl units (25 mcg) oral tablet: 1 tab(s) orally once a day (22 Aug 2023 19:52)    MEDICATIONS  (STANDING):  aspirin enteric coated 81 milliGRAM(s) Oral daily  atorvastatin 80 milliGRAM(s) Oral at bedtime  cholecalciferol 1000 Unit(s) Oral daily  enoxaparin Injectable 40 milliGRAM(s) SubCutaneous every 24 hours  escitalopram 20 milliGRAM(s) Oral daily  OXcarbazepine 300 milliGRAM(s) Oral two times a day  pantoprazole    Tablet 40 milliGRAM(s) Oral before breakfast  piperacillin/tazobactam IVPB.. 3.375 Gram(s) IV Intermittent every 8 hours  sodium chloride 0.9%. 1000 milliLiter(s) (100 mL/Hr) IV Continuous <Continuous>    MEDICATIONS  (PRN):  acetaminophen     Tablet .. 650 milliGRAM(s) Oral every 6 hours PRN Temp greater or equal to 38C (100.4F), Mild Pain (1 - 3)  melatonin 3 milliGRAM(s) Oral at bedtime PRN Insomnia  ondansetron Injectable 4 milliGRAM(s) IV Push every 8 hours PRN Nausea and/or Vomiting    Allergies:  ALLERGIES: adhesives (Pruritus; Rash)  No Known Drug Allergies    INTOLERANCES: None, unless indicated below  lactose (Diarrhea)    Social History:  Tobacco:  denies  EtOH: denies   Recreational drug use: denies   Lives with: family   Ambulates: with walker (22 Aug 2023 20:04)    Family History:  Family history of vascular disease  sister   Family history- stomach cancer  father     Vitals:  Vital Signs Last 24 Hrs  T(C): 36.7 (23 Aug 2023 05:28), Max: 36.9 (22 Aug 2023 19:31)  T(F): 98 (23 Aug 2023 05:28), Max: 98.4 (22 Aug 2023 19:31)  HR: 90 (23 Aug 2023 05:28) (88 - 108)  BP: 125/76 (23 Aug 2023 05:28) (111/72 - 130/79)  BP(mean): --  RR: 18 (23 Aug 2023 05:28) (18 - 20)  SpO2: 94% (23 Aug 2023 05:28) (94% - 100%)    Parameters below as of 23 Aug 2023 05:28  Patient On (Oxygen Delivery Method): room air    Physical Exam:  General: no acute distress, appears comfortable, well nourished, well-groomed  HEENT: normocephalic/atraumatic, vision grossly intact, hearing grossly intact, no nasal discharge, ears & nose symmetrical and atraumatic  Neck: supple  Chest: lungs clear to auscultation bilaterally, good inspiratory effort  Heart: heart rate and rhythm regular  Abdomen: soft, nondistended, generalized tenderness to palpation RUQ/RLQ; no guarding, rebound tenderness, no freire's sign  Extremities: no gross deformities, able to move all four extremities, left leg - area erythema on anterior leg; small scab; (+) left calf tenderness  Neuro: alert & oriented x3, motor and sensory grossly intact  Skin: warm, dry, good turgor; no gross lesions, no eruptions, rashes or jaundice    Labs:                        11.3   12.28 )-----------( 267      ( 22 Aug 2023 09:58 )             35.4         139  |  105  |  10  ----------------------------<  106<H>  3.4<L>   |  26  |  0.59    Ca    9.1      22 Aug 2023 09:58    TPro  7.6  /  Alb  3.4  /  TBili  0.4  /  DBili  x   /  AST  44<H>  /  ALT  38  /  AlkPhos  146<H>        Urinalysis Basic - ( 23 Aug 2023 06:00 )    Color: Yellow / Appearance: Cloudy / S.023 / pH: x  Gluc: x / Ketone: 15  / Bili: Negative / Urobili: 0.2   Blood: x / Protein: 30 / Nitrite: Negative   Leuk Esterase: Large / RBC: 5 /HPF / WBC Too Numerous to count /HPF   Sq Epi: x / Non Sq Epi: x / Bacteria: x    LIVER FUNCTIONS - ( 22 Aug 2023 09:58 )  Alb: 3.4 g/dL / Pro: 7.6 g/dL / ALK PHOS: 146 U/L / ALT: 38 U/L / AST: 44 U/L / GGT: x           Radiology & Additional Studies:  < from: CT Abdomen and Pelvis w/ IV Cont (23 @ 12:00) >    ACC: [id number] EXAM:  CT ABDOMEN AND PELVIS IC   ORDERED BY: LIUDMILA DALTON     PROCEDURE DATE:  2023    INTERPRETATION:  CLINICAL INFORMATION: Right lower quadrant abdominal pain    COMPARISON: None.    CONTRAST/COMPLICATIONS:  IV Contrast: Omnipaque 350  90 cc administered   10 cc discarded  Oral Contrast: NONE  Complications: None reported at time of study completion    PROCEDURE:  CT of the Abdomen and Pelvis was performed.  Sagittal and coronal reformats were performed.    FINDINGS:  LOWER CHEST: Bibasilar atelectasis.    LIVER: Fatty changes in segment 4 of the liver.  BILE DUCTS: Normal caliber.  GALLBLADDER: The gallbladder is mildly distended and measures 9.0 x 4.0 x   5.1 cm. there is edema in the gallbladder wall and mildly increased   enhancement. No pericholecystic stranding is seen.  SPLEEN: Within normal limits.  PANCREAS: The pancreas enhances homogeneously. There is mild edema within   and surrounding the pancreatic tail parenchyma. No discrete fluid   collections. No pancreatic duct dilatation.  ADRENALS: Within normal limits.  KIDNEYS/URETERS: Multiple subcentimeter hypodensities bilaterally which   are not fully characterized. Parapelvic cysts are noted. No   hydronephrosis. No nephroureterolithiasis.    BLADDER: There is a tiny focus of air in the lumen of the urinary bladder.  REPRODUCTIVE ORGANS: Uterus and adnexa within normal limits.    BOWEL: Moderate sized hiatal hernia. No obstruction. No bowel   obstruction. Appendix is normal. Scattered colonic diverticula are not   acutely inflamed.  PERITONEUM: No ascites. No pneumoperitoneum. No loculated fluid   collections.  VESSELS: The abdominal aorta has normal caliber with moderate   atherosclerotic changes. 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 SMA patent, MARK   diminutive and patent. The portal vein, splenic vein, and superior   mesenteric vein are patent. A 1.1 cm, almost circumferentially calcified   splenic artery aneurysm is noted in the splenic hilum.  RETROPERITONEUM/LYMPH NODES: No lymphadenopathy.  ABDOMINAL WALL: Small fat-containing umbilical hernia.  BONES: S-shaped scoliosis of the thoracolumbar spine. Discogenic   degenerative disease. Sclerotic focus in L4, likely benign.    IMPRESSION:  Mild, acute interstitial edematous pancreatitis in the pancreatic tail.   No discrete fluid collections.    Edematous gallbladder wall and increased enhancement. The finding may be   secondary the presence of inflammation inthe pancreas and  versus acute   cholecystitis. Recommend right upper quadrant abdominal ultrasound for   further evaluation and correlation.    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    Focus of air in the bladder. Correlate with instrumentation versus   infection. Correlate with urinalysis.    --- End of Report ---     EDITH DIAZ MD; Resident Radiologist  This document has been electronically signed.  MARCELLO HAWK MD; Attending Radiologist  This document has been electronically signed. Aug 22 2023  3:51PM    < end of copied text >     Surgery Consult Note:    CC: Patient is a 83y old  Female who presents with a chief complaint of cholecystitis, acute interstitial edematous pancreatitis.  Intrathoracic gastric fundal and body herniation (23 Aug 2023 06:52)    Interval HPI: 84 y/o female w/ PMHx of HLD, osteoarthritis, GERD, depression/anxiety, varicose veins presents w/ sudden onset 10/10 upper abdominal pain since  evening, with radiation to bilateral lower back & bilateral shoulders. She states the pain was accompanied w/ nausea and several episodes of emesis that same night. She has had not appetite since then and has not eaten since  night. Admits to chills, weakness, fatigue, nausea, abd bloating and several episodes of diarrhea since arrival to the ED. Denies headaches, chest pain, palpitations, shortness of breath, acid reflux, vomiting or urinary sx.  Last colonoscopy was around 5 years ago, pt was told she does not have to return for another, was not notified of any abnormal findings.  Reports a family hx of Crohn's - sister and granddaughter.  Pt unaware of splenic artery aneurysm. Does report seeing Dr. Sabra acuña for what sounds like vascular clearance prior to her knee surgery earlier this year.    Past Medical & Surgical History:  Anxiety      Depression      Hyperlipidemia      GERD (gastroesophageal reflux disease)      History of vascular disease  venous; has swelling      Osteoarthritis of left knee      HLD (hyperlipidemia)      S/P foot surgery, right  right great toe surgery      S/P wrist surgery  right      S/P tonsillectomy      S/P cataract surgery  bilateral      S/P total knee replacement, left    ROS:  As reviewed in HPI, remaining ROS negative    Medications:  Home Medications:  celecoxib 200 mg oral capsule: 1 cap(s) orally every 12 hours (22 Aug 2023 19:52)  Crestor 40 mg oral tablet: 1 tab(s) orally once a day (at bedtime) (22 Aug 2023 19:52)  Ecotrin Adult Low Strength 81 mg oral delayed release tablet: 1 tab(s) orally every 12 hours (22 Aug 2023 19:52)  Lexapro 20 mg oral tablet: 1 tab(s) orally once a day (22 Aug 2023 19:52)  pantoprazole 40 mg oral delayed release tablet: 1 tab(s) orally once a day (before a meal) (22 Aug 2023 19:52)  Trileptal 300 mg oral tablet: 1 tab(s) orally 2 times a day (22 Aug 2023 19:52)  Vitamin D3 1000 intl units (25 mcg) oral tablet: 1 tab(s) orally once a day (22 Aug 2023 19:52)    MEDICATIONS  (STANDING):  aspirin enteric coated 81 milliGRAM(s) Oral daily  atorvastatin 80 milliGRAM(s) Oral at bedtime  cholecalciferol 1000 Unit(s) Oral daily  enoxaparin Injectable 40 milliGRAM(s) SubCutaneous every 24 hours  escitalopram 20 milliGRAM(s) Oral daily  OXcarbazepine 300 milliGRAM(s) Oral two times a day  pantoprazole    Tablet 40 milliGRAM(s) Oral before breakfast  piperacillin/tazobactam IVPB.. 3.375 Gram(s) IV Intermittent every 8 hours  sodium chloride 0.9%. 1000 milliLiter(s) (100 mL/Hr) IV Continuous <Continuous>    MEDICATIONS  (PRN):  acetaminophen     Tablet .. 650 milliGRAM(s) Oral every 6 hours PRN Temp greater or equal to 38C (100.4F), Mild Pain (1 - 3)  melatonin 3 milliGRAM(s) Oral at bedtime PRN Insomnia  ondansetron Injectable 4 milliGRAM(s) IV Push every 8 hours PRN Nausea and/or Vomiting    Allergies:  ALLERGIES: adhesives (Pruritus; Rash)  No Known Drug Allergies    INTOLERANCES: None, unless indicated below  lactose (Diarrhea)    Social History:  Tobacco:  denies  EtOH: denies   Recreational drug use: denies   Lives with: family   Ambulates: with walker (22 Aug 2023 20:04)    Family History:  Family history of vascular disease  sister   Family history- stomach cancer  father     Vitals:  Vital Signs Last 24 Hrs  T(C): 36.7 (23 Aug 2023 05:28), Max: 36.9 (22 Aug 2023 19:31)  T(F): 98 (23 Aug 2023 05:28), Max: 98.4 (22 Aug 2023 19:31)  HR: 90 (23 Aug 2023 05:28) (88 - 108)  BP: 125/76 (23 Aug 2023 05:28) (111/72 - 130/79)  BP(mean): --  RR: 18 (23 Aug 2023 05:28) (18 - 20)  SpO2: 94% (23 Aug 2023 05:28) (94% - 100%)    Parameters below as of 23 Aug 2023 05:28  Patient On (Oxygen Delivery Method): room air    Physical Exam:  General: no acute distress, appears comfortable, well nourished, well-groomed  HEENT: normocephalic/atraumatic, vision grossly intact, hearing grossly intact, no nasal discharge, ears & nose symmetrical and atraumatic  Neck: supple  Chest: lungs clear to auscultation bilaterally, good inspiratory effort  Heart: heart rate and rhythm regular  Abdomen: soft, nondistended, generalized tenderness to palpation RUQ/RLQ; no guarding, rebound tenderness, no freire's sign  Extremities: no gross deformities, able to move all four extremities, left leg - area erythema on anterior leg; small scab; (+) left calf tenderness  Neuro: alert & oriented x3, motor and sensory grossly intact  Skin: warm, dry, good turgor; no gross lesions, no eruptions, rashes or jaundice    Labs:                        11.3   12.28 )-----------( 267      ( 22 Aug 2023 09:58 )             35.4     08-    139  |  105  |  10  ----------------------------<  106<H>  3.4<L>   |  26  |  0.59    Ca    9.1      22 Aug 2023 09:58    TPro  7.6  /  Alb  3.4  /  TBili  0.4  /  DBili  x   /  AST  44<H>  /  ALT  38  /  AlkPhos  146<H>  08      Urinalysis Basic - ( 23 Aug 2023 06:00 )    Color: Yellow / Appearance: Cloudy / S.023 / pH: x  Gluc: x / Ketone: 15  / Bili: Negative / Urobili: 0.2   Blood: x / Protein: 30 / Nitrite: Negative   Leuk Esterase: Large / RBC: 5 /HPF / WBC Too Numerous to count /HPF   Sq Epi: x / Non Sq Epi: x / Bacteria: x    LIVER FUNCTIONS - ( 22 Aug 2023 09:58 )  Alb: 3.4 g/dL / Pro: 7.6 g/dL / ALK PHOS: 146 U/L / ALT: 38 U/L / AST: 44 U/L / GGT: x           Radiology & Additional Studies:  < from: CT Abdomen and Pelvis w/ IV Cont (23 @ 12:00) >    ACC: [id number] EXAM:  CT ABDOMEN AND PELVIS IC   ORDERED BY: LIUDMILA DALTON     PROCEDURE DATE:  2023    INTERPRETATION:  CLINICAL INFORMATION: Right lower quadrant abdominal pain    COMPARISON: None.    CONTRAST/COMPLICATIONS:  IV Contrast: Omnipaque 350  90 cc administered   10 cc discarded  Oral Contrast: NONE  Complications: None reported at time of study completion    PROCEDURE:  CT of the Abdomen and Pelvis was performed.  Sagittal and coronal reformats were performed.    FINDINGS:  LOWER CHEST: Bibasilar atelectasis.    LIVER: Fatty changes in segment 4 of the liver.  BILE DUCTS: Normal caliber.  GALLBLADDER: The gallbladder is mildly distended and measures 9.0 x 4.0 x   5.1 cm. there is edema in the gallbladder wall and mildly increased   enhancement. No pericholecystic stranding is seen.  SPLEEN: Within normal limits.  PANCREAS: The pancreas enhances homogeneously. There is mild edema within   and surrounding the pancreatic tail parenchyma. No discrete fluid   collections. No pancreatic duct dilatation.  ADRENALS: Within normal limits.  KIDNEYS/URETERS: Multiple subcentimeter hypodensities bilaterally which   are not fully characterized. Parapelvic cysts are noted. No   hydronephrosis. No nephroureterolithiasis.    BLADDER: There is a tiny focus of air in the lumen of the urinary bladder.  REPRODUCTIVE ORGANS: Uterus and adnexa within normal limits.    BOWEL: Moderate sized hiatal hernia. No obstruction. No bowel   obstruction. Appendix is normal. Scattered colonic diverticula are not   acutely inflamed.  PERITONEUM: No ascites. No pneumoperitoneum. No loculated fluid   collections.  VESSELS: The abdominal aorta has normal caliber with moderate   atherosclerotic changes. 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 SMA patent, MARK   diminutive and patent. The portal vein, splenic vein, and superior   mesenteric vein are patent. A 1.1 cm, almost circumferentially calcified   splenic artery aneurysm is noted in the splenic hilum.  RETROPERITONEUM/LYMPH NODES: No lymphadenopathy.  ABDOMINAL WALL: Small fat-containing umbilical hernia.  BONES: S-shaped scoliosis of the thoracolumbar spine. Discogenic   degenerative disease. Sclerotic focus in L4, likely benign.    IMPRESSION:  Mild, acute interstitial edematous pancreatitis in the pancreatic tail.   No discrete fluid collections.    Edematous gallbladder wall and increased enhancement. The finding may be   secondary the presence of inflammation inthe pancreas and  versus acute   cholecystitis. Recommend right upper quadrant abdominal ultrasound for   further evaluation and correlation.    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    Focus of air in the bladder. Correlate with instrumentation versus   infection. Correlate with urinalysis.    --- End of Report ---     EDITH DIAZ MD; Resident Radiologist  This document has been electronically signed.  MARCELLO HAWK MD; Attending Radiologist  This document has been electronically signed. Aug 22 2023  3:51PM    < end of copied text >

## 2023-08-23 NOTE — CONSULT NOTE ADULT - ASSESSMENT
84 y/o female w/ PMHx of HLD, osteoarthritis, GERD, depression/anxiety, varicose veins presents w/ abdominal pain. Vascular surgery consulted for splenic artery aneurysm "near completely peripherally calcified 1.1cm"    PLAN:  - INCOMPLETE, to be updated 82 y/o female w/ PMHx of HLD, osteoarthritis, GERD, depression/anxiety, varicose veins presents w/ abdominal pain. Vascular surgery consulted for splenic artery aneurysm "near completely peripherally calcified 1.1cm"    PLAN:  - INCOMPLETE, to be updated  - LLE edema - f/u duplex 84 y/o female w/ PMHx of HLD, osteoarthritis, GERD, depression/anxiety, varicose veins presents w/ abdominal pain. Vascular surgery consulted for splenic artery aneurysm "near completely peripherally calcified 1.1cm".    PLAN:  - No acute vascular surgery intervention at this time, recommending pt to follow up with Dr. Escalona in office after discharge (please include information on discharge paperwork) - pt and family made aware  - LLE calf tenderness - f/u duplex  - Discussed w/ Dr. Escalona

## 2023-08-23 NOTE — PROGRESS NOTE ADULT - PROBLEM SELECTOR PLAN 5
splenic artery aneurysm "near completely peripherally calcified 1.1cm" seen ct a/p  - Vascular (Dr. Rubio) consulted  - No acute intervention at this time

## 2023-08-23 NOTE — DISCHARGE NOTE PROVIDER - DETAILS OF MALNUTRITION DIAGNOSIS/DIAGNOSES
This patient has been assessed with a concern for Malnutrition and was treated during this hospitalization for the following Nutrition diagnosis/diagnoses:     -  08/25/2023: Mild protein-calorie malnutrition

## 2023-08-23 NOTE — PROGRESS NOTE ADULT - PROBLEM SELECTOR PLAN 1
- On admission patient with abdominal pain, n/v,/ d + lipase 3x the upper limit of normal [address] setting of acute pancreatitis.-Likely Gall stone Pancreatitis   - CT A/P : Mild, acute interstitial edematous pancreatitis in the pancreatic tail. .  - RUQ U/S Echogenicity in the region of the gallbladder neck suggests small stones versus gravel. Gallbladder wall thickening identified measuring 7-8 mm. Trace pericholecystic fluid. Correlate for cholecystitis.  - f/u MRCP  - IVF NS @100 cc/h  - f/up lipid panel   - Lipase improving  - GI (Dr Marques) consulted - S/P  abdominal pain, n/v,/ d + lipase - 2483 l  -  acute pancreatitis.- -Gall stone Pancreatitis   - CT A/P : Mild, acute interstitial edematous pancreatitis in the pancreatic tail. .  - RUQ U/S Echogenicity in the region of the gallbladder neck suggests small stones versus gravel. Gallbladder wall thickening identified measuring 7-8 mm. Trace pericholecystic fluid. Correlate for cholecystitis.  - f/u MRCP as above + Diastal CBD stone   - IVF NS @100 cc/h--> 75 ml/hr   - f/up lipid panel   - Lipase improving  - GI (Dr Mandujano d/w - Pt will need ERCP  -On IV Abx as per ID

## 2023-08-23 NOTE — PROGRESS NOTE ADULT - SUBJECTIVE AND OBJECTIVE BOX
Patient is a 83y old  Female who presents with a chief complaint of cholecystitis, acute interstitial edematous pancreatitis.  Intrathoracic gastric fundal and body herniation (23 Aug 2023 09:21)    HPI:  84 y/o F w/ PMHx of HLD, osteoarthritis, GERD, depression/anxiety, varicose veins, B/L TKR  presents w/ sudden onset 10/10 upper abdominal pain since Sunday evening, pain radiation to bilateral lower back & bilateral shoulders. States  the pain was accompanied w/ nausea and several episodes of emesis that same night. She has had not appetite since then and has not eaten since Sunday night. Admits to chills, weakness, fatigue, nausea, abd bloating and several episodes of diarrhea since arrival to the ED. Denies headaches, chest pain, palpitations, shortness of breath, acid reflux, vomiting or urinary sx. Reports no past hx of pancreatitis. No hx of cancer.   Last colonoscopy was around 5 years ago, family hx of Crohn's - sister and granddaughter. Denies any recent alcohol use, drug abuse.     IN ED,  VITALS /69 HR 94 RR 20 Temp 98F on RA   PERTINENT LABS: Wbc 12k 82% predominant neutrophils, hb 11.3 RDW index 17.5   K 3.4 Glucose 106 AST 44 ALK po4 146  Lipase 2483  CT A/P : Mild, acute interstitial edematous pancreatitis in the pancreatic tail.  No discrete fluid collections. Edematous gallbladder wall and increased enhancement. The finding may be   secondary the presence of inflammation in the pancreas and  versus acute  cholecystitis. Recommend right upper quadrant abdominal ultrasound for  further evaluation and correlation.  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    Focus of air in the bladder. Correlate with instrumentation versus   infection. Correlate with urinalysis.    CXR : Intrathoracic gastric fundal and body herniation. Please see lower chest images of abdominal CT scan 8/22/2023. No airspace consolidation.    RUQ U/S Echogenicity in the region of the gallbladder neck suggests small stones versus gravel. Gallbladder wall thickening identified measuring 7-8 mm. Trace pericholecystic fluid. Correlate for cholecystitis.  s/ p  1X iv Tylenol zosyn, potassium 40mg X1,  2L NS in ER   EKG shows NSR 87 bpm QTc 462  (22 Aug 2023 20:04)    INTERVAL HPI:  8/23: Patient was seen and examined at bedside. They state they have been experiencing many loose BMs (>8 in the last 24 hours), loose BMs started prior to admission. Patient still has constant epigastric pain now at a 6/10. Patient denies HA, F/C, CP,SOB,Abd pain, dysuria, leg swelling, or leg pain. Going for MRCP today.      OVERNIGHT EVENTS: None    Home Medications:  celecoxib 200 mg oral capsule: 1 cap(s) orally every 12 hours (22 Aug 2023 19:52)  Crestor 40 mg oral tablet: 1 tab(s) orally once a day (at bedtime) (22 Aug 2023 19:52)  Ecotrin Adult Low Strength 81 mg oral delayed release tablet: 1 tab(s) orally every 12 hours (22 Aug 2023 19:52)  Lexapro 20 mg oral tablet: 1 tab(s) orally once a day (22 Aug 2023 19:52)  pantoprazole 40 mg oral delayed release tablet: 1 tab(s) orally once a day (before a meal) (22 Aug 2023 19:52)  Trileptal 300 mg oral tablet: 1 tab(s) orally 2 times a day (22 Aug 2023 19:52)  Vitamin D3 1000 intl units (25 mcg) oral tablet: 1 tab(s) orally once a day (22 Aug 2023 19:52)      MEDICATIONS  (STANDING):  aspirin enteric coated 81 milliGRAM(s) Oral daily  atorvastatin 80 milliGRAM(s) Oral at bedtime  cholecalciferol 1000 Unit(s) Oral daily  enoxaparin Injectable 40 milliGRAM(s) SubCutaneous every 24 hours  escitalopram 20 milliGRAM(s) Oral daily  meropenem  IVPB 1000 milliGRAM(s) IV Intermittent every 8 hours  OXcarbazepine 300 milliGRAM(s) Oral two times a day  pantoprazole    Tablet 40 milliGRAM(s) Oral before breakfast  potassium chloride    Tablet ER 40 milliEquivalent(s) Oral every 4 hours  sodium chloride 0.9%. 1000 milliLiter(s) (100 mL/Hr) IV Continuous <Continuous>    MEDICATIONS  (PRN):  acetaminophen     Tablet .. 650 milliGRAM(s) Oral every 6 hours PRN Temp greater or equal to 38C (100.4F), Mild Pain (1 - 3)  melatonin 3 milliGRAM(s) Oral at bedtime PRN Insomnia  morphine  - Injectable 0.5 milliGRAM(s) IV Push every 4 hours PRN Mild Pain (1 - 3)  morphine  - Injectable 2 milliGRAM(s) IV Push every 4 hours PRN Severe Pain (7 - 10)  morphine  - Injectable 1 milliGRAM(s) IV Push every 4 hours PRN Moderate Pain (4 - 6)  ondansetron Injectable 4 milliGRAM(s) IV Push every 8 hours PRN Nausea and/or Vomiting      Allergies    adhesives (Pruritus; Rash)  No Known Drug Allergies    Intolerances    lactose (Diarrhea)      Social History:  Tobacco:  denies  EtOH: denies   Recreational drug use: denies   Lives with: family   Ambulates: with walker (22 Aug 2023 20:04)      REVIEW OF SYSTEMS:  CONSTITUTIONAL: No fever, No chills, No fatigue, No myalgia, No Body ache, No Weakness  EYES: No eye pain,  No visual disturbances, No discharge, NO Redness  ENMT:  No ear pain, No nose bleed, No vertigo; No sinus pain, NO throat pain, No Congestion  NECK: No pain, No stiffness  RESPIRATORY: No cough, NO wheezing, No  hemoptysis, NO  shortness of breath  CARDIOVASCULAR: No chest pain, palpitations  GASTROINTESTINAL: No abdominal pain, + epigastric pain. No nausea, No vomiting; No diarrhea, No constipation. [x  ] 8 loose BM 8/23  GENITOURINARY: No dysuria, No frequency, No urgency, No hematuria, NO incontinence  NEUROLOGICAL: No headaches, No dizziness, No numbness, No tingling, No tremors, No weakness  EXT: No Swelling, No Pain, No Edema  SKIN:  [  ] No itching, burning, rashes, or lesions   MUSCULOSKELETAL: No joint pain ,No Jt swelling; No muscle pain, No back pain, No extremity pain  PSYCHIATRIC: No depression,  No anxiety,  No mood swings ,No difficulty sleeping at night  PAIN SCALE: [  ] None  [x  ] Other- 6/10  REST OF REVIEW Of SYSTEM - [x] Normal     Vital Signs Last 24 Hrs  T(C): 36.7 (23 Aug 2023 05:28), Max: 36.9 (22 Aug 2023 19:31)  T(F): 98 (23 Aug 2023 05:28), Max: 98.4 (22 Aug 2023 19:31)  HR: 90 (23 Aug 2023 05:28) (88 - 95)  BP: 125/76 (23 Aug 2023 05:28) (111/72 - 130/79)  BP(mean): --  RR: 18 (23 Aug 2023 05:28) (18 - 20)  SpO2: 94% (23 Aug 2023 05:28) (94% - 100%)    Parameters below as of 23 Aug 2023 05:28  Patient On (Oxygen Delivery Method): room air      Finger Stick        08-22 @ 07:01  -  08-23 @ 07:00  --------------------------------------------------------  IN: 1400 mL / OUT: 0 mL / NET: 1400 mL        PHYSICAL EXAM:  GENERAL:  [ x ] NAD , [ x ] well appearing, [  ] Agitated, [  ] Drowsy,  [  ] Lethargy, [  ] confused   HEAD:  [x  ] Normal, [  ] Other  EYES:  [  ] EOMI, [  ] PERRLA, [x  ] conjunctiva and sclera clear normal, [  ] Other,  [  ] Pallor,[  ] Discharge  ENMT:  [ x ] Normal, [x  ] Moist mucous membranes, [  ] Good dentition, [  ] No Thrush  NECK:  [  ] Supple, [ x ] No JVD, [ x ] Normal thyroid, [  ] Lymphadenopathy [  ] Other  CHEST/LUNG:  [ x ] Clear to auscultation bilaterally, [ x ] Breath Sounds equal B/L / Decrease, [  ] poor effort  [ x ] No rales, [ x ] No rhonchi  [ x ]  No wheezing,   HEART:  [ x ] Regular rate and rhythm, [  ] tachycardia, [  ] Bradycardia,  [  ] irregular  [ x ] No murmurs, No rubs, No gallops, [  ] PPM in place (Mfr:  )  ABDOMEN:  [ x ] Soft, [ x ] tender in the epigastric region, [x  ] Nondistended, [x  ] No mass, [  ] Bowel sounds present, [  ] obese  NERVOUS SYSTEM:  [ x ] Alert & Oriented X3, [  ] Nonfocal  [  ] Confusion  [  ] Encephalopathic [  ] Sedated [  ] Unable to assess, [  ] Dementia [  ] Other-  EXTREMITIES: [ x ] 2+ Peripheral Pulses, No clubbing, No cyanosis,  [  ] edema B/L lower EXT. [  ] PVD stasis skin changes B/L Lower EXT, [  ] wound  LYMPH: No lymphadenopathy noted  SKIN:  [ x ] No rashes or lesions, [  ] Pressure Ulcers, [  ] ecchymosis, [  ] Skin Tears, [  ] Other    DIET: Diet, NPO:   Except Medications  With Ice Chips/Sips of Water (08-22-23 @ 20:29)      LABS:                        9.3    10.97 )-----------( 203      ( 23 Aug 2023 08:06 )             28.9     23 Aug 2023 08:06    139    |  109    |  9      ----------------------------<  84     3.3     |  23     |  0.45     Ca    8.2        23 Aug 2023 08:06    TPro  6.3    /  Alb  2.7    /  TBili  0.5    /  DBili  x      /  AST  59     /  ALT  54     /  AlkPhos  126    23 Aug 2023 08:06      Urinalysis Basic - ( 23 Aug 2023 08:06 )    Color: x / Appearance: x / SG: x / pH: x  Gluc: 84 mg/dL / Ketone: x  / Bili: x / Urobili: x   Blood: x / Protein: x / Nitrite: x   Leuk Esterase: x / RBC: x / WBC x   Sq Epi: x / Non Sq Epi: x / Bacteria: x                           Anemia Panel:      Thyroid Panel:        Lipase: 271 U/L (08-23-23 @ 08:06)  Amylase: 128 U/L (08-23-23 @ 08:06)  Lipase: 2483 U/L (08-22-23 @ 09:58)          RADIOLOGY & ADDITIONAL TESTS:      HEALTH ISSUES - PROBLEM Dx:  Abdominal pain    Anxiety and depression    Left leg cellulitis    Need for prophylactic measure    Pancreatitis    Cholecystitis            Consultant(s) Notes Reviewed:  [ x ] YES     Care Discussed with [X] Consultants  [ x ] Patient  [ x ] Family [  ] HCP [  ]   [  ] Social Service  [  ] RN, [  ] Physical Therapy,[  ] Palliative care team  DVT PPX: [  ] Lovenox, [  ] S C Heparin, [  ] Coumadin, [  ] Xarelto, [  ] Eliquis, [  ] Pradaxa, [  ] IV Heparin drip, [  ] SCD [  ] Contraindication 2 to GI Bleed,[  ] Ambulation [  ] Contraindicated 2 to  bleed [  ] Contraindicated 2 to Brain Bleed  Advanced directive: [ x ] None, [  ] DNR/DNI Patient is a 83y old  Female who presents with a chief complaint of cholecystitis, acute interstitial edematous pancreatitis.  Intrathoracic gastric fundal and body herniation (23 Aug 2023 09:21)    HPI:  84 y/o F w/ PMHx of HLD, osteoarthritis, GERD, depression/anxiety, varicose veins, B/L TKR  presents w/ sudden onset 10/10 upper abdominal pain since Sunday evening, pain radiation to bilateral lower back & bilateral shoulders. States  the pain was accompanied w/ nausea and several episodes of emesis that same night. She has had not appetite since then and has not eaten since Sunday night. Admits to chills, weakness, fatigue, nausea, abd bloating and several episodes of diarrhea since arrival to the ED. Denies headaches, chest pain, palpitations, shortness of breath, acid reflux, vomiting or urinary sx. Reports no past hx of pancreatitis. No hx of cancer.   Last colonoscopy was around 5 years ago, family hx of Crohn's - sister and granddaughter. Denies any recent alcohol use, drug abuse.     IN ED,  VITALS /69 HR 94 RR 20 Temp 98F on RA   PERTINENT LABS: Wbc 12k 82% predominant neutrophils, hb 11.3 RDW index 17.5   K 3.4 Glucose 106 AST 44 ALK po4 146  Lipase 2483  CT A/P : Mild, acute interstitial edematous pancreatitis in the pancreatic tail.  No discrete fluid collections. Edematous gallbladder wall and increased enhancement. The finding may be   secondary the presence of inflammation in the pancreas and  versus acute  cholecystitis. Recommend right upper quadrant abdominal ultrasound for  further evaluation and correlation.  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    Focus of air in the bladder. Correlate with instrumentation versus   infection. Correlate with urinalysis.    CXR : Intrathoracic gastric fundal and body herniation. Please see lower chest images of abdominal CT scan 8/22/2023. No airspace consolidation.    RUQ U/S Echogenicity in the region of the gallbladder neck suggests small stones versus gravel. Gallbladder wall thickening identified measuring 7-8 mm. Trace pericholecystic fluid. Correlate for cholecystitis.  s/ p  1X iv Tylenol zosyn, potassium 40mg X1,  2L NS in ER   EKG shows NSR 87 bpm QTc 462  (22 Aug 2023 20:04)    INTERVAL HPI:  8/23: Patient was seen and examined at bedside. They state they have been experiencing many loose BMs (>8 in the last 24 hours), loose BMs started prior to admission. Patient still has constant epigastric pain now at a 6/10. Patient denies HA, F/C, CP,SOB,Abd pain, dysuria, leg swelling, or leg pain. Going for MRCP today.      OVERNIGHT EVENTS: None    Home Medications:  celecoxib 200 mg oral capsule: 1 cap(s) orally every 12 hours (22 Aug 2023 19:52)  Crestor 40 mg oral tablet: 1 tab(s) orally once a day (at bedtime) (22 Aug 2023 19:52)  Ecotrin Adult Low Strength 81 mg oral delayed release tablet: 1 tab(s) orally every 12 hours (22 Aug 2023 19:52)  Lexapro 20 mg oral tablet: 1 tab(s) orally once a day (22 Aug 2023 19:52)  pantoprazole 40 mg oral delayed release tablet: 1 tab(s) orally once a day (before a meal) (22 Aug 2023 19:52)  Trileptal 300 mg oral tablet: 1 tab(s) orally 2 times a day (22 Aug 2023 19:52)  Vitamin D3 1000 intl units (25 mcg) oral tablet: 1 tab(s) orally once a day (22 Aug 2023 19:52)      MEDICATIONS  (STANDING):  aspirin enteric coated 81 milliGRAM(s) Oral daily  atorvastatin 80 milliGRAM(s) Oral at bedtime  cholecalciferol 1000 Unit(s) Oral daily  enoxaparin Injectable 40 milliGRAM(s) SubCutaneous every 24 hours  escitalopram 20 milliGRAM(s) Oral daily  meropenem  IVPB 1000 milliGRAM(s) IV Intermittent every 8 hours  OXcarbazepine 300 milliGRAM(s) Oral two times a day  pantoprazole    Tablet 40 milliGRAM(s) Oral before breakfast  potassium chloride    Tablet ER 40 milliEquivalent(s) Oral every 4 hours  sodium chloride 0.9%. 1000 milliLiter(s) (100 mL/Hr) IV Continuous <Continuous>    MEDICATIONS  (PRN):  acetaminophen     Tablet .. 650 milliGRAM(s) Oral every 6 hours PRN Temp greater or equal to 38C (100.4F), Mild Pain (1 - 3)  melatonin 3 milliGRAM(s) Oral at bedtime PRN Insomnia  morphine  - Injectable 0.5 milliGRAM(s) IV Push every 4 hours PRN Mild Pain (1 - 3)  morphine  - Injectable 2 milliGRAM(s) IV Push every 4 hours PRN Severe Pain (7 - 10)  morphine  - Injectable 1 milliGRAM(s) IV Push every 4 hours PRN Moderate Pain (4 - 6)  ondansetron Injectable 4 milliGRAM(s) IV Push every 8 hours PRN Nausea and/or Vomiting      Allergies    adhesives (Pruritus; Rash)  No Known Drug Allergies    Intolerances    lactose (Diarrhea)      Social History:  Tobacco:  denies  EtOH: denies   Recreational drug use: denies   Lives with: family   Ambulates: with walker (22 Aug 2023 20:04)      REVIEW OF SYSTEMS:  CONSTITUTIONAL: No fever, No chills, No fatigue, No myalgia, No Body ache, No Weakness  EYES: No eye pain,  No visual disturbances, No discharge, NO Redness  ENMT:  No ear pain, No nose bleed, No vertigo; No sinus pain, NO throat pain, No Congestion  NECK: No pain, No stiffness  RESPIRATORY: No cough, NO wheezing, No  hemoptysis, NO  shortness of breath  CARDIOVASCULAR: No chest pain, palpitations  GASTROINTESTINAL: No abdominal pain, + epigastric pain. No nausea, No vomiting; No diarrhea, No constipation. [x  ] 8 loose BM 8/23  GENITOURINARY: No dysuria, No frequency, No urgency, No hematuria, NO incontinence  NEUROLOGICAL: No headaches, No dizziness, No numbness, No tingling, No tremors, No weakness  EXT: No Swelling, No Pain, No Edema  SKIN:  [  ] No itching, burning, rashes, or lesions   MUSCULOSKELETAL: No joint pain ,No Jt swelling; No muscle pain, No back pain, No extremity pain  PSYCHIATRIC: No depression,  No anxiety,  No mood swings ,No difficulty sleeping at night  PAIN SCALE: [  ] None  [x  ] Other- 6/10  REST OF REVIEW Of SYSTEM - [x] Normal     Vital Signs Last 24 Hrs  T(C): 36.7 (23 Aug 2023 05:28), Max: 36.9 (22 Aug 2023 19:31)  T(F): 98 (23 Aug 2023 05:28), Max: 98.4 (22 Aug 2023 19:31)  HR: 90 (23 Aug 2023 05:28) (88 - 95)  BP: 125/76 (23 Aug 2023 05:28) (111/72 - 130/79)  BP(mean): --  RR: 18 (23 Aug 2023 05:28) (18 - 20)  SpO2: 94% (23 Aug 2023 05:28) (94% - 100%)    Parameters below as of 23 Aug 2023 05:28  Patient On (Oxygen Delivery Method): room air      Finger Stick        08-22 @ 07:01  -  08-23 @ 07:00  --------------------------------------------------------  IN: 1400 mL / OUT: 0 mL / NET: 1400 mL        PHYSICAL EXAM:  GENERAL:  [ x ] NAD , [ x ] well appearing, [  ] Agitated, [  ] Drowsy,  [  ] Lethargy, [  ] confused   HEAD:  [x  ] Normal, [  ] Other  EYES:  [  ] EOMI, [  ] PERRLA, [x  ] conjunctiva and sclera clear normal, [  ] Other,  [  ] Pallor,[  ] Discharge  ENMT:  [ x ] Normal, [x  ] Moist mucous membranes, [  ] Good dentition, [  ] No Thrush  NECK:  [  ] Supple, [ x ] No JVD, [ x ] Normal thyroid, [  ] Lymphadenopathy [  ] Other  CHEST/LUNG:  [ x ] Clear to auscultation bilaterally, [ x ] Breath Sounds equal B/L / Decrease, [  ] poor effort  [ x ] No rales, [ x ] No rhonchi  [ x ]  No wheezing,   HEART:  [ x ] Regular rate and rhythm, [  ] tachycardia, [  ] Bradycardia,  [  ] irregular  [ x ] No murmurs, No rubs, No gallops, [  ] PPM in place (Mfr:  )  ABDOMEN:  [ x ] Soft, [ x ] tender in the epigastric region, [x  ] Nondistended, [x  ] No mass, [  ] Bowel sounds present, [  ] obese  NERVOUS SYSTEM:  [ x ] Alert & Oriented X3, [  ] Nonfocal  [  ] Confusion  [  ] Encephalopathic [  ] Sedated [  ] Unable to assess, [  ] Dementia [  ] Other-  EXTREMITIES: [ x ] 2+ Peripheral Pulses, No clubbing, No cyanosis,  [  ] edema B/L lower EXT. [  ] PVD stasis skin changes B/L Lower EXT, [  ] wound  LYMPH: No lymphadenopathy noted  SKIN:  [ x ] + Left Anterior skin infection, [  ] Pressure Ulcers, [  ] ecchymosis, [  ] Skin Tears, [  ] Other    DIET: Diet, NPO:   Except Medications  With Ice Chips/Sips of Water (08-22-23 @ 20:29)      LABS:                        9.3    10.97 )-----------( 203      ( 23 Aug 2023 08:06 )             28.9     23 Aug 2023 08:06    139    |  109    |  9      ----------------------------<  84     3.3     |  23     |  0.45     Ca    8.2        23 Aug 2023 08:06    TPro  6.3    /  Alb  2.7    /  TBili  0.5    /  DBili  x      /  AST  59     /  ALT  54     /  AlkPhos  126    23 Aug 2023 08:06      Urinalysis Basic - ( 23 Aug 2023 08:06 )    Color: x / Appearance: x / SG: x / pH: x  Gluc: 84 mg/dL / Ketone: x  / Bili: x / Urobili: x   Blood: x / Protein: x / Nitrite: x   Leuk Esterase: x / RBC: x / WBC x   Sq Epi: x / Non Sq Epi: x / Bacteria: x                           Anemia Panel:      Thyroid Panel:        Lipase: 271 U/L (08-23-23 @ 08:06)  Amylase: 128 U/L (08-23-23 @ 08:06)  Lipase: 2483 U/L (08-22-23 @ 09:58)          RADIOLOGY & ADDITIONAL TESTS:      HEALTH ISSUES - PROBLEM Dx:  Abdominal pain    Anxiety and depression    Left leg cellulitis    Need for prophylactic measure    Pancreatitis    Cholecystitis            Consultant(s) Notes Reviewed:  [ x ] YES     Care Discussed with [X] Consultants  [ x ] Patient  [ x ] Family [  ] HCP [  ]   [  ] Social Service  [  ] RN, [  ] Physical Therapy,[  ] Palliative care team  DVT PPX: [  ] Lovenox, [  ] S C Heparin, [  ] Coumadin, [  ] Xarelto, [  ] Eliquis, [  ] Pradaxa, [  ] IV Heparin drip, [  ] SCD [  ] Contraindication 2 to GI Bleed,[  ] Ambulation [  ] Contraindicated 2 to  bleed [  ] Contraindicated 2 to Brain Bleed  Advanced directive: [ x ] None, [  ] DNR/DNI Patient is a 83y old  Female who presents with a chief complaint of cholecystitis, acute interstitial edematous pancreatitis.  Intrathoracic gastric fundal and body herniation (23 Aug 2023 09:21)    HPI:  82 y/o F w/ PMHx of HLD, osteoarthritis, GERD, depression/anxiety, varicose veins, B/L TKR  presents w/ sudden onset 10/10 upper abdominal pain since Sunday evening, pain radiation to bilateral lower back & bilateral shoulders. States  the pain was accompanied w/ nausea and several episodes of emesis that same night. She has had not appetite since then and has not eaten since Sunday night. Admits to chills, weakness, fatigue, nausea, abd bloating and several episodes of diarrhea since arrival to the ED. Denies headaches, chest pain, palpitations, shortness of breath, acid reflux, vomiting or urinary sx. Reports no past hx of pancreatitis. No hx of cancer.   Last colonoscopy was around 5 years ago, family hx of Crohn's - sister and granddaughter. Denies any recent alcohol use, drug abuse.     IN ED,  VITALS /69 HR 94 RR 20 Temp 98F on RA   PERTINENT LABS: Wbc 12k 82% predominant neutrophils, hb 11.3 RDW index 17.5   K 3.4 Glucose 106 AST 44 ALK po4 146  Lipase 2483  CT A/P : Mild, acute interstitial edematous pancreatitis in the pancreatic tail.  No discrete fluid collections. Edematous gallbladder wall and increased enhancement. The finding may be   secondary the presence of inflammation in the pancreas and  versus acute  cholecystitis. Recommend right upper quadrant abdominal ultrasound for  further evaluation and correlation.  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    Focus of air in the bladder. Correlate with instrumentation versus   infection. Correlate with urinalysis.    CXR : Intrathoracic gastric fundal and body herniation. Please see lower chest images of abdominal CT scan 8/22/2023. No airspace consolidation.    RUQ U/S Echogenicity in the region of the gallbladder neck suggests small stones versus gravel. Gallbladder wall thickening identified measuring 7-8 mm. Trace pericholecystic fluid. Correlate for cholecystitis.  s/ p  1X iv Tylenol zosyn, potassium 40mg X1,  2L NS in ER   EKG shows NSR 87 bpm QTc 462  (22 Aug 2023 20:04)    INTERVAL HPI:  8/23: Patient was seen and examined at bedside. They state they have been experiencing many loose BMs (>8 in the last 24 hours), loose BMs started prior to admission. Patient still has constant epigastric pain now at a 6/10. Patient denies HA, F/C, CP,SOB,Abd pain, dysuria, leg swelling, or leg pain. Going for MRCP today.      OVERNIGHT EVENTS: None    Home Medications:  celecoxib 200 mg oral capsule: 1 cap(s) orally every 12 hours (22 Aug 2023 19:52)  Crestor 40 mg oral tablet: 1 tab(s) orally once a day (at bedtime) (22 Aug 2023 19:52)  Ecotrin Adult Low Strength 81 mg oral delayed release tablet: 1 tab(s) orally every 12 hours (22 Aug 2023 19:52)  Lexapro 20 mg oral tablet: 1 tab(s) orally once a day (22 Aug 2023 19:52)  pantoprazole 40 mg oral delayed release tablet: 1 tab(s) orally once a day (before a meal) (22 Aug 2023 19:52)  Trileptal 300 mg oral tablet: 1 tab(s) orally 2 times a day (22 Aug 2023 19:52)  Vitamin D3 1000 intl units (25 mcg) oral tablet: 1 tab(s) orally once a day (22 Aug 2023 19:52)      MEDICATIONS  (STANDING):  aspirin enteric coated 81 milliGRAM(s) Oral daily  atorvastatin 80 milliGRAM(s) Oral at bedtime  cholecalciferol 1000 Unit(s) Oral daily  enoxaparin Injectable 40 milliGRAM(s) SubCutaneous every 24 hours  escitalopram 20 milliGRAM(s) Oral daily  meropenem  IVPB 1000 milliGRAM(s) IV Intermittent every 8 hours  OXcarbazepine 300 milliGRAM(s) Oral two times a day  pantoprazole    Tablet 40 milliGRAM(s) Oral before breakfast  potassium chloride    Tablet ER 40 milliEquivalent(s) Oral every 4 hours  sodium chloride 0.9%. 1000 milliLiter(s) (100 mL/Hr) IV Continuous <Continuous>    MEDICATIONS  (PRN):  acetaminophen     Tablet .. 650 milliGRAM(s) Oral every 6 hours PRN Temp greater or equal to 38C (100.4F), Mild Pain (1 - 3)  melatonin 3 milliGRAM(s) Oral at bedtime PRN Insomnia  morphine  - Injectable 0.5 milliGRAM(s) IV Push every 4 hours PRN Mild Pain (1 - 3)  morphine  - Injectable 2 milliGRAM(s) IV Push every 4 hours PRN Severe Pain (7 - 10)  morphine  - Injectable 1 milliGRAM(s) IV Push every 4 hours PRN Moderate Pain (4 - 6)  ondansetron Injectable 4 milliGRAM(s) IV Push every 8 hours PRN Nausea and/or Vomiting      Allergies    adhesives (Pruritus; Rash)  No Known Drug Allergies    Intolerances    lactose (Diarrhea)      Social History:  Tobacco:  denies  EtOH: denies   Recreational drug use: denies   Lives with: family   Ambulates: with walker (22 Aug 2023 20:04)      REVIEW OF SYSTEMS:  CONSTITUTIONAL: No fever, No chills, No fatigue, No myalgia, No Body ache, No Weakness  EYES: No eye pain,  No visual disturbances, No discharge, NO Redness  ENMT:  No ear pain, No nose bleed, No vertigo; No sinus pain, NO throat pain, No Congestion  NECK: No pain, No stiffness  RESPIRATORY: No cough, NO wheezing, No  hemoptysis, NO  shortness of breath  CARDIOVASCULAR: No chest pain, palpitations  GASTROINTESTINAL: No abdominal pain, + epigastric pain. No nausea, No vomiting; No diarrhea, No constipation. [x  ] 8 loose BM 8/23  GENITOURINARY: No dysuria, No frequency, No urgency, No hematuria, NO incontinence  NEUROLOGICAL: No headaches, No dizziness, No numbness, No tingling, No tremors, No weakness  EXT: No Swelling, No Pain, No Edema  SKIN:  [x  ] No itching, burning, rashes, or lesions   MUSCULOSKELETAL: No joint pain ,No Jt swelling; No muscle pain, No back pain, No extremity pain  PSYCHIATRIC: No depression,  No anxiety,  No mood swings ,No difficulty sleeping at night  PAIN SCALE: [  ] None  [x  ] Other- 6/10- abdominal  REST OF REVIEW Of SYSTEM - [x] Normal     Vital Signs Last 24 Hrs  T(C): 36.7 (23 Aug 2023 05:28), Max: 36.9 (22 Aug 2023 19:31)  T(F): 98 (23 Aug 2023 05:28), Max: 98.4 (22 Aug 2023 19:31)  HR: 90 (23 Aug 2023 05:28) (88 - 95)  BP: 125/76 (23 Aug 2023 05:28) (111/72 - 130/79)  BP(mean): --  RR: 18 (23 Aug 2023 05:28) (18 - 20)  SpO2: 94% (23 Aug 2023 05:28) (94% - 100%)    Parameters below as of 23 Aug 2023 05:28  Patient On (Oxygen Delivery Method): room air      Finger Stick        08-22 @ 07:01  -  08-23 @ 07:00  --------------------------------------------------------  IN: 1400 mL / OUT: 0 mL / NET: 1400 mL        PHYSICAL EXAM:  GENERAL:  [ x ] NAD , [ x ] well appearing, [  ] Agitated, [  ] Drowsy,  [  ] Lethargy, [  ] confused   HEAD:  [x  ] Normal, [  ] Other  EYES:  [  ] EOMI, [  ] PERRLA, [x  ] conjunctiva and sclera clear normal, [  ] Other,  [  ] Pallor,[  ] Discharge  ENMT:  [ x ] Normal, [x  ] Moist mucous membranes, [ x ] Good dentition, [x  ] No Thrush  NECK:  [  ] Supple, [ x ] No JVD, [ x ] Normal thyroid, [  ] Lymphadenopathy [  ] Other  CHEST/LUNG:  [ x ] Clear to auscultation bilaterally, [ x ] Breath Sounds equal B/L / Decrease, [ x ] poor effort  [ x ] No rales, [ x ] No rhonchi  [ x ]  No wheezing,   HEART:  [ x ] Regular rate and rhythm, [  ] tachycardia, [  ] Bradycardia,  [  ] irregular  [ x ] No murmurs, No rubs, No gallops, [  ] PPM in place (Mfr:  )  ABDOMEN:  [ x ] Soft, [ x ] tender in the epigastric region, [x  ] Nondistended, [x  ] No mass, [  ] Bowel sounds present, [ x ] obese  NERVOUS SYSTEM:  [ x ] Alert & Oriented X3, [ x ] Nonfocal  [  ] Confusion  [  ] Encephalopathic [  ] Sedated [  ] Unable to assess, [  ] Dementia [  ] Other-  EXTREMITIES: [ x ] 2+ Peripheral Pulses, No clubbing, No cyanosis,  [  ] edema B/L lower EXT. [  ] PVD stasis skin changes B/L Lower EXT, [  ] wound  LYMPH: No lymphadenopathy noted  SKIN:  [ x ] + Left  Lower Ext shin Anterior skin erythema + warm , No Open wound [  ] Pressure Ulcers, [  ] ecchymosis, [  ] Skin Tears, [  ] Other    DIET: Diet, NPO:   Except Medications  With Ice Chips/Sips of Water (08-22-23 @ 20:29)      LABS:                        9.3    10.97 )-----------( 203      ( 23 Aug 2023 08:06 )             28.9     23 Aug 2023 08:06    139    |  109    |  9      ----------------------------<  84     3.3     |  23     |  0.45     Ca    8.2        23 Aug 2023 08:06    TPro  6.3    /  Alb  2.7    /  TBili  0.5    /  DBili  x      /  AST  59     /  ALT  54     /  AlkPhos  126    23 Aug 2023 08:06      Urinalysis Basic - ( 23 Aug 2023 08:06 )    Color: x / Appearance: x / SG: x / pH: x  Gluc: 84 mg/dL / Ketone: x  / Bili: x / Urobili: x   Blood: x / Protein: x / Nitrite: x   Leuk Esterase: x / RBC: x / WBC x   Sq Epi: x / Non Sq Epi: x / Bacteria: x      Lipase: 271 U/L (08-23-23 @ 08:06)  Amylase: 128 U/L (08-23-23 @ 08:06)  Lipase: 2483 U/L (08-22-23 @ 09:58)          RADIOLOGY & ADDITIONAL TESTS:  < from: MR MRCP w/wo IV Cont (08.23.23 @ 13:17) >  IMPRESSION:    Motion limited evaluation.    Acute cholecystitis. Mild pancreatitis. Cystic focus of the pancreatic   body measuring up to 8 mm, image 23 series 3 may represent IPMN or   sequelae of pancreatitis.    No biliary distention. Mid/distal choledocholithiasis of the CBD, images   25-29 series 4 and image 17 series 2. Stones are not seen on all   sequences, likely due to small size and artifact.    Trace pleural effusions. Probable dependent atelectasis of the lung   parenchyma, suboptimally assessed on MRI. Moderate hiatal hernia.    --- End of Report ---    < end of copied text >  < from: US Duplex Venous Lower Ext Complete, Bilateral (08.23.23 @ 10:35) >    LEFT:  Normal compressibility of the LEFT common femoral, femoral and popliteal   veins.  Doppler examination shows normal spontaneous and phasic flow.  No LEFT calf vein thrombosis is detected.    IMPRESSION:  No evidence of deep venous thrombosis in either lower extremity.      < end of copied text >      HEALTH ISSUES - PROBLEM Dx:  Abdominal pain    Anxiety and depression    Left leg cellulitis    Need for prophylactic measure    Pancreatitis    Cholecystitis            Consultant(s) Notes Reviewed:  [ x ] YES     Care Discussed with [X] Consultants  [ x ] Patient  [ x ] Family [  ] HCP [  ]   [  ] Social Service  [x  ] RN, [  ] Physical Therapy,[  ] Palliative care team  DVT PPX: [ x ] Lovenox, [  ] S C Heparin, [  ] Coumadin, [  ] Xarelto, [  ] Eliquis, [  ] Pradaxa, [  ] IV Heparin drip, [  ] SCD [  ] Contraindication 2 to GI Bleed,[  ] Ambulation [  ] Contraindicated 2 to  bleed [  ] Contraindicated 2 to Brain Bleed  Advanced directive: [ x ] None, [  ] DNR/DNI

## 2023-08-23 NOTE — CONSULT NOTE ADULT - ASSESSMENT
Pancreatitis  Cholecystitis  ?choledocholithiasis    CT and US noted  LFTs noted  Follow MRCP  IVF  Supportive care  Surgery recs appreciated    I reviewed the overnight course of events on the unit, re-confirming the patient history. I discussed the care with the patient and their family  Differential diagnosis and plan of care discussed with patient after the evaluation  40 minutes spent on total encounter of which more than fifty percent of the encounter was spent counseling and/or coordinating care by the attending physician.  Advanced care planning was discussed with patient and family.  Advanced care planning forms were reviewed and discussed.  Risks, benefits and alternatives of gastroenterologic procedures were discussed in detail and all questions were answered.

## 2023-08-23 NOTE — PROGRESS NOTE ADULT - SUBJECTIVE AND OBJECTIVE BOX
SUBJECTIVE:  Patient seen and examined at bedside. No overnight events. Patient reports persistent epigastric pain, somewhat improved from admission. Admits to flatus and loose BM x3. Voiding.  Patient denies any fever, chills, chest pain, shortness of breath, nausea, vomiting, or urinary complaints.    CHARTING IN PROGRESS    VITALS  Vital Signs Last 24 Hrs  T(C): 36.7 (23 Aug 2023 05:28), Max: 36.9 (22 Aug 2023 19:31)  T(F): 98 (23 Aug 2023 05:28), Max: 98.4 (22 Aug 2023 19:31)  HR: 90 (23 Aug 2023 05:28) (88 - 108)  BP: 125/76 (23 Aug 2023 05:28) (111/72 - 130/79)  BP(mean): --  RR: 18 (23 Aug 2023 05:28) (18 - 20)  SpO2: 94% (23 Aug 2023 05:28) (94% - 100%)    Parameters below as of 23 Aug 2023 05:28  Patient On (Oxygen Delivery Method): room air    PHYSICAL EXAM  GENERAL:  Well-nourished, well-developed Female lying comfortably in bed in [facility].  HEENT:  NC/AT. Sclera white. Mucous membranes moist.  ABDOMEN: Soft, nondistended, moderate epigastric ttp; No rebound tenderness or guarding.  EXTREMITIES: No calf tenderness bilaterally.  SKIN:  No jaundice, pallor, or cyanosis  NEURO:  A&O x 3    INTAKE & OUTPUT  I&O's Summary    22 Aug 2023 07:01  -  23 Aug 2023 07:00  --------------------------------------------------------  IN: 1400 mL / OUT: 0 mL / NET: 1400 mL      I&O's Detail    22 Aug 2023 07:01  -  23 Aug 2023 07:00  --------------------------------------------------------  IN:    IV PiggyBack: 200 mL    sodium chloride 0.9%: 1200 mL  Total IN: 1400 mL    OUT:  Total OUT: 0 mL    Total NET: 1400 mL          MEDICATIONS  MEDICATIONS  (STANDING):  aspirin enteric coated 81 milliGRAM(s) Oral daily  atorvastatin 80 milliGRAM(s) Oral at bedtime  cholecalciferol 1000 Unit(s) Oral daily  enoxaparin Injectable 40 milliGRAM(s) SubCutaneous every 24 hours  escitalopram 20 milliGRAM(s) Oral daily  OXcarbazepine 300 milliGRAM(s) Oral two times a day  pantoprazole    Tablet 40 milliGRAM(s) Oral before breakfast  piperacillin/tazobactam IVPB.. 3.375 Gram(s) IV Intermittent every 8 hours  sodium chloride 0.9%. 1000 milliLiter(s) (100 mL/Hr) IV Continuous <Continuous>    MEDICATIONS  (PRN):  acetaminophen     Tablet .. 650 milliGRAM(s) Oral every 6 hours PRN Temp greater or equal to 38C (100.4F), Mild Pain (1 - 3)  melatonin 3 milliGRAM(s) Oral at bedtime PRN Insomnia  ondansetron Injectable 4 milliGRAM(s) IV Push every 8 hours PRN Nausea and/or Vomiting      LABS:                        11.3   12.28 )-----------( 267      ( 22 Aug 2023 09:58 )             35.4     08-22    139  |  105  |  10  ----------------------------<  106<H>  3.4<L>   |  26  |  0.59    Ca    9.1      22 Aug 2023 09:58    TPro  7.6  /  Alb  3.4  /  TBili  0.4  /  DBili  x   /  AST  44<H>  /  ALT  38  /  AlkPhos  146<H>  08-22          RADIOLOGY & ADDITIONAL STUDIES:    ASSESSMENT & PLAN   83y Female POD# s/p    - Continue diet  - Continue antibiotics  - Serial abdominal exams  - Is & Os  - DVT prophylaxis with  - OOB, pain control  - Incentive spirometry  - Follow up AM labs  - Case to be discussed with   SUBJECTIVE:  Patient seen and examined at bedside. No overnight events. Patient reports persistent epigastric pain, somewhat improved from admission. Admits to flatus and loose BM x3. Voiding.  Patient denies any fever, chills, chest pain, shortness of breath, nausea, vomiting, or urinary complaints.    VITALS  Vital Signs Last 24 Hrs  T(C): 36.7 (23 Aug 2023 05:28), Max: 36.9 (22 Aug 2023 19:31)  T(F): 98 (23 Aug 2023 05:28), Max: 98.4 (22 Aug 2023 19:31)  HR: 90 (23 Aug 2023 05:28) (88 - 108)  BP: 125/76 (23 Aug 2023 05:28) (111/72 - 130/79)  BP(mean): --  RR: 18 (23 Aug 2023 05:28) (18 - 20)  SpO2: 94% (23 Aug 2023 05:28) (94% - 100%)    Parameters below as of 23 Aug 2023 05:28  Patient On (Oxygen Delivery Method): room air    PHYSICAL EXAM  GENERAL:  Well-nourished, well-developed Female lying comfortably in bed in [facility].  HEENT:  NC/AT. Sclera white. Mucous membranes moist.  ABDOMEN: Soft, nondistended, moderate epigastric ttp; No rebound tenderness or guarding.  SKIN:  No jaundice, pallor, or cyanosis  NEURO:  A&O x 3    INTAKE & OUTPUT  I&O's Summary    22 Aug 2023 07:01  -  23 Aug 2023 07:00  --------------------------------------------------------  IN: 1400 mL / OUT: 0 mL / NET: 1400 mL    I&O's Detail    22 Aug 2023 07:01  -  23 Aug 2023 07:00  --------------------------------------------------------  IN:    IV PiggyBack: 200 mL    sodium chloride 0.9%: 1200 mL  Total IN: 1400 mL    OUT:  Total OUT: 0 mL    Total NET: 1400 mL    MEDICATIONS  MEDICATIONS  (STANDING):  aspirin enteric coated 81 milliGRAM(s) Oral daily  atorvastatin 80 milliGRAM(s) Oral at bedtime  cholecalciferol 1000 Unit(s) Oral daily  enoxaparin Injectable 40 milliGRAM(s) SubCutaneous every 24 hours  escitalopram 20 milliGRAM(s) Oral daily  OXcarbazepine 300 milliGRAM(s) Oral two times a day  pantoprazole    Tablet 40 milliGRAM(s) Oral before breakfast  piperacillin/tazobactam IVPB.. 3.375 Gram(s) IV Intermittent every 8 hours  sodium chloride 0.9%. 1000 milliLiter(s) (100 mL/Hr) IV Continuous <Continuous>    MEDICATIONS  (PRN):  acetaminophen     Tablet .. 650 milliGRAM(s) Oral every 6 hours PRN Temp greater or equal to 38C (100.4F), Mild Pain (1 - 3)  melatonin 3 milliGRAM(s) Oral at bedtime PRN Insomnia  ondansetron Injectable 4 milliGRAM(s) IV Push every 8 hours PRN Nausea and/or Vomiting    LABS:                      11.3   12.28 )-----------( 267      ( 22 Aug 2023 09:58 )             35.4     08-22    139  |  105  |  10  ----------------------------<  106<H>  3.4<L>   |  26  |  0.59    Ca    9.1      22 Aug 2023 09:58    TPro  7.6  /  Alb  3.4  /  TBili  0.4  /  DBili  x   /  AST  44<H>  /  ALT  38  /  AlkPhos  146<H>  08-22    ASSESSMENT & PLAN  84 y/o F w/ PMHx of HLD, osteoarthritis, GERD, depression/anxiety, varicose veins, Left TKR a/w pancreatitis.  VSS. Pain somewhat improved today but still present.  Findings of moderate hiatal hernia d/w pt. Pt endorses being asymptomatic (i.e. Denies chest pain, reflux, sensation of trapped food/liquids in chest).  Downtrending leukocytosis noted. Metabolic panel pending.    - NPO  - ZENAIDA  - Resumption of diet once pt's pain improves  - Pain control, supportive care  - Rest of care per primary team

## 2023-08-23 NOTE — DISCHARGE NOTE PROVIDER - PROVIDER TOKENS
PROVIDER:[[id number]],FOLLOWUP:[1 week]],PROVIDER:[TOKEN:[[id number]],FOLLOWUP:[1 week]] PROVIDER:[TOKEN:[[id number]],FOLLOWUP:[1 week]],PROVIDER:[TOKEN:[[id number]],FOLLOWUP:[1 week]],PROVIDER:[TOKEN:[[id number]],FOLLOWUP:[1 week]],PROVIDER:[TOKEN:[[id number]],FOLLOWUP:[1 week]] PROVIDER:[TOKEN:[[id number]],FOLLOWUP:[1 week]],PROVIDER:[TOKEN:[[id number]],FOLLOWUP:[1 week]],PROVIDER:[TOKEN:[[id number]],FOLLOWUP:[1 week]],PROVIDER:[TOKEN:[[id number]],FOLLOWUP:[1 week]],PROVIDER:[TOKEN:[[id number]],FOLLOWUP:[1 week]]

## 2023-08-23 NOTE — PROGRESS NOTE ADULT - PROBLEM SELECTOR PLAN 3
- Patient presents with mild erythema LL Ext ,+ itching and tender to touch  likely 2/2 LLE cellulitis  - Given 1 x dose IV zosyn , 2L NS Bolus in the ED  - Changed IV zosyn to Meropenem in the setting of Diarrhea   - f/u Bcx and Ucx, MRSA PCR   - Lower extremity doppler us negative  - WBC trending down  - ID Dr. Lemus consulted, - Patient presents with mild erythema LL Ext ,+ itching and tender to touch  likely 2/2 LLE cellulitis  - Given 1 x dose IV zosyn , 2L NS Bolus in the ED  - Changed IV zosyn to Meropenem 1 gm q 8 hrs  in the setting of Diarrhea   - f/u Bcx and Ucx, MRSA PCR   - Lower extremity doppler us negative  - WBC trending down  - ID Dr. AMBER Kwok d/w

## 2023-08-23 NOTE — DISCHARGE NOTE PROVIDER - NSDCMRMEDTOKEN_GEN_ALL_CORE_FT
celecoxib 200 mg oral capsule: 1 cap(s) orally every 12 hours  Crestor 40 mg oral tablet: 1 tab(s) orally once a day (at bedtime)  Ecotrin Adult Low Strength 81 mg oral delayed release tablet: 1 tab(s) orally every 12 hours  Lexapro 20 mg oral tablet: 1 tab(s) orally once a day  pantoprazole 40 mg oral delayed release tablet: 1 tab(s) orally once a day (before a meal)  Trileptal 300 mg oral tablet: 1 tab(s) orally 2 times a day  Vitamin D3 1000 intl units (25 mcg) oral tablet: 1 tab(s) orally once a day   celecoxib 200 mg oral capsule: 1 cap(s) orally every 12 hours  Crestor 40 mg oral tablet: 1 tab(s) orally once a day (at bedtime)  Ecotrin Adult Low Strength 81 mg oral delayed release tablet: 1 tab(s) orally every 12 hours  lactobacillus acidophilus oral capsule: 1 cap(s) orally once a day  Lexapro 20 mg oral tablet: 1 tab(s) orally once a day  pantoprazole 40 mg oral delayed release tablet: 1 tab(s) orally once a day (before a meal)  Trileptal 300 mg oral tablet: 1 tab(s) orally 2 times a day  Vitamin D3 1000 intl units (25 mcg) oral tablet: 1 tab(s) orally once a day   celecoxib 200 mg oral capsule: 1 cap(s) orally every 12 hours  cephalexin 500 mg oral tablet: 1 tab(s) orally every 6 hours Please take for Seven Days every 6 hours  Crestor 40 mg oral tablet: 1 tab(s) orally once a day (at bedtime)  Ecotrin Adult Low Strength 81 mg oral delayed release tablet: 1 tab(s) orally every 12 hours  lactobacillus acidophilus oral capsule: 1 cap(s) orally once a day  Lexapro 20 mg oral tablet: 1 tab(s) orally once a day  pantoprazole 40 mg oral delayed release tablet: 1 tab(s) orally once a day (before a meal)  Trileptal 300 mg oral tablet: 1 tab(s) orally 2 times a day  Vitamin D3 1000 intl units (25 mcg) oral tablet: 1 tab(s) orally once a day   celecoxib 200 mg oral capsule: 1 cap(s) orally every 12 hours  cephalexin 500 mg oral tablet: 1 tab(s) orally every 6 hours Please take for Seven Days every 6 hours  Colace 100 mg oral capsule: 1 cap(s) orally 3 times a day as needed for  constipation  Crestor 40 mg oral tablet: 1 tab(s) orally once a day (at bedtime)  Ecotrin Adult Low Strength 81 mg oral delayed release tablet: 1 tab(s) orally every 12 hours  lactobacillus acidophilus oral capsule: 1 cap(s) orally once a day  Lexapro 20 mg oral tablet: 1 tab(s) orally once a day  oxyCODONE 5 mg oral tablet: 1 tab(s) orally every 6 hours as needed for  severe pain MDD: 4  pantoprazole 40 mg oral delayed release tablet: 1 tab(s) orally once a day (before a meal)  Trileptal 300 mg oral tablet: 1 tab(s) orally 2 times a day  Tylenol Extra Strength 500 mg oral tablet: 2 tab(s) orally every 6 hours may take for pain every 6 hours as needed  Vitamin D3 1000 intl units (25 mcg) oral tablet: 1 tab(s) orally once a day

## 2023-08-24 ENCOUNTER — TRANSCRIPTION ENCOUNTER (OUTPATIENT)
Age: 83
End: 2023-08-24

## 2023-08-24 LAB
ALBUMIN SERPL ELPH-MCNC: 2.6 G/DL — LOW (ref 3.3–5)
ALP SERPL-CCNC: 120 U/L — SIGNIFICANT CHANGE UP (ref 40–120)
ALT FLD-CCNC: 40 U/L — SIGNIFICANT CHANGE UP (ref 12–78)
ANION GAP SERPL CALC-SCNC: 8 MMOL/L — SIGNIFICANT CHANGE UP (ref 5–17)
AST SERPL-CCNC: 39 U/L — HIGH (ref 15–37)
BILIRUB SERPL-MCNC: 0.5 MG/DL — SIGNIFICANT CHANGE UP (ref 0.2–1.2)
BUN SERPL-MCNC: 7 MG/DL — SIGNIFICANT CHANGE UP (ref 7–23)
CALCIUM SERPL-MCNC: 8.5 MG/DL — SIGNIFICANT CHANGE UP (ref 8.5–10.1)
CHLORIDE SERPL-SCNC: 107 MMOL/L — SIGNIFICANT CHANGE UP (ref 96–108)
CO2 SERPL-SCNC: 21 MMOL/L — LOW (ref 22–31)
CREAT SERPL-MCNC: 0.49 MG/DL — LOW (ref 0.5–1.3)
EGFR: 93 ML/MIN/1.73M2 — SIGNIFICANT CHANGE UP
EPEC DNA STL QL NAA+PROBE: DETECTED
GI PCR PANEL: DETECTED
GLUCOSE SERPL-MCNC: 72 MG/DL — SIGNIFICANT CHANGE UP (ref 70–99)
HCT VFR BLD CALC: 31 % — LOW (ref 34.5–45)
HGB BLD-MCNC: 9.9 G/DL — LOW (ref 11.5–15.5)
MCHC RBC-ENTMCNC: 26 PG — LOW (ref 27–34)
MCHC RBC-ENTMCNC: 31.9 GM/DL — LOW (ref 32–36)
MCV RBC AUTO: 81.4 FL — SIGNIFICANT CHANGE UP (ref 80–100)
NRBC # BLD: 0 /100 WBCS — SIGNIFICANT CHANGE UP (ref 0–0)
PLATELET # BLD AUTO: 223 K/UL — SIGNIFICANT CHANGE UP (ref 150–400)
POTASSIUM SERPL-MCNC: 3.9 MMOL/L — SIGNIFICANT CHANGE UP (ref 3.5–5.3)
POTASSIUM SERPL-SCNC: 3.9 MMOL/L — SIGNIFICANT CHANGE UP (ref 3.5–5.3)
PROT SERPL-MCNC: 6.5 G/DL — SIGNIFICANT CHANGE UP (ref 6–8.3)
RBC # BLD: 3.81 M/UL — SIGNIFICANT CHANGE UP (ref 3.8–5.2)
RBC # FLD: 18.1 % — HIGH (ref 10.3–14.5)
SODIUM SERPL-SCNC: 136 MMOL/L — SIGNIFICANT CHANGE UP (ref 135–145)
WBC # BLD: 11.55 K/UL — HIGH (ref 3.8–10.5)
WBC # FLD AUTO: 11.55 K/UL — HIGH (ref 3.8–10.5)

## 2023-08-24 PROCEDURE — 99232 SBSQ HOSP IP/OBS MODERATE 35: CPT

## 2023-08-24 RX ORDER — VANCOMYCIN HCL 1 G
VIAL (EA) INTRAVENOUS
Refills: 0 | Status: DISCONTINUED | OUTPATIENT
Start: 2023-08-24 | End: 2023-08-25

## 2023-08-24 RX ORDER — VANCOMYCIN HCL 1 G
500 VIAL (EA) INTRAVENOUS EVERY 12 HOURS
Refills: 0 | Status: DISCONTINUED | OUTPATIENT
Start: 2023-08-24 | End: 2023-08-25

## 2023-08-24 RX ORDER — VANCOMYCIN HCL 1 G
500 VIAL (EA) INTRAVENOUS ONCE
Refills: 0 | Status: COMPLETED | OUTPATIENT
Start: 2023-08-24 | End: 2023-08-24

## 2023-08-24 RX ORDER — SOD,AMMONIUM,POTASSIUM LACTATE
1 CREAM (GRAM) TOPICAL
Refills: 0 | Status: DISCONTINUED | OUTPATIENT
Start: 2023-08-24 | End: 2023-08-25

## 2023-08-24 RX ADMIN — ESCITALOPRAM OXALATE 20 MILLIGRAM(S): 10 TABLET, FILM COATED ORAL at 12:32

## 2023-08-24 RX ADMIN — MORPHINE SULFATE 1 MILLIGRAM(S): 50 CAPSULE, EXTENDED RELEASE ORAL at 06:07

## 2023-08-24 RX ADMIN — Medication 81 MILLIGRAM(S): at 12:32

## 2023-08-24 RX ADMIN — OXCARBAZEPINE 300 MILLIGRAM(S): 300 TABLET, FILM COATED ORAL at 05:28

## 2023-08-24 RX ADMIN — MEROPENEM 100 MILLIGRAM(S): 1 INJECTION INTRAVENOUS at 21:41

## 2023-08-24 RX ADMIN — ATORVASTATIN CALCIUM 80 MILLIGRAM(S): 80 TABLET, FILM COATED ORAL at 21:41

## 2023-08-24 RX ADMIN — Medication 1 TABLET(S): at 12:32

## 2023-08-24 RX ADMIN — OXCARBAZEPINE 300 MILLIGRAM(S): 300 TABLET, FILM COATED ORAL at 18:16

## 2023-08-24 RX ADMIN — MEROPENEM 100 MILLIGRAM(S): 1 INJECTION INTRAVENOUS at 05:33

## 2023-08-24 RX ADMIN — ENOXAPARIN SODIUM 40 MILLIGRAM(S): 100 INJECTION SUBCUTANEOUS at 21:41

## 2023-08-24 RX ADMIN — Medication 1 APPLICATION(S): at 17:47

## 2023-08-24 RX ADMIN — Medication 1000 UNIT(S): at 12:32

## 2023-08-24 RX ADMIN — MEROPENEM 100 MILLIGRAM(S): 1 INJECTION INTRAVENOUS at 14:16

## 2023-08-24 RX ADMIN — Medication 100 MILLIGRAM(S): at 12:32

## 2023-08-24 RX ADMIN — MORPHINE SULFATE 1 MILLIGRAM(S): 50 CAPSULE, EXTENDED RELEASE ORAL at 05:37

## 2023-08-24 RX ADMIN — PANTOPRAZOLE SODIUM 40 MILLIGRAM(S): 20 TABLET, DELAYED RELEASE ORAL at 05:28

## 2023-08-24 NOTE — PROGRESS NOTE ADULT - SUBJECTIVE AND OBJECTIVE BOX
[facility]  Daren Quach PA-C  [address] [place] [address]  [phone]      INTERVAL HPI/OVERNIGHT EVENTS:  Pt s/e with daughter at bedside  Discussed MRI results and plan  Pt still having epigastric pain  Last loose BM was yesterday; no BM today    MEDICATIONS  (STANDING):  ammonium lactate 12% Lotion 1 Application(s) Topical two times a day  aspirin enteric coated 81 milliGRAM(s) Oral daily  atorvastatin 80 milliGRAM(s) Oral at bedtime  cholecalciferol 1000 Unit(s) Oral daily  enoxaparin Injectable 40 milliGRAM(s) SubCutaneous every 24 hours  escitalopram 20 milliGRAM(s) Oral daily  lactobacillus acidophilus 1 Tablet(s) Oral daily  meropenem  IVPB 1000 milliGRAM(s) IV Intermittent every 8 hours  OXcarbazepine 300 milliGRAM(s) Oral two times a day  pantoprazole    Tablet 40 milliGRAM(s) Oral before breakfast  sodium chloride 0.9%. 1000 milliLiter(s) (75 mL/Hr) IV Continuous <Continuous>  vancomycin  IVPB 500 milliGRAM(s) IV Intermittent every 12 hours  vancomycin  IVPB        MEDICATIONS  (PRN):  acetaminophen     Tablet .. 650 milliGRAM(s) Oral every 6 hours PRN Temp greater or equal to 38C (100.4F), Mild Pain (1 - 3)  melatonin 3 milliGRAM(s) Oral at bedtime PRN Insomnia  morphine  - Injectable 0.5 milliGRAM(s) IV Push every 4 hours PRN Mild Pain (1 - 3)  morphine  - Injectable 2 milliGRAM(s) IV Push every 4 hours PRN Severe Pain (7 - 10)  morphine  - Injectable 1 milliGRAM(s) IV Push every 4 hours PRN Moderate Pain (4 - 6)  ondansetron Injectable 4 milliGRAM(s) IV Push every 8 hours PRN Nausea and/or Vomiting      Allergies    adhesives (Pruritus; Rash)  No Known Drug Allergies    Intolerances    lactose (Diarrhea)      PHYSICAL EXAM:   Vital Signs:  Vital Signs Last 24 Hrs  T(C): 37.2 (24 Aug 2023 05:27), Max: 37.2 (24 Aug 2023 05:27)  T(F): 99 (24 Aug 2023 05:27), Max: 99 (24 Aug 2023 05:27)  HR: 93 (24 Aug 2023 05:27) (93 - 93)  BP: 135/85 (24 Aug 2023 05:27) (135/85 - 142/67)  BP(mean): --  RR: 18 (24 Aug 2023 05:27) (18 - 18)  SpO2: 93% (24 Aug 2023 05:27) (93% - 93%)    Parameters below as of 24 Aug 2023 05:27  Patient On (Oxygen Delivery Method): room air    GENERAL:  Appears stated age  HEENT:  NC/AT  CHEST:  Full & symmetric excursion  HEART:  Regular rhythm  ABDOMEN:  Soft, non-tender, non-distended  EXTEREMITIES:  no cyanosis  SKIN:  No rash  NEURO:  Alert      LABS:                        9.9    11.55 )-----------( 223      ( 24 Aug 2023 05:50 )             31.0     08-24    136  |  107  |  7   ----------------------------<  72  3.9   |  21<L>  |  0.49<L>    Ca    8.5      24 Aug 2023 05:50    TPro  6.5  /  Alb  2.6<L>  /  TBili  0.5  /  DBili  x   /  AST  39<H>  /  ALT  40  /  AlkPhos  120  08-24      Urinalysis Basic - ( 24 Aug 2023 05:50 )    Color: x / Appearance: x / SG: x / pH: x  Gluc: 72 mg/dL / Ketone: x  / Bili: x / Urobili: x   Blood: x / Protein: x / Nitrite: x   Leuk Esterase: x / RBC: x / WBC x   Sq Epi: x / Non Sq Epi: x / Bacteria: x

## 2023-08-24 NOTE — PROGRESS NOTE ADULT - ATTENDING COMMENTS
82 y/o F w/ PMHx of HLD, osteoarthritis, GERD, depression/anxiety, varicose veins ,Left  TKR  presents w/ sudden onset 10/10 upper abdominal pain since Sunday evening, pain radiation to bilateral lower back & bilateral shoulders. CT A/P : Mild, acute interstitial edematous pancreatitis in the pancreatic tail. CXR : Intrathoracic gastric fundal and body herniation ..RUQ U/S Echogenicity in the region of the gallbladder neck suggests small stones versus gravel. Gallbladder wall thickening identified measuring 7-8 mm. Trace pericholecystic fluid. Correlate for cholecystitis. Admitted for Gall stone  edematous pancreatitis & AC  Cholecystitis, Cholelithiasis  and Intrathoracic gastric fundal and body herniation  Pt seen, Examined, Case & care plan d/w pt, residents at detail.  AM labs   -Pt is Medically Optimized for schedule ERCP in AM   Pt is medically optimized for schedule Cholecystomy tomorrow  -Pre Op & Alanna Op IV Abx as per Sx & ID  -Post op DVT PPX as per Surgeon  Post Op Incentive spirometry    -On Clear Liquid diet   NPO, after midnight   IV Abx ,IVF   Consults:  GI- Dr Mandujano D/W -Plan for ERCP in AM with Lap Shama   ID-DR AMBER Kwok  -IV Vanco -LL Ext Cellulitis, IV Meropenem for AC Shama   D/W Pt & family at Detail.  Total care time is 60 minutes..

## 2023-08-24 NOTE — CARE COORDINATION ASSESSMENT. - NS SW HOME EQUIPMENT
raised toilet seat and grab bars. The patient reported that she got that DME when she had a knee replacement surgery in May./cane/walker

## 2023-08-24 NOTE — PROGRESS NOTE ADULT - ASSESSMENT
82 y/o F w/ PMHx of HLD, osteoarthritis, GERD, depression/anxiety, varicose veins ,Left  TKR  presents w/ sudden onset 10/10 upper abdominal pain since Sunday evening, pain radiation to bilateral lower back & bilateral shoulders. CT A/P : Mild, acute interstitial edematous pancreatitis in the pancreatic tail. CXR : Intrathoracic gastric fundal and body herniation ..RUQ U/S Echogenicity in the region of the gallbladder neck suggests small stones versus gravel. Gallbladder wall thickening identified measuring 7-8 mm. Trace pericholecystic fluid. Correlate for cholecystitis. Admitted for Gall stone  edematous pancreatitis & AC  Cholecystitis, Cholelithiasis  and Intrathoracic gastric fundal and body herniation    Acute Abdominal Pain 2/2 Gallstone Pancreatitis/Cholecystitis  LLE Cellulitis  - CT A/P : Mild, acute interstitial edematous pancreatitis in the pancreatic tail. .  - RUQ U/S Echogenicity in the region of the gallbladder neck suggests small stones versus gravel. Gallbladder wall thickening identified measuring 7-8 mm. Trace pericholecystic fluid. Correlate for cholecystitis.  - elevated LFTs, lipase  - MRCP Acute cholecystitis. Mild pancreatitis. Cystic focus of the pancreatic body measuring up to 8 mm, image 23 series 3 may represent IPMN or sequelae of pancreatitis.  - BCx NGTD    Recommendations:  S/p zosyn, switched to meropenem bc diarrhea  C/w meropenem  Adding vancomycin for LLE, appears same as yesterday    F/u GI PCR (please send)  F/u pending cx    Trend temps/WBC  Trend LFTs/lipase  Serial abdominal exams  Appreciate GI recs  Appreciate surgery recs    D/w Dr. Kwok    Infectious Diseases will continue to follow. Please call with any questions.   Ne Kwok M.D.  [facility] Division of Infectious Diseases [phone]   84 y/o F w/ PMHx of HLD, osteoarthritis, GERD, depression/anxiety, varicose veins ,Left  TKR  presents w/ sudden onset 10/10 upper abdominal pain since Sunday evening, pain radiation to bilateral lower back & bilateral shoulders. CT A/P : Mild, acute interstitial edematous pancreatitis in the pancreatic tail. CXR : Intrathoracic gastric fundal and body herniation ..RUQ U/S Echogenicity in the region of the gallbladder neck suggests small stones versus gravel. Gallbladder wall thickening identified measuring 7-8 mm. Trace pericholecystic fluid. Correlate for cholecystitis. Admitted for Gall stone  edematous pancreatitis & AC  Cholecystitis, Cholelithiasis  and Intrathoracic gastric fundal and body herniation    Acute Abdominal Pain 2/2 Gallstone Pancreatitis/Cholecystitis  LLE Cellulitis  - CT A/P : Mild, acute interstitial edematous pancreatitis in the pancreatic tail. .  - RUQ U/S Echogenicity in the region of the gallbladder neck suggests small stones versus gravel. Gallbladder wall thickening identified measuring 7-8 mm. Trace pericholecystic fluid. Correlate for cholecystitis.  - elevated LFTs, lipase  - MRCP Acute cholecystitis. Mild pancreatitis. Cystic focus of the pancreatic body measuring up to 8 mm, image 23 series 3 may represent IPMN or sequelae of pancreatitis.  - BCx NGTD    Recommendations:  S/p zosyn, switched to meropenem b/c diarrhea  C/w meropenem at this time, pt tolerating  Adding vancomycin for LLE, appears same as yesterday  --dosing per pharmacy protocol    F/u GI PCR (please send)  F/u pending cx  Trend temps/WBC  Trend LFTs/lipase  Serial abdominal exams  Appreciate GI recs  Appreciate surgery recs    Infectious Diseases will continue to follow. Please call with any questions.   Ne Kwok M.D.  [facility] Division of Infectious Diseases [phone]

## 2023-08-24 NOTE — CARE COORDINATION ASSESSMENT. - PRO ARRIVE FROM
The pt lives in a private home with her spouse/Emerson carmona, the pt has to negotiate 4 steps to get inside./home

## 2023-08-24 NOTE — CARE COORDINATION ASSESSMENT. - OTHER PERTINENT DISCHARGE PLANNING INFORMATION:
History of anxiety and depression noted in chart. The patient confirmed that she follows up with a psychiatrist Dr. Zuniga every two weeks. The patient doesn't see a psychotherapist and she declined information at this time.

## 2023-08-24 NOTE — PROGRESS NOTE ADULT - PROBLEM SELECTOR PLAN 3
- Patient presents with mild erythema LL Ext ,+ itching and tender to touch  likely 2/2 LLE cellulitis  - Given 1 x dose IV zosyn , 2L NS Bolus in the ED  - Changed IV zosyn to Meropenem 1 gm q 8 hrs  in the setting of Diarrhea   - f/u Bcx and Ucx, MRSA PCR   - Lower extremity doppler us negative  - WBC trending up  - ID Dr. AMBER Kwok d/w - Patient presents with mild erythema LL Ext ,+ itching and tender to touch  likely 2/2 LLE cellulitis  - Given 1 x dose IV zosyn , 2L NS Bolus in the ED  - Changed IV zosyn to Meropenem 1 gm q 8 hrs  in the setting of Diarrhea   - Bcx and Ucx, NGTD  - MRSA PCR negative   - Lower extremity doppler us negative  - WBC trending up  - ID Dr. AMBER Kwok d/w - Patient presents with mild erythema LL Ext ,+ itching and tender to touch  likely 2/2 LLE cellulitis  - Given 1 x dose IV zosyn , 2L NS Bolus in the ED  - c/w Meropenem 1 gm q 8 hrs  in the setting of Diarrhea   - IV Vancomycin started on 8/24 because of lack of improvement  - Bcx and Ucx, NGTD  - MRSA PCR negative   - Lower extremity doppler us negative  - WBC trending up  - ID Dr. AMBER Kwok d/w

## 2023-08-24 NOTE — PROGRESS NOTE ADULT - PROBLEM SELECTOR PLAN 5
splenic artery aneurysm "near completely peripherally calcified 1.1cm" seen ct a/p  - Vascular (Dr. Rubio) consulted  - No acute intervention at this time splenic artery aneurysm "near completely peripherally calcified 1.1cm" seen ct a/p  - Varicose veins seen more on right lower extremity   - Vascular (Dr. Rubio) consulted  - No acute intervention at this time splenic artery aneurysm "near completely peripherally calcified 1.1cm" seen ct a/p  - Varicose veins seen more on right lower extremity   - Vascular (Dr. Rubio)- NEG DVT - No Vascular intervention   - No acute intervention at this time

## 2023-08-24 NOTE — PROGRESS NOTE ADULT - SUBJECTIVE AND OBJECTIVE BOX
[facility], Division of Infectious Diseases  [facility]el, S[facility], [facility]  [phone]    Name: HOMAR DRAKE  Age: 83y  Gender: Female  MRN: [id number]    Interval History:  Patient seen and examined at bedside   No acute overnight events. Afebrile  Still having diarrhea but slightly better than yesterday  Notes reviewed    Antibiotics:  meropenem  IVPB 1000 milliGRAM(s) IV Intermittent every 8 hours      Medications:  acetaminophen     Tablet .. 650 milliGRAM(s) Oral every 6 hours PRN  aspirin enteric coated 81 milliGRAM(s) Oral daily  atorvastatin 80 milliGRAM(s) Oral at bedtime  cholecalciferol 1000 Unit(s) Oral daily  enoxaparin Injectable 40 milliGRAM(s) SubCutaneous every 24 hours  escitalopram 20 milliGRAM(s) Oral daily  lactobacillus acidophilus 1 Tablet(s) Oral daily  melatonin 3 milliGRAM(s) Oral at bedtime PRN  meropenem  IVPB 1000 milliGRAM(s) IV Intermittent every 8 hours  morphine  - Injectable 0.5 milliGRAM(s) IV Push every 4 hours PRN  morphine  - Injectable 2 milliGRAM(s) IV Push every 4 hours PRN  morphine  - Injectable 1 milliGRAM(s) IV Push every 4 hours PRN  ondansetron Injectable 4 milliGRAM(s) IV Push every 8 hours PRN  OXcarbazepine 300 milliGRAM(s) Oral two times a day  pantoprazole    Tablet 40 milliGRAM(s) Oral before breakfast  sodium chloride 0.9%. 1000 milliLiter(s) IV Continuous <Continuous>      Review of Systems:    Review of systems otherwise negative except as previously noted.    Allergies: adhesives (Pruritus; Rash)  No Known Drug Allergies    For details regarding the patient's past medical history, social history, family history, and other miscellaneous elements, please refer the initial infectious diseases consultation and/or the admitting history and physical examination for this admission.    Objective:  Vitals:   T(C): 37.2 (08-24-23 @ 05:27), Max: 37.2 (08-24-23 @ 05:27)  HR: 93 (08-24-23 @ 05:27) (83 - 93)  BP: 135/85 (08-24-23 @ 05:27) (114/68 - 142/67)  RR: 18 (08-24-23 @ 05:27) (18 - 18)  SpO2: 93% (08-24-23 @ 05:27) (93% - 93%)    Physical Examination:  General: no acute distress  HEENT: NC/AT, EOMI,  Cardio: RRR  Resp: symmetric chest rise  Abd: soft, NT, ND  Neuro: no obvious focal deficits  Ext: no edema or cyanosis, LLE redness and erythema  Skin: warm, dry, no visible rash      Laboratory Studies:  CBC:                       9.9    11.55 )-----------( 223      ( 24 Aug 2023 05:50 )             31.0     CMP: 08-24    136  |  107  |  7   ----------------------------<  72  3.9   |  21<L>  |  0.49<L>    Ca    8.5      24 Aug 2023 05:50    TPro  6.5  /  Alb  2.6<L>  /  TBili  0.5  /  DBili  x   /  AST  39<H>  /  ALT  40  /  AlkPhos  120  08-24    LIVER FUNCTIONS - ( 24 Aug 2023 05:50 )  Alb: 2.6 g/dL / Pro: 6.5 g/dL / ALK PHOS: 120 U/L / ALT: 40 U/L / AST: 39 U/L / GGT: x           Urinalysis Basic - ( 24 Aug 2023 05:50 )    Color: x / Appearance: x / SG: x / pH: x  Gluc: 72 mg/dL / Ketone: x  / Bili: x / Urobili: x   Blood: x / Protein: x / Nitrite: x   Leuk Esterase: x / RBC: x / WBC x   Sq Epi: x / Non Sq Epi: x / Bacteria: x        Microbiology: reviewed    Culture - Blood (collected 08-22-23 @ 22:22)  Source: .Blood Blood-Peripheral  Preliminary Report (08-24-23 @ 02:02):    No growth at 24 hours    Culture - Blood (collected 08-22-23 @ 21:50)  Source: .Blood Blood-Peripheral  Preliminary Report (08-24-23 @ 02:02):    No growth at 24 hours          Radiology: reviewed    < from: MR MRCP w/wo IV Cont (08.23.23 @ 13:17) >    ACC: [id number] EXAM:  MR MRCP [facility]AW IC   ORDERED BY: MARVIN CHAPMAN     PROCEDURE DATE:  08/23/2023          INTERPRETATION:  CLINICAL INFORMATION: Pancreatitis, cholecystitis, rule   out choledocholithiasis.    COMPARISON: CT abdomen pelvis 8/22/2023. Right upper quadrant ultrasound   8/22/2023.    CONTRAST/COMPLICATIONS:  IV Contrast: Gadavist  7 cc administered   .5 cc discarded  Oral Contrast: NONE  Complications: None reported at time of study completion    PROCEDURE:  MRI of the abdomen wasperformed.  MRCP was performed.    FINDINGS:    LOWER CHEST: Trace pleural effusions. Probable dependent atelectasis of   the lung parenchyma, suboptimally assessed on MRI. Moderate hiatal hernia.    Motion limited evaluation.    LIVER: Normal size. Main portal vein and hepatic veins are patent.  BILE DUCTS: No biliary distention. Mid/distal choledocholithiasis of the   CBD, images 25-29 series 4 and image 17 series 2. Stones are not seen on   all sequences, likely due to small size and artifact.  GALLBLADDER: Gallbladder distention, cholelithiasis, and prominent wall   edema, concerning for acute cholecystitis.  SPLEEN: Spleen size within normal limits  PANCREAS: Mild peripancreatic inflammatory changes, which may reflect   acute pancreatitis. Cystic focus of the pancreatic body measuring up to 8   mm, image 23 series 3 may represent IPMN or sequelae of pancreatitis.  ADRENALS: Unremarkable  KIDNEYS/URETERS: No hydronephrosis. Renal cysts, few which are   proteinaceous/hemorrhagic.    VISUALIZED PORTIONS:  BOWEL: Moderate hiatal hernia. No bowel distention.  PERITONEUM: Trace fluid  VESSELS: No abdominal aortic aneurysm  RETROPERITONEUM/LYMPH NODES: Small volume nodes  ABDOMINAL WALL: Unremarkable  BONES: Scoliotic spinal curvature and degenerative changes, suboptimally   characterized on abdominal protocol MRI.    IMPRESSION:    Motion limited evaluation.    Acute cholecystitis. Mild pancreatitis. Cystic focus of the pancreatic   body measuring up to 8 mm, image 23 series 3 may represent IPMN or   sequelae of pancreatitis.    No biliary distention. Mid/distal choledocholithiasis of the CBD, images   25-29 series 4 and image 17 series 2. Stones are not seen on all   sequences, likely due to small size and artifact.    Trace pleural effusions. Probable dependent atelectasis of the lung   parenchyma, suboptimally assessed on MRI. Moderate hiatal hernia.    --- End of Report ---            ALEJANDRA JACKSON M.D., ATTENDING RADIOLOGIST  This document has been electronically signed. Aug 23 2023  2:47PM    < end of copied text >     [facility], Division of Infectious Diseases  [facility] Y. Patel, S. [facility], [facility]  [phone]    Name: HOMAR DRAKE  Age: 83y  Gender: Female  MRN: [id number]    Interval History:  Patient seen and examined at bedside   No acute overnight events. Afebrile  Still having diarrhea but slightly better than yesterday  Notes reviewed    Antibiotics:  meropenem  IVPB 1000 milliGRAM(s) IV Intermittent every 8 hours      Medications:  acetaminophen     Tablet .. 650 milliGRAM(s) Oral every 6 hours PRN  aspirin enteric coated 81 milliGRAM(s) Oral daily  atorvastatin 80 milliGRAM(s) Oral at bedtime  cholecalciferol 1000 Unit(s) Oral daily  enoxaparin Injectable 40 milliGRAM(s) SubCutaneous every 24 hours  escitalopram 20 milliGRAM(s) Oral daily  lactobacillus acidophilus 1 Tablet(s) Oral daily  melatonin 3 milliGRAM(s) Oral at bedtime PRN  meropenem  IVPB 1000 milliGRAM(s) IV Intermittent every 8 hours  morphine  - Injectable 0.5 milliGRAM(s) IV Push every 4 hours PRN  morphine  - Injectable 2 milliGRAM(s) IV Push every 4 hours PRN  morphine  - Injectable 1 milliGRAM(s) IV Push every 4 hours PRN  ondansetron Injectable 4 milliGRAM(s) IV Push every 8 hours PRN  OXcarbazepine 300 milliGRAM(s) Oral two times a day  pantoprazole    Tablet 40 milliGRAM(s) Oral before breakfast  sodium chloride 0.9%. 1000 milliLiter(s) IV Continuous <Continuous>      Review of Systems:    Review of systems otherwise negative except as previously noted.    Allergies: adhesives (Pruritus; Rash)  No Known Drug Allergies    For details regarding the patient's past medical history, social history, family history, and other miscellaneous elements, please refer the initial infectious diseases consultation and/or the admitting history and physical examination for this admission.    Objective:  Vitals:   T(C): 37.2 (08-24-23 @ 05:27), Max: 37.2 (08-24-23 @ 05:27)  HR: 93 (08-24-23 @ 05:27) (83 - 93)  BP: 135/85 (08-24-23 @ 05:27) (114/68 - 142/67)  RR: 18 (08-24-23 @ 05:27) (18 - 18)  SpO2: 93% (08-24-23 @ 05:27) (93% - 93%)    Physical Examination:  General: no acute distress  HEENT: NC/AT, EOMI,  Cardio: RRR  Resp: symmetric chest rise, no use of resp acc muscles  Abd: soft, NT, ND  Neuro: no obvious focal deficits  Ext: no edema or cyanosis, LLE redness and erythema, appears same from yesterday  Skin: warm, dry, no visible rash      Laboratory Studies:  CBC:                       9.9    11.55 )-----------( 223      ( 24 Aug 2023 05:50 )             31.0     CMP: 08-24    136  |  107  |  7   ----------------------------<  72  3.9   |  21<L>  |  0.49<L>    Ca    8.5      24 Aug 2023 05:50    TPro  6.5  /  Alb  2.6<L>  /  TBili  0.5  /  DBili  x   /  AST  39<H>  /  ALT  40  /  AlkPhos  120  08-24    LIVER FUNCTIONS - ( 24 Aug 2023 05:50 )  Alb: 2.6 g/dL / Pro: 6.5 g/dL / ALK PHOS: 120 U/L / ALT: 40 U/L / AST: 39 U/L / GGT: x           Urinalysis Basic - ( 24 Aug 2023 05:50 )    Color: x / Appearance: x / SG: x / pH: x  Gluc: 72 mg/dL / Ketone: x  / Bili: x / Urobili: x   Blood: x / Protein: x / Nitrite: x   Leuk Esterase: x / RBC: x / WBC x   Sq Epi: x / Non Sq Epi: x / Bacteria: x        Microbiology: reviewed    Culture - Blood (collected 08-22-23 @ 22:22)  Source: .Blood Blood-Peripheral  Preliminary Report (08-24-23 @ 02:02):    No growth at 24 hours    Culture - Blood (collected 08-22-23 @ 21:50)  Source: .Blood Blood-Peripheral  Preliminary Report (08-24-23 @ 02:02):    No growth at 24 hours          Radiology: reviewed    < from: MR MRCP w/wo IV Cont (08.23.23 @ 13:17) >    ACC: [id number] EXAM:  MR MRCP WAW IC   ORDERED BY: MARVIN CHAPMAN     PROCEDURE DATE:  08/23/2023          INTERPRETATION:  CLINICAL INFORMATION: Pancreatitis, cholecystitis, rule   out choledocholithiasis.    COMPARISON: CT abdomen pelvis 8/22/2023. Right upper quadrant ultrasound   8/22/2023.    CONTRAST/COMPLICATIONS:  IV Contrast: Gadavist  7 cc administered   .5 cc discarded  Oral Contrast: NONE  Complications: None reported at time of study completion    PROCEDURE:  MRI of the abdomen wasperformed.  MRCP was performed.    FINDINGS:    LOWER CHEST: Trace pleural effusions. Probable dependent atelectasis of   the lung parenchyma, suboptimally assessed on MRI. Moderate hiatal hernia.    Motion limited evaluation.    LIVER: Normal size. Main portal vein and hepatic veins are patent.  BILE DUCTS: No biliary distention. Mid/distal choledocholithiasis of the   CBD, images 25-29 series 4 and image 17 series 2. Stones are not seen on   all sequences, likely due to small size and artifact.  GALLBLADDER: Gallbladder distention, cholelithiasis, and prominent wall   edema, concerning for acute cholecystitis.  SPLEEN: Spleen size within normal limits  PANCREAS: Mild peripancreatic inflammatory changes, which may reflect   acute pancreatitis. Cystic focus of the pancreatic body measuring up to 8   mm, image 23 series 3 may represent IPMN or sequelae of pancreatitis.  ADRENALS: Unremarkable  KIDNEYS/URETERS: No hydronephrosis. Renal cysts, few which are   proteinaceous/hemorrhagic.    VISUALIZED PORTIONS:  BOWEL: Moderate hiatal hernia. No bowel distention.  PERITONEUM: Trace fluid  VESSELS: No abdominal aortic aneurysm  RETROPERITONEUM/LYMPH NODES: Small volume nodes  ABDOMINAL WALL: Unremarkable  BONES: Scoliotic spinal curvature and degenerative changes, suboptimally   characterized on abdominal protocol MRI.    IMPRESSION:    Motion limited evaluation.    Acute cholecystitis. Mild pancreatitis. Cystic focus of the pancreatic   body measuring up to 8 mm, image 23 series 3 may represent IPMN or   sequelae of pancreatitis.    No biliary distention. Mid/distal choledocholithiasis of the CBD, images   25-29 series 4 and image 17 series 2. Stones are not seen on all   sequences, likely due to small size and artifact.    Trace pleural effusions. Probable dependent atelectasis of the lung   parenchyma, suboptimally assessed on MRI. Moderate hiatal hernia.    --- End of Report ---            ALEJANDRA JACKSON M.D., ATTENDING RADIOLOGIST  This document has been electronically signed. Aug 23 2023  2:47PM    < end of copied text >

## 2023-08-24 NOTE — PROGRESS NOTE ADULT - PROBLEM SELECTOR PLAN 1
- S/P  abdominal pain, n/v,/ d + lipase - 2483 l  -  acute pancreatitis.- -Gall stone Pancreatitis   - CT A/P : Mild, acute interstitial edematous pancreatitis in the pancreatic tail. .  - RUQ U/S Echogenicity in the region of the gallbladder neck suggests small stones versus gravel. Gallbladder wall thickening identified measuring 7-8 mm. Trace pericholecystic fluid. Correlate for cholecystitis.  - MRCP as above + Diastal CBD stone   - IVF NS @100 cc/h--> 75 ml/hr   - f/up lipid panel   - Lipase improving  - GI (Dr Mandujano d/w - Pt will need ERCP  -On IV Abx as per ID - S/P  abdominal pain, n/v,/ d + lipase - 2483 l  -  acute pancreatitis.- -Gall stone Pancreatitis   - CT A/P : Mild, acute interstitial edematous pancreatitis in the pancreatic tail. .  - RUQ U/S Echogenicity in the region of the gallbladder neck suggests small stones versus gravel. Gallbladder wall thickening identified measuring 7-8 mm. Trace pericholecystic fluid. Correlate for cholecystitis.  - MRCP as above + Distal CBD stone   - IVF NS @100 cc/h--> 75 ml/hr   - f/up lipid panel   - Lipase improving  - GI (Dr Mandujano d/w - Pt will need ERCP likely 8/25  -On IV Abx as per ID - S/P  abdominal pain, n/v,/ d + lipase - 2483 l  -  acute pancreatitis.- -Gall stone Pancreatitis   - CT A/P : Mild, acute interstitial edematous pancreatitis in the pancreatic tail. .  - RUQ U/S Echogenicity in the region of the gallbladder neck suggests small stones versus gravel. Gallbladder wall thickening identified measuring 7-8 mm. Trace pericholecystic fluid. Correlate for cholecystitis.  - MRCP as above + Distal CBD stone   - IVF NS @100 cc/h--> 75 ml/hr   - f/up lipid panel   - Lipase improving  - GI (Dr Mandujano d/w - Pt will need ERCP and robotic virgil 8/25  -On IV Abx as per ID - S/P  abdominal pain, n/v,/ d + lipase - 2483 l  -  acute pancreatitis.- -Gall stone Pancreatitis   - CT A/P : Mild, acute interstitial edematous pancreatitis in the pancreatic tail. .  - RUQ U/S Echogenicity in the region of the gallbladder neck suggests small stones versus gravel. Gallbladder wall thickening identified measuring 7-8 mm. Trace pericholecystic fluid. Correlate for cholecystitis.  - MRCP as above + Distal CBD stone   - IVF NS @100 cc/h--> 75 ml/hr   - f/up lipid panel   - Lipase improving  - GI (Dr Mandujano d/w - Pt will need ERCP and robotic virgil 8/25  -On IV Abx as per ID- IV Vanco , IV Meropenem 1 gm q 8 hrs

## 2023-08-24 NOTE — PROGRESS NOTE ADULT - SUBJECTIVE AND OBJECTIVE BOX
Patient is a 83y old  Female who presents with a chief complaint of cholecystitis, acute interstitial edematous pancreatitis.  Intrathoracic gastric fundal and body herniation (23 Aug 2023 14:01)    HPI:  84 y/o F w/ PMHx of HLD, osteoarthritis, GERD, depression/anxiety, varicose veins, B/L TKR  presents w/ sudden onset 10/10 upper abdominal pain since Sunday evening, pain radiation to bilateral lower back & bilateral shoulders. States  the pain was accompanied w/ nausea and several episodes of emesis that same night. She has had not appetite since then and has not eaten since Sunday night. Admits to chills, weakness, fatigue, nausea, abd bloating and several episodes of diarrhea since arrival to the ED. Denies headaches, chest pain, palpitations, shortness of breath, acid reflux, vomiting or urinary sx. Reports no past hx of pancreatitis. No hx of cancer.   Last colonoscopy was around 5 years ago, family hx of Crohn's - sister and granddaughter. Denies any recent alcohol use, drug abuse.     IN ED,  VITALS /69 HR 94 RR 20 Temp 98F on RA   PERTINENT LABS: Wbc 12k 82% predominant neutrophils, hb 11.3 RDW index 17.5   K 3.4 Glucose 106 AST 44 ALK po4 146  Lipase 2483  CT A/P : Mild, acute interstitial edematous pancreatitis in the pancreatic tail.  No discrete fluid collections. Edematous gallbladder wall and increased enhancement. The finding may be   secondary the presence of inflammation in the pancreas and  versus acute  cholecystitis. Recommend right upper quadrant abdominal ultrasound for  further evaluation and correlation.  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    Focus of air in the bladder. Correlate with instrumentation versus   infection. Correlate with urinalysis.    CXR : Intrathoracic gastric fundal and body herniation. Please see lower chest images of abdominal CT scan 8/22/2023. No airspace consolidation.    RUQ U/S Echogenicity in the region of the gallbladder neck suggests small stones versus gravel. Gallbladder wall thickening identified measuring 7-8 mm. Trace pericholecystic fluid. Correlate for cholecystitis.  s/ p  1X iv Tylenol zosyn, potassium 40mg X1,  2L NS in ER   EKG shows NSR 87 bpm QTc 462  (22 Aug 2023 20:04)    INTERVAL HPI:  8/23: Patient was seen and examined at bedside. They state they have been experiencing many loose BMs (>8 in the last 24 hours), loose BMs started prior to admission. Patient still has constant epigastric pain now at a 6/10. Patient denies HA, F/C, CP,SOB,Abd pain, dysuria, leg swelling, or leg pain. Going for MRCP today.  8/24: Patient was seen and examined at bedside. They state they have been having epigastric pain, radiating to the shoulder and back consistently still. Rated the pain as 8/10 at the worst, 6/10 when sleeping. She is continuing to have loose bowel movements, and states she had 4 loose bowel movements overnight with tenesmus. Patient states she has itching of her L lower extremity and trouble swallowing with a dry throat. Denies CP, SOB, dysuria, numbness and tingling of the lower extremities. Will be going for ERCP tomorrow.     OVERNIGHT EVENTS:    Home Medications:  celecoxib 200 mg oral capsule: 1 cap(s) orally every 12 hours (22 Aug 2023 19:52)  Crestor 40 mg oral tablet: 1 tab(s) orally once a day (at bedtime) (22 Aug 2023 19:52)  Ecotrin Adult Low Strength 81 mg oral delayed release tablet: 1 tab(s) orally every 12 hours (22 Aug 2023 19:52)  Lexapro 20 mg oral tablet: 1 tab(s) orally once a day (22 Aug 2023 19:52)  pantoprazole 40 mg oral delayed release tablet: 1 tab(s) orally once a day (before a meal) (22 Aug 2023 19:52)  Trileptal 300 mg oral tablet: 1 tab(s) orally 2 times a day (22 Aug 2023 19:52)  Vitamin D3 1000 intl units (25 mcg) oral tablet: 1 tab(s) orally once a day (22 Aug 2023 19:52)      MEDICATIONS  (STANDING):  aspirin enteric coated 81 milliGRAM(s) Oral daily  atorvastatin 80 milliGRAM(s) Oral at bedtime  cholecalciferol 1000 Unit(s) Oral daily  enoxaparin Injectable 40 milliGRAM(s) SubCutaneous every 24 hours  escitalopram 20 milliGRAM(s) Oral daily  lactobacillus acidophilus 1 Tablet(s) Oral daily  meropenem  IVPB 1000 milliGRAM(s) IV Intermittent every 8 hours  OXcarbazepine 300 milliGRAM(s) Oral two times a day  pantoprazole    Tablet 40 milliGRAM(s) Oral before breakfast  sodium chloride 0.9%. 1000 milliLiter(s) (75 mL/Hr) IV Continuous <Continuous>    MEDICATIONS  (PRN):  acetaminophen     Tablet .. 650 milliGRAM(s) Oral every 6 hours PRN Temp greater or equal to 38C (100.4F), Mild Pain (1 - 3)  melatonin 3 milliGRAM(s) Oral at bedtime PRN Insomnia  morphine  - Injectable 0.5 milliGRAM(s) IV Push every 4 hours PRN Mild Pain (1 - 3)  morphine  - Injectable 2 milliGRAM(s) IV Push every 4 hours PRN Severe Pain (7 - 10)  morphine  - Injectable 1 milliGRAM(s) IV Push every 4 hours PRN Moderate Pain (4 - 6)  ondansetron Injectable 4 milliGRAM(s) IV Push every 8 hours PRN Nausea and/or Vomiting      Allergies    adhesives (Pruritus; Rash)  No Known Drug Allergies    Intolerances    lactose (Diarrhea)      Social History:  Tobacco:  denies  EtOH: denies   Recreational drug use: denies   Lives with: family   Ambulates: with walker (22 Aug 2023 20:04)      REVIEW OF SYSTEMS:  CONSTITUTIONAL: No fever, No chills, No fatigue, No myalgia, No Body ache, No Weakness  EYES: No eye pain,  No visual disturbances, No discharge, NO Redness  ENMT:  No ear pain, No nose bleed, No vertigo; No sinus pain, + throat pain, No Congestion, +dry throat  NECK: No pain, No stiffness  RESPIRATORY: No cough, NO wheezing, No  hemoptysis, NO  shortness of breath  CARDIOVASCULAR: + chest pain, palpitations  GASTROINTESTINAL: + abdominal pain radiating to back and shoulders, + epigastric pain. + nausea, No vomiting; + diarrhea, No constipation. [  ] BM  GENITOURINARY: No dysuria, No frequency, No urgency, No hematuria, NO incontinence  NEUROLOGICAL: No headaches, No dizziness, No numbness, No tingling, No tremors, No weakness  EXT: + L lower extremity Swelling, No Pain, No Edema  SKIN:  [ X ] + itching and rash L LE,  no burning or lesions   MUSCULOSKELETAL: No joint pain ,No Jt swelling; No muscle pain, No back pain, No extremity pain  PSYCHIATRIC: No depression,  No anxiety,  No mood swings ,No difficulty sleeping at night  PAIN SCALE: [  ] None  [  ] Other-Moderate pain, 8/10  ROS Unable to obtain due to - [  ] Dementia  [  ] Lethargy [  ] Drowsy [  ] Sedated [  ] non verbal  REST OF REVIEW Of SYSTEM - [ X ] Normal     Vital Signs Last 24 Hrs  T(C): 37.2 (24 Aug 2023 05:27), Max: 37.2 (24 Aug 2023 05:27)  T(F): 99 (24 Aug 2023 05:27), Max: 99 (24 Aug 2023 05:27)  HR: 93 (24 Aug 2023 05:27) (83 - 93)  BP: 135/85 (24 Aug 2023 05:27) (114/68 - 142/67)  BP(mean): --  RR: 18 (24 Aug 2023 05:27) (18 - 18)  SpO2: 93% (24 Aug 2023 05:27) (93% - 93%)    Parameters below as of 24 Aug 2023 05:27  Patient On (Oxygen Delivery Method): room air      Finger Stick        08-23 @ 07:01  -  08-24 @ 07:00  --------------------------------------------------------  IN: 1000 mL / OUT: 200 mL / NET: 800 mL        PHYSICAL EXAM:  GENERAL:  [ X ] NAD , [ X ] well appearing, [  ] Agitated, [  ] Drowsy,  [  ] Lethargy, [  ] confused   HEAD:  [ X ] Normal, [  ] Other  EYES:  [  ] EOMI, [  ] PERRLA, [ X ] conjunctiva and sclera clear normal, [  ] Other,  [  ] Pallor,[  ] Discharge  ENMT:  [ X ] Normal, [  ] Moist mucous membranes, [  ] Good dentition, [  ] No Thrush  NECK:  [ X ] Supple, [ X ] No JVD, [  ] Normal thyroid, [  ] Lymphadenopathy [  ] Other  CHEST/LUNG:  [ X ] Clear to auscultation bilaterally, [ X ] Breath Sounds equal B/L, [  ] poor effort  [ X ] No rales, [ X ] No rhonchi  [ X ]  No wheezing,   HEART:  [ X ] Regular rate and rhythm, [  ] tachycardia, [  ] Bradycardia,  [  ] irregular  [ X ] No murmurs, No rubs, No gallops, [  ] PPM in place (Mfr:  )  ABDOMEN:  [ X ] Soft, [  ] Nontender, [  ] Nondistended, [ X ] No mass, [ X ] Bowel sounds present, [  ] obese  NERVOUS SYSTEM:  [ X ] Alert & Oriented X3, [  ] Nonfocal  [  ] Confusion  [  ] Encephalopathic [  ] Sedated [  ] Unable to assess, [  ] Dementia [  ] Other-  EXTREMITIES: [ X ] 2+ Peripheral Pulses, No clubbing, No cyanosis,  [  ] edema B/L lower EXT. [ X ] PVD stasis skin changes B/L Lower EXT, [  ] wound  LYMPH: No lymphadenopathy noted  SKIN:  [  ] No rashes or lesions, [  ] Pressure Ulcers, [  ] ecchymosis, [  ] Skin Tears, [ X ] + Left Lower Ext shin Anterior skin erythema + warm , No Open wound    DIET: Diet, Clear Liquid (08-23-23 @ 15:18)      LABS:                        9.9    11.55 )-----------( 223      ( 24 Aug 2023 05:50 )             31.0     24 Aug 2023 05:50    136    |  107    |  7      ----------------------------<  72     3.9     |  21     |  0.49     Ca    8.5        24 Aug 2023 05:50    TPro  6.5    /  Alb  2.6    /  TBili  0.5    /  DBili  x      /  AST  39     /  ALT  40     /  AlkPhos  120    24 Aug 2023 05:50      Urinalysis Basic - ( 24 Aug 2023 05:50 )    Color: x / Appearance: x / SG: x / pH: x  Gluc: 72 mg/dL / Ketone: x  / Bili: x / Urobili: x   Blood: x / Protein: x / Nitrite: x   Leuk Esterase: x / RBC: x / WBC x   Sq Epi: x / Non Sq Epi: x / Bacteria: x        Culture Results:   No growth at 24 hours (08-22 @ 22:22)  Culture Results:   No growth at 24 hours (08-22 @ 21:50)      culture blood  -- .Blood Blood-Peripheral 08-22 @ 22:22    culture urine  --  08-22 @ 22:22  culture blood  -- .Blood Blood-Peripheral 08-22 @ 21:50    culture urine  --  08-22 @ 21:50              Culture - Blood (collected 22 Aug 2023 22:22)  Source: .Blood Blood-Peripheral  Preliminary Report (24 Aug 2023 02:02):    No growth at 24 hours    Culture - Blood (collected 22 Aug 2023 21:50)  Source: .Blood Blood-Peripheral  Preliminary Report (24 Aug 2023 02:02):    No growth at 24 hours         Anemia Panel:      Thyroid Panel:        Lipase: 271 U/L (08-23-23 @ 08:06)  Amylase: 128 U/L (08-23-23 @ 08:06)  Lipase: 2483 U/L (08-22-23 @ 09:58)          RADIOLOGY & ADDITIONAL TESTS:  < from: MR MRCP w/wo IV Cont (08.23.23 @ 13:17) >  IMPRESSION:    Motion limited evaluation.    Acute cholecystitis. Mild pancreatitis. Cystic focus of the pancreatic   body measuring up to 8 mm, image 23 series 3 may represent IPMN or   sequelae of pancreatitis.    No biliary distention. Mid/distal choledocholithiasis of the CBD, images   25-29 series 4 and image 17 series 2. Stones are not seen on all   sequences, likely due to small size and artifact.    Trace pleural effusions. Probable dependent atelectasis of the lung   parenchyma, suboptimally assessed on MRI. Moderate hiatal hernia.    < end of copied text >      < from: US Duplex Venous Lower Ext Complete, Bilateral (08.23.23 @ 10:35) >  IMPRESSION:  No evidence of deep venous thrombosis in either lower extremity.    < end of copied text >        HEALTH ISSUES - PROBLEM Dx:  Abdominal pain    Anxiety and depression    Left leg cellulitis    Need for prophylactic measure    Pancreatitis    Cholecystitis    Abnormal finding on CT scan            Consultant(s) Notes Reviewed:  [ X ] YES     Care Discussed with [X] Consultants  [ X ] Patient  [  ] Family [  ] HCP [  ]   [  ] Social Service  [  ] RN, [  ] Physical Therapy,[  ] Palliative care team  DVT PPX: [ X ] Lovenox, [  ] S C Heparin, [  ] Coumadin, [  ] Xarelto, [  ] Eliquis, [  ] Pradaxa, [  ] IV Heparin drip, [  ] SCD [  ] Contraindication 2 to GI Bleed,[  ] Ambulation [  ] Contraindicated 2 to  bleed [  ] Contraindicated 2 to Brain Bleed  Advanced directive: [ X ] None, [  ] DNR/DNI Patient is a 83y old  Female who presents with a chief complaint of cholecystitis, acute interstitial edematous pancreatitis.  Intrathoracic gastric fundal and body herniation (23 Aug 2023 14:01)    HPI:  82 y/o F w/ PMHx of HLD, osteoarthritis, GERD, depression/anxiety, varicose veins, B/L TKR  presents w/ sudden onset 10/10 upper abdominal pain since Sunday evening, pain radiation to bilateral lower back & bilateral shoulders. States  the pain was accompanied w/ nausea and several episodes of emesis that same night. She has had not appetite since then and has not eaten since Sunday night. Admits to chills, weakness, fatigue, nausea, abd bloating and several episodes of diarrhea since arrival to the ED. Denies headaches, chest pain, palpitations, shortness of breath, acid reflux, vomiting or urinary sx. Reports no past hx of pancreatitis. No hx of cancer.   Last colonoscopy was around 5 years ago, family hx of Crohn's - sister and granddaughter. Denies any recent alcohol use, drug abuse.     IN ED,  VITALS /69 HR 94 RR 20 Temp 98F on RA   PERTINENT LABS: Wbc 12k 82% predominant neutrophils, hb 11.3 RDW index 17.5   K 3.4 Glucose 106 AST 44 ALK po4 146  Lipase 2483  CT A/P : Mild, acute interstitial edematous pancreatitis in the pancreatic tail.  No discrete fluid collections. Edematous gallbladder wall and increased enhancement. The finding may be   secondary the presence of inflammation in the pancreas and  versus acute  cholecystitis. Recommend right upper quadrant abdominal ultrasound for  further evaluation and correlation.  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    Focus of air in the bladder. Correlate with instrumentation versus   infection. Correlate with urinalysis.    CXR : Intrathoracic gastric fundal and body herniation. Please see lower chest images of abdominal CT scan 8/22/2023. No airspace consolidation.    RUQ U/S Echogenicity in the region of the gallbladder neck suggests small stones versus gravel. Gallbladder wall thickening identified measuring 7-8 mm. Trace pericholecystic fluid. Correlate for cholecystitis.  s/ p  1X iv Tylenol zosyn, potassium 40mg X1,  2L NS in ER   EKG shows NSR 87 bpm QTc 462  (22 Aug 2023 20:04)    INTERVAL HPI:  8/23: Patient was seen and examined at bedside. They state they have been experiencing many loose BMs (>8 in the last 24 hours), loose BMs started prior to admission. Patient still has constant epigastric pain now at a 6/10. Patient denies HA, F/C, CP,SOB,Abd pain, dysuria, leg swelling, or leg pain. Going for MRCP today.  8/24: Patient was seen and examined at bedside. They state they have been having epigastric pain, radiating to the shoulder and back consistently still. Rated the pain as 8/10 at the worst, 6/10 when sleeping. She is continuing to have loose bowel movements, and states she had 4 loose bowel movements overnight with tenesmus. Patient states she has itching of her L lower extremity and trouble swallowing with a dry throat. Denies CP, SOB, dysuria, numbness and tingling of the lower extremities. Will be going for ERCP tomorrow.     OVERNIGHT EVENTS:    Home Medications:  celecoxib 200 mg oral capsule: 1 cap(s) orally every 12 hours (22 Aug 2023 19:52)  Crestor 40 mg oral tablet: 1 tab(s) orally once a day (at bedtime) (22 Aug 2023 19:52)  Ecotrin Adult Low Strength 81 mg oral delayed release tablet: 1 tab(s) orally every 12 hours (22 Aug 2023 19:52)  Lexapro 20 mg oral tablet: 1 tab(s) orally once a day (22 Aug 2023 19:52)  pantoprazole 40 mg oral delayed release tablet: 1 tab(s) orally once a day (before a meal) (22 Aug 2023 19:52)  Trileptal 300 mg oral tablet: 1 tab(s) orally 2 times a day (22 Aug 2023 19:52)  Vitamin D3 1000 intl units (25 mcg) oral tablet: 1 tab(s) orally once a day (22 Aug 2023 19:52)      MEDICATIONS  (STANDING):  aspirin enteric coated 81 milliGRAM(s) Oral daily  atorvastatin 80 milliGRAM(s) Oral at bedtime  cholecalciferol 1000 Unit(s) Oral daily  enoxaparin Injectable 40 milliGRAM(s) SubCutaneous every 24 hours  escitalopram 20 milliGRAM(s) Oral daily  lactobacillus acidophilus 1 Tablet(s) Oral daily  meropenem  IVPB 1000 milliGRAM(s) IV Intermittent every 8 hours  OXcarbazepine 300 milliGRAM(s) Oral two times a day  pantoprazole    Tablet 40 milliGRAM(s) Oral before breakfast  sodium chloride 0.9%. 1000 milliLiter(s) (75 mL/Hr) IV Continuous <Continuous>    MEDICATIONS  (PRN):  acetaminophen     Tablet .. 650 milliGRAM(s) Oral every 6 hours PRN Temp greater or equal to 38C (100.4F), Mild Pain (1 - 3)  melatonin 3 milliGRAM(s) Oral at bedtime PRN Insomnia  morphine  - Injectable 0.5 milliGRAM(s) IV Push every 4 hours PRN Mild Pain (1 - 3)  morphine  - Injectable 2 milliGRAM(s) IV Push every 4 hours PRN Severe Pain (7 - 10)  morphine  - Injectable 1 milliGRAM(s) IV Push every 4 hours PRN Moderate Pain (4 - 6)  ondansetron Injectable 4 milliGRAM(s) IV Push every 8 hours PRN Nausea and/or Vomiting      Allergies    adhesives (Pruritus; Rash)  No Known Drug Allergies    Intolerances    lactose (Diarrhea)      Social History:  Tobacco:  denies  EtOH: denies   Recreational drug use: denies   Lives with: family   Ambulates: with walker (22 Aug 2023 20:04)      REVIEW OF SYSTEMS:  CONSTITUTIONAL: No fever, No chills, No fatigue, No myalgia, No Body ache, No Weakness  EYES: No eye pain,  No visual disturbances, No discharge, NO Redness  ENMT:  No ear pain, No nose bleed, No vertigo; No sinus pain, + throat pain, No Congestion, +dry throat  NECK: No pain, No stiffness  RESPIRATORY: No cough, NO wheezing, No  hemoptysis, NO  shortness of breath  CARDIOVASCULAR: + chest pain, palpitations  GASTROINTESTINAL: + abdominal pain radiating to back and shoulders, + epigastric pain. + nausea, No vomiting; + diarrhea, No constipation. [  ] BM  GENITOURINARY: No dysuria, No frequency, No urgency, No hematuria, NO incontinence  NEUROLOGICAL: No headaches, No dizziness, No numbness, No tingling, No tremors, No weakness  EXT: + L lower extremity Swelling, No Pain, No Edema  SKIN:  [ X ] + itching and rash L LE,  no burning or lesions   MUSCULOSKELETAL: No joint pain ,No Jt swelling; No muscle pain, No back pain, No extremity pain  PSYCHIATRIC: No depression,  No anxiety,  No mood swings ,No difficulty sleeping at night  PAIN SCALE: [  ] None  [ X ] Other-Moderate pain, 8/10  REST OF REVIEW Of SYSTEM - [ X ] Normal     Vital Signs Last 24 Hrs  T(C): 37.2 (24 Aug 2023 05:27), Max: 37.2 (24 Aug 2023 05:27)  T(F): 99 (24 Aug 2023 05:27), Max: 99 (24 Aug 2023 05:27)  HR: 93 (24 Aug 2023 05:27) (83 - 93)  BP: 135/85 (24 Aug 2023 05:27) (114/68 - 142/67)  BP(mean): --  RR: 18 (24 Aug 2023 05:27) (18 - 18)  SpO2: 93% (24 Aug 2023 05:27) (93% - 93%)    Parameters below as of 24 Aug 2023 05:27  Patient On (Oxygen Delivery Method): room air      Finger Stick        08-23 @ 07:01  -  08-24 @ 07:00  --------------------------------------------------------  IN: 1000 mL / OUT: 200 mL / NET: 800 mL        PHYSICAL EXAM:  GENERAL:  [ X ] NAD , [ X ] well appearing, [  ] Agitated, [  ] Drowsy,  [  ] Lethargy, [  ] confused   HEAD:  [ X ] Normal, [  ] Other  EYES:  [  ] EOMI, [  ] PERRLA, [ X ] conjunctiva and sclera clear normal, [  ] Other,  [  ] Pallor,[  ] Discharge  ENMT:  [ X ] Normal, [  ] Moist mucous membranes, [  ] Good dentition, [  ] No Thrush  NECK:  [ X ] Supple, [ X ] No JVD, [  ] Normal thyroid, [  ] Lymphadenopathy [  ] Other  CHEST/LUNG:  [ X ] Clear to auscultation bilaterally, [ X ] Breath Sounds equal B/L, [  ] poor effort  [ X ] No rales, [ X ] No rhonchi  [ X ]  No wheezing,   HEART:  [ X ] Regular rate and rhythm, [  ] tachycardia, [  ] Bradycardia,  [  ] irregular  [ X ] No murmurs, No rubs, No gallops, [  ] PPM in place (Mfr:  )  ABDOMEN:  [ X ] Soft, [  ] Nontender, [  ] Nondistended, [ X ] No mass, [ X ] Bowel sounds present, [  ] obese  NERVOUS SYSTEM:  [ X ] Alert & Oriented X3, [  ] Nonfocal  [  ] Confusion  [  ] Encephalopathic [  ] Sedated [  ] Unable to assess, [  ] Dementia [  ] Other-  EXTREMITIES: [ X ] 2+ Peripheral Pulses, No clubbing, No cyanosis,  [  ] edema B/L lower EXT. [ X ] PVD stasis skin changes B/L Lower EXT, [  ] wound  LYMPH: No lymphadenopathy noted  SKIN:  [  ] No rashes or lesions, [  ] Pressure Ulcers, [  ] ecchymosis, [  ] Skin Tears, [ X ] + Left Lower Ext shin Anterior skin erythema + warm , No Open wound    DIET: Diet, Clear Liquid (08-23-23 @ 15:18)      LABS:                        9.9    11.55 )-----------( 223      ( 24 Aug 2023 05:50 )             31.0     24 Aug 2023 05:50    136    |  107    |  7      ----------------------------<  72     3.9     |  21     |  0.49     Ca    8.5        24 Aug 2023 05:50    TPro  6.5    /  Alb  2.6    /  TBili  0.5    /  DBili  x      /  AST  39     /  ALT  40     /  AlkPhos  120    24 Aug 2023 05:50      Urinalysis Basic - ( 24 Aug 2023 05:50 )    Color: x / Appearance: x / SG: x / pH: x  Gluc: 72 mg/dL / Ketone: x  / Bili: x / Urobili: x   Blood: x / Protein: x / Nitrite: x   Leuk Esterase: x / RBC: x / WBC x   Sq Epi: x / Non Sq Epi: x / Bacteria: x        Culture Results:   No growth at 24 hours (08-22 @ 22:22)  Culture Results:   No growth at 24 hours (08-22 @ 21:50)      culture blood  -- .Blood Blood-Peripheral 08-22 @ 22:22    culture urine  --  08-22 @ 22:22  culture blood  -- .Blood Blood-Peripheral 08-22 @ 21:50    culture urine  --  08-22 @ 21:50              Culture - Blood (collected 22 Aug 2023 22:22)  Source: .Blood Blood-Peripheral  Preliminary Report (24 Aug 2023 02:02):    No growth at 24 hours    Culture - Blood (collected 22 Aug 2023 21:50)  Source: .Blood Blood-Peripheral  Preliminary Report (24 Aug 2023 02:02):    No growth at 24 hours         Anemia Panel:      Thyroid Panel:        Lipase: 271 U/L (08-23-23 @ 08:06)  Amylase: 128 U/L (08-23-23 @ 08:06)  Lipase: 2483 U/L (08-22-23 @ 09:58)          RADIOLOGY & ADDITIONAL TESTS:  < from: MR MRCP w/wo IV Cont (08.23.23 @ 13:17) >  IMPRESSION:    Motion limited evaluation.    Acute cholecystitis. Mild pancreatitis. Cystic focus of the pancreatic   body measuring up to 8 mm, image 23 series 3 may represent IPMN or   sequelae of pancreatitis.    No biliary distention. Mid/distal choledocholithiasis of the CBD, images   25-29 series 4 and image 17 series 2. Stones are not seen on all   sequences, likely due to small size and artifact.    Trace pleural effusions. Probable dependent atelectasis of the lung   parenchyma, suboptimally assessed on MRI. Moderate hiatal hernia.    < end of copied text >      < from: US Duplex Venous Lower Ext Complete, Bilateral (08.23.23 @ 10:35) >  IMPRESSION:  No evidence of deep venous thrombosis in either lower extremity.    < end of copied text >        HEALTH ISSUES - PROBLEM Dx:  Abdominal pain    Anxiety and depression    Left leg cellulitis    Need for prophylactic measure    Pancreatitis    Cholecystitis    Abnormal finding on CT scan            Consultant(s) Notes Reviewed:  [ X ] YES     Care Discussed with [X] Consultants  [ X ] Patient  [  ] Family [  ] HCP [  ]   [  ] Social Service  [  ] RN, [  ] Physical Therapy,[  ] Palliative care team  DVT PPX: [ X ] Lovenox, [  ] S C Heparin, [  ] Coumadin, [  ] Xarelto, [  ] Eliquis, [  ] Pradaxa, [  ] IV Heparin drip, [  ] SCD [  ] Contraindication 2 to GI Bleed,[  ] Ambulation [  ] Contraindicated 2 to  bleed [  ] Contraindicated 2 to Brain Bleed  Advanced directive: [ X ] None, [  ] DNR/DNI Patient is a 83y old  Female who presents with a chief complaint of cholecystitis, acute interstitial edematous pancreatitis.  Intrathoracic gastric fundal and body herniation (23 Aug 2023 14:01)    HPI:  84 y/o F w/ PMHx of HLD, osteoarthritis, GERD, depression/anxiety, varicose veins, B/L TKR  presents w/ sudden onset 10/10 upper abdominal pain since Sunday evening, pain radiation to bilateral lower back & bilateral shoulders. States  the pain was accompanied w/ nausea and several episodes of emesis that same night. She has had not appetite since then and has not eaten since Sunday night. Admits to chills, weakness, fatigue, nausea, abd bloating and several episodes of diarrhea since arrival to the ED. Denies headaches, chest pain, palpitations, shortness of breath, acid reflux, vomiting or urinary sx. Reports no past hx of pancreatitis. No hx of cancer.   Last colonoscopy was around 5 years ago, family hx of Crohn's - sister and granddaughter. Denies any recent alcohol use, drug abuse.     IN ED,  VITALS /69 HR 94 RR 20 Temp 98F on RA   PERTINENT LABS: Wbc 12k 82% predominant neutrophils, hb 11.3 RDW index 17.5   K 3.4 Glucose 106 AST 44 ALK po4 146  Lipase 2483  CT A/P : Mild, acute interstitial edematous pancreatitis in the pancreatic tail.  No discrete fluid collections. Edematous gallbladder wall and increased enhancement. The finding may be   secondary the presence of inflammation in the pancreas and  versus acute  cholecystitis. Recommend right upper quadrant abdominal ultrasound for  further evaluation and correlation.  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    Focus of air in the bladder. Correlate with instrumentation versus   infection. Correlate with urinalysis.    CXR : Intrathoracic gastric fundal and body herniation. Please see lower chest images of abdominal CT scan 8/22/2023. No airspace consolidation.    RUQ U/S Echogenicity in the region of the gallbladder neck suggests small stones versus gravel. Gallbladder wall thickening identified measuring 7-8 mm. Trace pericholecystic fluid. Correlate for cholecystitis.  s/ p  1X iv Tylenol zosyn, potassium 40mg X1,  2L NS in ER   EKG shows NSR 87 bpm QTc 462  (22 Aug 2023 20:04)    INTERVAL HPI:  8/23: Patient was seen and examined at bedside. They state they have been experiencing many loose BMs (>8 in the last 24 hours), loose BMs started prior to admission. Patient still has constant epigastric pain now at a 6/10. Patient denies HA, F/C, CP,SOB,Abd pain, dysuria, leg swelling, or leg pain. Going for MRCP today.  8/24: Patient was seen and examined at bedside. They state they have been having epigastric pain, radiating to the shoulder and back consistently still. Rated the pain as 8/10 at the worst, 6/10 when sleeping. She is continuing to have loose bowel movements, and states she had 4 loose bowel movements overnight with tenesmus. Patient states she has itching of her L lower extremity and trouble swallowing with a dry throat. Denies CP, SOB, dysuria, numbness and tingling of the lower extremities. Will be going for ERCP tomorrow. Cholecystectomy   Low H/H , On IV Abx     OVERNIGHT EVENTS: NONeE    Home Medications:  celecoxib 200 mg oral capsule: 1 cap(s) orally every 12 hours (22 Aug 2023 19:52)  Crestor 40 mg oral tablet: 1 tab(s) orally once a day (at bedtime) (22 Aug 2023 19:52)  Ecotrin Adult Low Strength 81 mg oral delayed release tablet: 1 tab(s) orally every 12 hours (22 Aug 2023 19:52)  Lexapro 20 mg oral tablet: 1 tab(s) orally once a day (22 Aug 2023 19:52)  pantoprazole 40 mg oral delayed release tablet: 1 tab(s) orally once a day (before a meal) (22 Aug 2023 19:52)  Trileptal 300 mg oral tablet: 1 tab(s) orally 2 times a day (22 Aug 2023 19:52)  Vitamin D3 1000 intl units (25 mcg) oral tablet: 1 tab(s) orally once a day (22 Aug 2023 19:52)      MEDICATIONS  (STANDING):  aspirin enteric coated 81 milliGRAM(s) Oral daily  atorvastatin 80 milliGRAM(s) Oral at bedtime  cholecalciferol 1000 Unit(s) Oral daily  enoxaparin Injectable 40 milliGRAM(s) SubCutaneous every 24 hours  escitalopram 20 milliGRAM(s) Oral daily  lactobacillus acidophilus 1 Tablet(s) Oral daily  meropenem  IVPB 1000 milliGRAM(s) IV Intermittent every 8 hours  OXcarbazepine 300 milliGRAM(s) Oral two times a day  pantoprazole    Tablet 40 milliGRAM(s) Oral before breakfast  sodium chloride 0.9%. 1000 milliLiter(s) (75 mL/Hr) IV Continuous <Continuous>    MEDICATIONS  (PRN):  acetaminophen     Tablet .. 650 milliGRAM(s) Oral every 6 hours PRN Temp greater or equal to 38C (100.4F), Mild Pain (1 - 3)  melatonin 3 milliGRAM(s) Oral at bedtime PRN Insomnia  morphine  - Injectable 0.5 milliGRAM(s) IV Push every 4 hours PRN Mild Pain (1 - 3)  morphine  - Injectable 2 milliGRAM(s) IV Push every 4 hours PRN Severe Pain (7 - 10)  morphine  - Injectable 1 milliGRAM(s) IV Push every 4 hours PRN Moderate Pain (4 - 6)  ondansetron Injectable 4 milliGRAM(s) IV Push every 8 hours PRN Nausea and/or Vomiting      Allergies    adhesives (Pruritus; Rash)  No Known Drug Allergies    Intolerances    lactose (Diarrhea)      Social History:  Tobacco:  denies  EtOH: denies   Recreational drug use: denies   Lives with: family   Ambulates: with walker (22 Aug 2023 20:04)      REVIEW OF SYSTEMS:  CONSTITUTIONAL: No fever, No chills, No fatigue, No myalgia, No Body ache, No Weakness  EYES: No eye pain,  No visual disturbances, No discharge, NO Redness  ENMT:  No ear pain, No nose bleed, No vertigo; No sinus pain, + throat pain, No Congestion, +dry throat  NECK: No pain, No stiffness  RESPIRATORY: No cough, NO wheezing, No  hemoptysis, NO  shortness of breath  CARDIOVASCULAR: + chest pain, palpitations  GASTROINTESTINAL: + abdominal pain radiating to back and shoulders, + epigastric pain. + nausea, No vomiting; + diarrhea, No constipation. [  ] BM  GENITOURINARY: No dysuria, No frequency, No urgency, No hematuria, NO incontinence  NEUROLOGICAL: No headaches, No dizziness, No numbness, No tingling, No tremors, No weakness  EXT: + L lower extremity Swelling, No Pain, No Edema  SKIN:  [ X ] + itching and rash L LE,  no burning or lesions   MUSCULOSKELETAL: No joint pain ,No Jt swelling; No muscle pain, No back pain, No extremity pain  PSYCHIATRIC: No depression,  No anxiety,  No mood swings ,No difficulty sleeping at night  PAIN SCALE: [  ] None  [ X ] Other-Moderate pain, 8/10  REST OF REVIEW Of SYSTEM - [ X ] Normal     Vital Signs Last 24 Hrs  T(C): 37.2 (24 Aug 2023 05:27), Max: 37.2 (24 Aug 2023 05:27)  T(F): 99 (24 Aug 2023 05:27), Max: 99 (24 Aug 2023 05:27)  HR: 93 (24 Aug 2023 05:27) (83 - 93)  BP: 135/85 (24 Aug 2023 05:27) (114/68 - 142/67)  BP(mean): --  RR: 18 (24 Aug 2023 05:27) (18 - 18)  SpO2: 93% (24 Aug 2023 05:27) (93% - 93%)    Parameters below as of 24 Aug 2023 05:27  Patient On (Oxygen Delivery Method): room air      Finger Stick        08-23 @ 07:01  -  08-24 @ 07:00  --------------------------------------------------------  IN: 1000 mL / OUT: 200 mL / NET: 800 mL        PHYSICAL EXAM:  GENERAL:  [ X ] NAD , [ X ] well appearing, [  ] Agitated, [  ] Drowsy,  [  ] Lethargy, [  ] confused   HEAD:  [ X ] Normal, [  ] Other  EYES:  [  ] EOMI, [  ] PERRLA, [ X ] conjunctiva and sclera clear normal, [  ] Other,  [  ] Pallor,[  ] Discharge  ENMT:  [ X ] Normal, [  x] Moist mucous membranes, [  ] Good dentition, [  ] No Thrush  NECK:  [ X ] Supple, [ X ] No JVD, [ x ] Normal thyroid, [  ] Lymphadenopathy [  ] Other  CHEST/LUNG:  [ X ] Clear to auscultation bilaterally, [ X ] Breath Sounds equal B/L, [  ] poor effort  [ X ] No rales, [ X ] No rhonchi  [ X ]  No wheezing,   HEART:  [ X ] Regular rate and rhythm, [  ] tachycardia, [  ] Bradycardia,  [  ] irregular  [ X ] No murmurs, No rubs, No gallops, [  ] PPM in place (Mfr:  )  ABDOMEN:  [ X ] Soft, [  ] Nontender, [  ] Nondistended, [ X ] No mass, [ X ] Bowel sounds present, [ x ] obese  NERVOUS SYSTEM:  [ X ] Alert & Oriented X3, [  ] Nonfocal  [  ] Confusion  [  ] Encephalopathic [  ] Sedated [  ] Unable to assess, [  ] Dementia [  ] Other-  EXTREMITIES: [ X ] 2+ Peripheral Pulses, No clubbing, No cyanosis,  [  ] edema B/L lower EXT. [ X ] PVD stasis skin changes B/L Lower EXT, [  ] wound  LYMPH: No lymphadenopathy noted  SKIN:  [x  ] No rashes or lesions, [  ] Pressure Ulcers, [  ] ecchymosis, [  ] Skin Tears, [ X ] + Left Lower Ext shin Anterior skin erythema + warm , On Calf small are of redness & induration  No Open wound    DIET: Diet, Clear Liquid (08-23-23 @ 15:18)      LABS:                        9.9    11.55 )-----------( 223      ( 24 Aug 2023 05:50 )             31.0     24 Aug 2023 05:50    136    |  107    |  7      ----------------------------<  72     3.9     |  21     |  0.49     Ca    8.5        24 Aug 2023 05:50    TPro  6.5    /  Alb  2.6    /  TBili  0.5    /  DBili  x      /  AST  39     /  ALT  40     /  AlkPhos  120    24 Aug 2023 05:50      Urinalysis Basic - ( 24 Aug 2023 05:50 )    Color: x / Appearance: x / SG: x / pH: x  Gluc: 72 mg/dL / Ketone: x  / Bili: x / Urobili: x   Blood: x / Protein: x / Nitrite: x   Leuk Esterase: x / RBC: x / WBC x   Sq Epi: x / Non Sq Epi: x / Bacteria: x        Culture Results:   No growth at 24 hours (08-22 @ 22:22)  Culture Results:   No growth at 24 hours (08-22 @ 21:50)      culture blood  -- .Blood Blood-Peripheral 08-22 @ 22:22    culture urine  --  08-22 @ 22:22  culture blood  -- .Blood Blood-Peripheral 08-22 @ 21:50    culture urine  --  08-22 @ 21:50              Culture - Blood (collected 22 Aug 2023 22:22)  Source: .Blood Blood-Peripheral  Preliminary Report (24 Aug 2023 02:02):    No growth at 24 hours    Culture - Blood (collected 22 Aug 2023 21:50)  Source: .Blood Blood-Peripheral  Preliminary Report (24 Aug 2023 02:02):    No growth at 24 hours         Anemia Panel:      Thyroid Panel:        Lipase: 271 U/L (08-23-23 @ 08:06)  Amylase: 128 U/L (08-23-23 @ 08:06)  Lipase: 2483 U/L (08-22-23 @ 09:58)          RADIOLOGY & ADDITIONAL TESTS:  < from: MR MRCP w/wo IV Cont (08.23.23 @ 13:17) >  IMPRESSION:    Motion limited evaluation.    Acute cholecystitis. Mild pancreatitis. Cystic focus of the pancreatic   body measuring up to 8 mm, image 23 series 3 may represent IPMN or   sequelae of pancreatitis.    No biliary distention. Mid/distal choledocholithiasis of the CBD, images   25-29 series 4 and image 17 series 2. Stones are not seen on all   sequences, likely due to small size and artifact.    Trace pleural effusions. Probable dependent atelectasis of the lung   parenchyma, suboptimally assessed on MRI. Moderate hiatal hernia.    < end of copied text >      < from: US Duplex Venous Lower Ext Complete, Bilateral (08.23.23 @ 10:35) >  IMPRESSION:  No evidence of deep venous thrombosis in either lower extremity.    < end of copied text >        HEALTH ISSUES - PROBLEM Dx:  Abdominal pain    Anxiety and depression    Left leg cellulitis    Need for prophylactic measure    Pancreatitis    Cholecystitis    Abnormal finding on CT scan            Consultant(s) Notes Reviewed:  [ X ] YES     Care Discussed with [X] Consultants  [ X ] Patient  [x  ] Family [  ] HCP [  ]   [  ] Social Service  [ x ] RN, [  ] Physical Therapy,[  ] Palliative care team  DVT PPX: [ X ] Lovenox, [  ] S C Heparin, [  ] Coumadin, [  ] Xarelto, [  ] Eliquis, [  ] Pradaxa, [  ] IV Heparin drip, [  ] SCD [  ] Contraindication 2 to GI Bleed,[  ] Ambulation [  ] Contraindicated 2 to  bleed [  ] Contraindicated 2 to Brain Bleed  Advanced directive: [ X ] None, [  ] DNR/DNI

## 2023-08-24 NOTE — PHYSICAL THERAPY INITIAL EVALUATION ADULT - ADDITIONAL COMMENTS
Pt lives w/ her spouse in a house, + steps. Pt is an independent ambulator without device- uses SC for outdoor ambulation and independent with ADLs.

## 2023-08-24 NOTE — CARE COORDINATION ASSESSMENT. - NSDCPLANSERVICES_GEN_ALL_CORE
Anticipated dc plan to home no services when medically cleared. Pt independent with ADLS prior to admission. CM to follow, SW to remain available./No Anticipated Discharge Needs

## 2023-08-24 NOTE — PHYSICAL THERAPY INITIAL EVALUATION ADULT - PERTINENT HX OF CURRENT PROBLEM, REHAB EVAL
84 y/o F adm 8/22 w/ PMHx of HLD, osteoarthritis, GERD, depression/anxiety, varicose veins ,Left  TKR  presents w/ sudden onset 10/10 upper abdominal pain  CT A/P : Mild, acute interstitial edematous pancreatitis in the pancreatic tail.. Admitted for Gall stone, edematous pancreatitis & Cholecystitis, Cholelithiasis and Intrathoracic gastric fundal and body herniation. 8/23 BLE dopplers: negative DVT.

## 2023-08-24 NOTE — PROGRESS NOTE ADULT - ASSESSMENT
Pancreatitis  Cholecystitis  ?choledocholithiasis    CT and US noted  LFTs noted  Follow MRCP  IVF  Supportive care  Surgery recs appreciated    I reviewed the overnight course of events on the unit, re-confirming the patient history. I discussed the care with the patient and their family  Differential diagnosis and plan of care discussed with patient after the evaluation  40 minutes spent on total encounter of which more than fifty percent of the encounter was spent counseling and/or coordinating care by the attending physician.  Advanced care planning was discussed with patient and family.  Advanced care planning forms were reviewed and discussed.  Risks, benefits and alternatives of gastroenterologic procedures were discussed in detail and all questions were answered.   Pancreatitis  Cholecystitis  ?choledocholithiasis    CT and US noted  LFTs noted  MRCP noted  planning for ERCP and cholecystectomy tomorrow  risks/benefits/alternatives including but not limited to bleeding, infection, perforation or injury requiring surgery all discussed  pt currently has pancreatitis; risk of worsening 2/2 ERCP d/w pt  chance of needing biliary or pancreatic stent also discussed which would necessitate future procedures also discussed  patient understands and agrees to proceed with procedure  discussed all of the above with pt, , and daughter    I reviewed the overnight course of events on the unit, re-confirming the patient history. I discussed the care with the patient and their family  Differential diagnosis and plan of care discussed with patient after the evaluation  40 minutes spent on total encounter of which more than fifty percent of the encounter was spent counseling and/or coordinating care by the attending physician.  Advanced care planning was discussed with patient and family.  Advanced care planning forms were reviewed and discussed.  Risks, benefits and alternatives of gastroenterologic procedures were discussed in detail and all questions were answered.

## 2023-08-24 NOTE — PROGRESS NOTE ADULT - SUBJECTIVE AND OBJECTIVE BOX
pt seen  still some discomfort  ICU Vital Signs Last 24 Hrs  T(C): 37.2 (24 Aug 2023 05:27), Max: 37.2 (24 Aug 2023 05:27)  T(F): 99 (24 Aug 2023 05:27), Max: 99 (24 Aug 2023 05:27)  HR: 93 (24 Aug 2023 05:27) (83 - 93)  BP: 135/85 (24 Aug 2023 05:27) (114/68 - 142/67)  BP(mean): --  ABP: --  ABP(mean): --  RR: 18 (24 Aug 2023 05:27) (18 - 18)  SpO2: 93% (24 Aug 2023 05:27) (93% - 93%)    O2 Parameters below as of 24 Aug 2023 05:27  Patient On (Oxygen Delivery Method): room air        gen-NAD  resp-clear  abd-soft ND, epigastric tenderness                            9.9    11.55 )-----------( 223      ( 24 Aug 2023 05:50 )             31.0   08-24    136  |  107  |  7   ----------------------------<  72  3.9   |  21<L>  |  0.49<L>    Ca    8.5      24 Aug 2023 05:50    TPro  6.5  /  Alb  2.6<L>  /  TBili  0.5  /  DBili  x   /  AST  39<H>  /  ALT  40  /  AlkPhos  120  08-24      < from: MR MRCP w/wo IV Cont (08.23.23 @ 13:17) >    Motion limited evaluation.    Acute cholecystitis. Mild pancreatitis. Cystic focus of the pancreatic   body measuring up to 8 mm, image 23 series 3 may represent IPMN or   sequelae of pancreatitis.    No biliary distention. Mid/distal choledocholithiasis of the CBD, images   25-29 series 4 and image 17 series 2. Stones are not seen on all   sequences, likely due to small size and artifact.    Trace pleural effusions. Probable dependent atelectasis of the lung   parenchyma, suboptimally assessed on MRI. Moderate hiatal hernia.    < end of copied text >

## 2023-08-24 NOTE — CARE COORDINATION ASSESSMENT. - NSCAREPROVIDERS_GEN_ALL_CORE_FT
CARE PROVIDERS:  Accepting Physician: Adalberto Kwok  Administration: Isiah Bhardwaj  Administration: Gilson Love  Admitting: Adalberto Kwok  Attending: Adalberto Kwok  Case Management: Lillian Orr  Consultant: Alexandrea Trejo  Consultant: Ne Kwok  Consultant: Elena Quach  Consultant: Favian Payan  Consultant: Sy Tobar  Consultant: Tom Dickson  Consultant: Ham Escamilla  Consultant: Hadley Dawn  ED Attending: Paulo Delacruz  ED Attending2: Paulo Delacruz  ED Nurse: Calixto Blackwell  HIM/Billing & Coding: Dara Garcia  Nurse: Balbina Gardner  Ordered: ServiceAccount, SCMMLM  Ordered: ADM, User  Ordered: Doctor, Unknown  Outpatient Provider: Uriah Delacruz  PCA/Nursing Assistant: Tamara Howard  Physical Therapy: Ad Andre  Primary Team: Emerson Herrera  Primary Team: Hadley Dawn  Primary Team: Tani De Jesus  Primary Team: Denise Veloz  Primary Team: Yara Baptiste  Primary Team: Dorian Thomas  Registered Dietitian: Alexandrea Martinez  Respiratory Therapy: Carol Waddell  : Nedra Saunders  Student: Jovita Brown

## 2023-08-24 NOTE — CARE COORDINATION ASSESSMENT. - NSPASTMEDSURGHISTORY_GEN_ALL_CORE_FT
PAST MEDICAL & SURGICAL HISTORY:  Depression      Anxiety      History of vascular disease  venous; has swelling      GERD (gastroesophageal reflux disease)      Hyperlipidemia      S/P cataract surgery  bilateral      S/P tonsillectomy      S/P wrist surgery  right      S/P foot surgery, right  right great toe surgery      HLD (hyperlipidemia)      Osteoarthritis of left knee      S/P total knee replacement, left

## 2023-08-24 NOTE — PROGRESS NOTE ADULT - PROBLEM SELECTOR PLAN 2
Ac Cholecystitis with Cholelithiasis   -- RUQ U/S Echogenicity in the region of the gallbladder neck suggests small stones versus gravel. Gallbladder wall thickening identified measuring 7-8 mm. Trace pericholecystic fluid. Correlate for cholecystitis.  - Plan for Lap virgil after stabilization  -IV Zosyn q 8 switched to  IV meropenem 1 gm q 8 hrs  as patient has had >8 BMs in 24 hours   -  MRCP + AC Cholecystitis   Surgery (Dr xavier)  d/w -OR on Friday   GI(Dr Mandujano D/W -ERCP for CBD stone on Friday   Pain meds  -, IVF, D/W Sx -Ok to start Clear liquid Ac Cholecystitis with Cholelithiasis   -- RUQ U/S Echogenicity in the region of the gallbladder neck suggests small stones versus gravel. Gallbladder wall thickening identified measuring 7-8 mm. Trace pericholecystic fluid. Correlate for cholecystitis.  - Plan for Lap virgil after stabilization  -IV Zosyn q 8 switched to  IV meropenem 1 gm q 8 hrs  as patient has had >8 BMs in 24 hours   -  MRCP + AC Cholecystitis   Surgery (Dr xavier)  d/w -OR on Friday   GI(Dr Mandujano D/W -ERCP for CBD stone on Friday   Pain meds  Will be NPO after midnight for ERCP 8/25 Ac Cholecystitis with Cholelithiasis   -- RUQ U/S Echogenicity in the region of the gallbladder neck suggests small stones versus gravel. Gallbladder wall thickening identified measuring 7-8 mm. Trace pericholecystic fluid. Correlate for cholecystitis.  - Plan for Lap virgil after stabilization  -IV Zosyn q 8 switched to  IV meropenem 1 gm q 8 hrs  as patient has had >8 BMs in 24 hours   -  MRCP + AC Cholecystitis   Surgery (Dr xavier)  d/w -OR on Friday   GI(Dr Mandujano D/W -ERCP for CBD stone on Friday   Pain meds  Will be NPO after midnight for ERCP and robotic virgil 8/25

## 2023-08-24 NOTE — CARE COORDINATION ASSESSMENT. - REASON FOR CONSULT
SW met with pt and her daughter at bedside in order to conduct assessment and to discuss SW roles with good understanding./coordination of care/discharge planning

## 2023-08-24 NOTE — PROGRESS NOTE ADULT - ASSESSMENT
84 yo with gallstone pancreatitis, cbd stone, poss cholecystitis  cotn current care  OR tomorrow for robotic virgil and ERCP

## 2023-08-25 ENCOUNTER — RESULT REVIEW (OUTPATIENT)
Age: 83
End: 2023-08-25

## 2023-08-25 DIAGNOSIS — R19.7 DIARRHEA, UNSPECIFIED: ICD-10-CM

## 2023-08-25 LAB
-  AMIKACIN: SIGNIFICANT CHANGE UP
-  AMOXICILLIN/CLAVULANIC ACID: SIGNIFICANT CHANGE UP
-  AMPICILLIN/SULBACTAM: SIGNIFICANT CHANGE UP
-  AMPICILLIN: SIGNIFICANT CHANGE UP
-  AZTREONAM: SIGNIFICANT CHANGE UP
-  CEFAZOLIN: SIGNIFICANT CHANGE UP
-  CEFEPIME: SIGNIFICANT CHANGE UP
-  CEFOXITIN: SIGNIFICANT CHANGE UP
-  CEFTRIAXONE: SIGNIFICANT CHANGE UP
-  CEFUROXIME: SIGNIFICANT CHANGE UP
-  CIPROFLOXACIN: SIGNIFICANT CHANGE UP
-  ERTAPENEM: SIGNIFICANT CHANGE UP
-  GENTAMICIN: SIGNIFICANT CHANGE UP
-  IMIPENEM: SIGNIFICANT CHANGE UP
-  LEVOFLOXACIN: SIGNIFICANT CHANGE UP
-  MEROPENEM: SIGNIFICANT CHANGE UP
-  NITROFURANTOIN: SIGNIFICANT CHANGE UP
-  PIPERACILLIN/TAZOBACTAM: SIGNIFICANT CHANGE UP
-  TOBRAMYCIN: SIGNIFICANT CHANGE UP
-  TRIMETHOPRIM/SULFAMETHOXAZOLE: SIGNIFICANT CHANGE UP
ALBUMIN SERPL ELPH-MCNC: 2.5 G/DL — LOW (ref 3.3–5)
ALP SERPL-CCNC: 129 U/L — HIGH (ref 40–120)
ALT FLD-CCNC: 31 U/L — SIGNIFICANT CHANGE UP (ref 12–78)
ANION GAP SERPL CALC-SCNC: 10 MMOL/L — SIGNIFICANT CHANGE UP (ref 5–17)
ANION GAP SERPL CALC-SCNC: 8 MMOL/L — SIGNIFICANT CHANGE UP (ref 5–17)
APTT BLD: 30.8 SEC — SIGNIFICANT CHANGE UP (ref 24.5–35.6)
AST SERPL-CCNC: 33 U/L — SIGNIFICANT CHANGE UP (ref 15–37)
BASOPHILS # BLD AUTO: 0.03 K/UL — SIGNIFICANT CHANGE UP (ref 0–0.2)
BASOPHILS NFR BLD AUTO: 0.3 % — SIGNIFICANT CHANGE UP (ref 0–2)
BILIRUB SERPL-MCNC: 0.6 MG/DL — SIGNIFICANT CHANGE UP (ref 0.2–1.2)
BUN SERPL-MCNC: 6 MG/DL — LOW (ref 7–23)
BUN SERPL-MCNC: 7 MG/DL — SIGNIFICANT CHANGE UP (ref 7–23)
CALCIUM SERPL-MCNC: 8.6 MG/DL — SIGNIFICANT CHANGE UP (ref 8.5–10.1)
CALCIUM SERPL-MCNC: 8.8 MG/DL — SIGNIFICANT CHANGE UP (ref 8.5–10.1)
CHLORIDE SERPL-SCNC: 104 MMOL/L — SIGNIFICANT CHANGE UP (ref 96–108)
CHLORIDE SERPL-SCNC: 105 MMOL/L — SIGNIFICANT CHANGE UP (ref 96–108)
CHOLEST SERPL-MCNC: 163 MG/DL — SIGNIFICANT CHANGE UP
CO2 SERPL-SCNC: 23 MMOL/L — SIGNIFICANT CHANGE UP (ref 22–31)
CO2 SERPL-SCNC: 26 MMOL/L — SIGNIFICANT CHANGE UP (ref 22–31)
CREAT SERPL-MCNC: 0.41 MG/DL — LOW (ref 0.5–1.3)
CREAT SERPL-MCNC: 0.43 MG/DL — LOW (ref 0.5–1.3)
CULTURE RESULTS: SIGNIFICANT CHANGE UP
EGFR: 96 ML/MIN/1.73M2 — SIGNIFICANT CHANGE UP
EGFR: 98 ML/MIN/1.73M2 — SIGNIFICANT CHANGE UP
EOSINOPHIL # BLD AUTO: 0.15 K/UL — SIGNIFICANT CHANGE UP (ref 0–0.5)
EOSINOPHIL NFR BLD AUTO: 1.4 % — SIGNIFICANT CHANGE UP (ref 0–6)
GLUCOSE SERPL-MCNC: 88 MG/DL — SIGNIFICANT CHANGE UP (ref 70–99)
GLUCOSE SERPL-MCNC: 94 MG/DL — SIGNIFICANT CHANGE UP (ref 70–99)
HCT VFR BLD CALC: 30.7 % — LOW (ref 34.5–45)
HDLC SERPL-MCNC: 57 MG/DL — SIGNIFICANT CHANGE UP
HGB BLD-MCNC: 9.9 G/DL — LOW (ref 11.5–15.5)
IMM GRANULOCYTES NFR BLD AUTO: 0.4 % — SIGNIFICANT CHANGE UP (ref 0–0.9)
INR BLD: 1.04 RATIO — SIGNIFICANT CHANGE UP (ref 0.85–1.18)
LIPID PNL WITH DIRECT LDL SERPL: 85 MG/DL — SIGNIFICANT CHANGE UP
LYMPHOCYTES # BLD AUTO: 1.12 K/UL — SIGNIFICANT CHANGE UP (ref 1–3.3)
LYMPHOCYTES # BLD AUTO: 10.5 % — LOW (ref 13–44)
MAGNESIUM SERPL-MCNC: 1.9 MG/DL — SIGNIFICANT CHANGE UP (ref 1.6–2.6)
MCHC RBC-ENTMCNC: 25.8 PG — LOW (ref 27–34)
MCHC RBC-ENTMCNC: 32.2 GM/DL — SIGNIFICANT CHANGE UP (ref 32–36)
MCV RBC AUTO: 80.2 FL — SIGNIFICANT CHANGE UP (ref 80–100)
METHOD TYPE: SIGNIFICANT CHANGE UP
MONOCYTES # BLD AUTO: 0.99 K/UL — HIGH (ref 0–0.9)
MONOCYTES NFR BLD AUTO: 9.3 % — SIGNIFICANT CHANGE UP (ref 2–14)
NEUTROPHILS # BLD AUTO: 8.31 K/UL — HIGH (ref 1.8–7.4)
NEUTROPHILS NFR BLD AUTO: 78.1 % — HIGH (ref 43–77)
NON HDL CHOLESTEROL: 106 MG/DL — SIGNIFICANT CHANGE UP
NRBC # BLD: 0 /100 WBCS — SIGNIFICANT CHANGE UP (ref 0–0)
ORGANISM # SPEC MICROSCOPIC CNT: SIGNIFICANT CHANGE UP
ORGANISM # SPEC MICROSCOPIC CNT: SIGNIFICANT CHANGE UP
PHOSPHATE SERPL-MCNC: 2.3 MG/DL — LOW (ref 2.5–4.5)
PLATELET # BLD AUTO: 263 K/UL — SIGNIFICANT CHANGE UP (ref 150–400)
POTASSIUM SERPL-MCNC: 3 MMOL/L — LOW (ref 3.5–5.3)
POTASSIUM SERPL-MCNC: 3.9 MMOL/L — SIGNIFICANT CHANGE UP (ref 3.5–5.3)
POTASSIUM SERPL-SCNC: 3 MMOL/L — LOW (ref 3.5–5.3)
POTASSIUM SERPL-SCNC: 3.9 MMOL/L — SIGNIFICANT CHANGE UP (ref 3.5–5.3)
PROT SERPL-MCNC: 6.7 G/DL — SIGNIFICANT CHANGE UP (ref 6–8.3)
PROTHROM AB SERPL-ACNC: 12.1 SEC — SIGNIFICANT CHANGE UP (ref 9.5–13)
RBC # BLD: 3.83 M/UL — SIGNIFICANT CHANGE UP (ref 3.8–5.2)
RBC # FLD: 18 % — HIGH (ref 10.3–14.5)
SODIUM SERPL-SCNC: 138 MMOL/L — SIGNIFICANT CHANGE UP (ref 135–145)
SODIUM SERPL-SCNC: 138 MMOL/L — SIGNIFICANT CHANGE UP (ref 135–145)
SPECIMEN SOURCE: SIGNIFICANT CHANGE UP
TRIGL SERPL-MCNC: 116 MG/DL — SIGNIFICANT CHANGE UP
VANCOMYCIN TROUGH SERPL-MCNC: 5.2 UG/ML — LOW (ref 10–20)
WBC # BLD: 10.64 K/UL — HIGH (ref 3.8–10.5)
WBC # FLD AUTO: 10.64 K/UL — HIGH (ref 3.8–10.5)

## 2023-08-25 PROCEDURE — 88304 TISSUE EXAM BY PATHOLOGIST: CPT | Mod: 26

## 2023-08-25 DEVICE — LIGATING CLIPS WECK HEMOLOK POLYMER MEDIUM-LARGE (GREEN) 6: Type: IMPLANTABLE DEVICE | Status: FUNCTIONAL

## 2023-08-25 DEVICE — HYDRATOME 44: Type: IMPLANTABLE DEVICE | Status: FUNCTIONAL

## 2023-08-25 DEVICE — CATH BLLN BIL RX 9-12CM: Type: IMPLANTABLE DEVICE | Status: FUNCTIONAL

## 2023-08-25 DEVICE — AUTOTOME CANNULATING SPHINCTEROTOME RX 44 20MM: Type: IMPLANTABLE DEVICE | Status: FUNCTIONAL

## 2023-08-25 RX ORDER — PANTOPRAZOLE SODIUM 20 MG/1
40 TABLET, DELAYED RELEASE ORAL
Refills: 0 | Status: DISCONTINUED | OUTPATIENT
Start: 2023-08-25 | End: 2023-08-27

## 2023-08-25 RX ORDER — POTASSIUM CHLORIDE 20 MEQ
40 PACKET (EA) ORAL EVERY 4 HOURS
Refills: 0 | Status: COMPLETED | OUTPATIENT
Start: 2023-08-25 | End: 2023-08-25

## 2023-08-25 RX ORDER — PIPERACILLIN AND TAZOBACTAM 4; .5 G/20ML; G/20ML
3.38 INJECTION, POWDER, LYOPHILIZED, FOR SOLUTION INTRAVENOUS ONCE
Refills: 0 | Status: DISCONTINUED | OUTPATIENT
Start: 2023-08-25 | End: 2023-08-25

## 2023-08-25 RX ORDER — LACTOBACILLUS ACIDOPHILUS 100MM CELL
1 CAPSULE ORAL DAILY
Refills: 0 | Status: DISCONTINUED | OUTPATIENT
Start: 2023-08-25 | End: 2023-08-27

## 2023-08-25 RX ORDER — SOD,AMMONIUM,POTASSIUM LACTATE
1 CREAM (GRAM) TOPICAL
Refills: 0 | Status: DISCONTINUED | OUTPATIENT
Start: 2023-08-25 | End: 2023-08-27

## 2023-08-25 RX ORDER — LANOLIN ALCOHOL/MO/W.PET/CERES
3 CREAM (GRAM) TOPICAL AT BEDTIME
Refills: 0 | Status: DISCONTINUED | OUTPATIENT
Start: 2023-08-25 | End: 2023-08-27

## 2023-08-25 RX ORDER — CHOLECALCIFEROL (VITAMIN D3) 125 MCG
1000 CAPSULE ORAL DAILY
Refills: 0 | Status: DISCONTINUED | OUTPATIENT
Start: 2023-08-25 | End: 2023-08-27

## 2023-08-25 RX ORDER — INDOCYANINE GREEN 25 MG
5 KIT INTRAVASCULAR; INTRAVENOUS ONCE
Refills: 0 | Status: COMPLETED | OUTPATIENT
Start: 2023-08-25 | End: 2023-08-25

## 2023-08-25 RX ORDER — PIPERACILLIN AND TAZOBACTAM 4; .5 G/20ML; G/20ML
3.38 INJECTION, POWDER, LYOPHILIZED, FOR SOLUTION INTRAVENOUS ONCE
Refills: 0 | Status: COMPLETED | OUTPATIENT
Start: 2023-08-25 | End: 2023-08-25

## 2023-08-25 RX ORDER — PIPERACILLIN AND TAZOBACTAM 4; .5 G/20ML; G/20ML
3.38 INJECTION, POWDER, LYOPHILIZED, FOR SOLUTION INTRAVENOUS EVERY 8 HOURS
Refills: 0 | Status: DISCONTINUED | OUTPATIENT
Start: 2023-08-25 | End: 2023-08-27

## 2023-08-25 RX ORDER — ACETAMINOPHEN 500 MG
650 TABLET ORAL EVERY 6 HOURS
Refills: 0 | Status: DISCONTINUED | OUTPATIENT
Start: 2023-08-25 | End: 2023-08-27

## 2023-08-25 RX ORDER — MORPHINE SULFATE 50 MG/1
2 CAPSULE, EXTENDED RELEASE ORAL EVERY 4 HOURS
Refills: 0 | Status: DISCONTINUED | OUTPATIENT
Start: 2023-08-25 | End: 2023-08-27

## 2023-08-25 RX ORDER — MORPHINE SULFATE 50 MG/1
1 CAPSULE, EXTENDED RELEASE ORAL EVERY 4 HOURS
Refills: 0 | Status: DISCONTINUED | OUTPATIENT
Start: 2023-08-25 | End: 2023-08-27

## 2023-08-25 RX ORDER — ATORVASTATIN CALCIUM 80 MG/1
80 TABLET, FILM COATED ORAL AT BEDTIME
Refills: 0 | Status: DISCONTINUED | OUTPATIENT
Start: 2023-08-25 | End: 2023-08-27

## 2023-08-25 RX ORDER — SODIUM,POTASSIUM PHOSPHATES 278-250MG
1 POWDER IN PACKET (EA) ORAL ONCE
Refills: 0 | Status: COMPLETED | OUTPATIENT
Start: 2023-08-25 | End: 2023-08-25

## 2023-08-25 RX ORDER — POTASSIUM CHLORIDE 20 MEQ
10 PACKET (EA) ORAL
Refills: 0 | Status: COMPLETED | OUTPATIENT
Start: 2023-08-25 | End: 2023-08-25

## 2023-08-25 RX ORDER — PIPERACILLIN AND TAZOBACTAM 4; .5 G/20ML; G/20ML
3.38 INJECTION, POWDER, LYOPHILIZED, FOR SOLUTION INTRAVENOUS ONCE
Refills: 0 | Status: CANCELLED | OUTPATIENT
Start: 2023-08-26 | End: 2023-08-25

## 2023-08-25 RX ORDER — PIPERACILLIN AND TAZOBACTAM 4; .5 G/20ML; G/20ML
3.38 INJECTION, POWDER, LYOPHILIZED, FOR SOLUTION INTRAVENOUS EVERY 8 HOURS
Refills: 0 | Status: DISCONTINUED | OUTPATIENT
Start: 2023-08-25 | End: 2023-08-25

## 2023-08-25 RX ORDER — MORPHINE SULFATE 50 MG/1
0.5 CAPSULE, EXTENDED RELEASE ORAL EVERY 4 HOURS
Refills: 0 | Status: DISCONTINUED | OUTPATIENT
Start: 2023-08-25 | End: 2023-08-27

## 2023-08-25 RX ORDER — OXCARBAZEPINE 300 MG/1
300 TABLET, FILM COATED ORAL
Refills: 0 | Status: DISCONTINUED | OUTPATIENT
Start: 2023-08-25 | End: 2023-08-27

## 2023-08-25 RX ORDER — ESCITALOPRAM OXALATE 10 MG/1
20 TABLET, FILM COATED ORAL DAILY
Refills: 0 | Status: DISCONTINUED | OUTPATIENT
Start: 2023-08-25 | End: 2023-08-27

## 2023-08-25 RX ORDER — AZITHROMYCIN 500 MG/1
1000 TABLET, FILM COATED ORAL ONCE
Refills: 0 | Status: COMPLETED | OUTPATIENT
Start: 2023-08-25 | End: 2023-08-25

## 2023-08-25 RX ORDER — PIPERACILLIN AND TAZOBACTAM 4; .5 G/20ML; G/20ML
3.38 INJECTION, POWDER, LYOPHILIZED, FOR SOLUTION INTRAVENOUS EVERY 8 HOURS
Refills: 0 | Status: CANCELLED | OUTPATIENT
Start: 2023-08-26 | End: 2023-08-25

## 2023-08-25 RX ORDER — ONDANSETRON 8 MG/1
4 TABLET, FILM COATED ORAL EVERY 8 HOURS
Refills: 0 | Status: DISCONTINUED | OUTPATIENT
Start: 2023-08-25 | End: 2023-08-27

## 2023-08-25 RX ORDER — VANCOMYCIN HCL 1 G
750 VIAL (EA) INTRAVENOUS EVERY 12 HOURS
Refills: 0 | Status: DISCONTINUED | OUTPATIENT
Start: 2023-08-25 | End: 2023-08-27

## 2023-08-25 RX ADMIN — INDOCYANINE GREEN 5 MILLIGRAM(S): KIT INTRAVASCULAR; INTRAVENOUS at 15:01

## 2023-08-25 RX ADMIN — Medication 100 MILLIEQUIVALENT(S): at 11:33

## 2023-08-25 RX ADMIN — Medication 1 APPLICATION(S): at 06:49

## 2023-08-25 RX ADMIN — Medication 40 MILLIEQUIVALENT(S): at 18:36

## 2023-08-25 RX ADMIN — PIPERACILLIN AND TAZOBACTAM 200 GRAM(S): 4; .5 INJECTION, POWDER, LYOPHILIZED, FOR SOLUTION INTRAVENOUS at 15:20

## 2023-08-25 RX ADMIN — MEROPENEM 100 MILLIGRAM(S): 1 INJECTION INTRAVENOUS at 06:49

## 2023-08-25 RX ADMIN — OXCARBAZEPINE 300 MILLIGRAM(S): 300 TABLET, FILM COATED ORAL at 18:34

## 2023-08-25 RX ADMIN — PANTOPRAZOLE SODIUM 40 MILLIGRAM(S): 20 TABLET, DELAYED RELEASE ORAL at 06:49

## 2023-08-25 RX ADMIN — PIPERACILLIN AND TAZOBACTAM 25 GRAM(S): 4; .5 INJECTION, POWDER, LYOPHILIZED, FOR SOLUTION INTRAVENOUS at 22:42

## 2023-08-25 RX ADMIN — ATORVASTATIN CALCIUM 80 MILLIGRAM(S): 80 TABLET, FILM COATED ORAL at 22:42

## 2023-08-25 RX ADMIN — AZITHROMYCIN 1000 MILLIGRAM(S): 500 TABLET, FILM COATED ORAL at 10:38

## 2023-08-25 RX ADMIN — Medication 100 MILLIGRAM(S): at 10:11

## 2023-08-25 RX ADMIN — Medication 100 MILLIEQUIVALENT(S): at 09:50

## 2023-08-25 RX ADMIN — Medication 100 MILLIEQUIVALENT(S): at 13:16

## 2023-08-25 RX ADMIN — Medication 250 MILLIGRAM(S): at 22:42

## 2023-08-25 RX ADMIN — Medication 40 MILLIEQUIVALENT(S): at 09:08

## 2023-08-25 RX ADMIN — Medication 1 APPLICATION(S): at 18:34

## 2023-08-25 RX ADMIN — Medication 1 PACKET(S): at 09:08

## 2023-08-25 RX ADMIN — OXCARBAZEPINE 300 MILLIGRAM(S): 300 TABLET, FILM COATED ORAL at 06:49

## 2023-08-25 RX ADMIN — Medication 100 MILLIGRAM(S): at 00:19

## 2023-08-25 NOTE — PROGRESS NOTE ADULT - PROBLEM SELECTOR PLAN 1
- S/P  abdominal pain, n/v,/ d + lipase - 2483 l  -  acute pancreatitis.- -Gall stone Pancreatitis   - CT A/P : Mild, acute interstitial edematous pancreatitis in the pancreatic tail. .  - RUQ U/S Echogenicity in the region of the gallbladder neck suggests small stones versus gravel. Gallbladder wall thickening identified measuring 7-8 mm. Trace pericholecystic fluid. Correlate for cholecystitis.  - MRCP as above + Distal CBD stone   - IVF NS @100 cc/h--> 75 ml/hr   - f/up lipid panel   - Lipase improving  - GI (Dr Mandujano d/w - Pt will need ERCP and robotic virgil 8/25  -On IV Abx as per ID- IV Vanco , IV Meropenem 1 gm q 8 hrs - S/P  abdominal pain, n/v,/ d + lipase - 2483 l  -  acute pancreatitis.- -Gall stone Pancreatitis   - CT A/P : Mild, acute interstitial edematous pancreatitis in the pancreatic tail. .  - RUQ U/S Echogenicity in the region of the gallbladder neck suggests small stones versus gravel. Gallbladder wall thickening identified measuring 7-8 mm. Trace pericholecystic fluid. Correlate for cholecystitis.  - MRCP as above + Distal CBD stone   - IVF NS @100 cc/h--> 75 ml/hr   - f/up lipid panel   - Lipase improved  - GI (Dr Mandujano d/w - Pt will need ERCP and robotic virgil 8/25  -On IV Abx as per ID- IV Vanco , IV Zosyn, IV azithromycin    Patient currently has no active cardiac conditions. No signs of ischemia, ADHF, clinical exam not consistent with stenotic valvular disease, no unstable arrhythmias noted. Baseline functional capacity is > 4 METS. RCRI score is 0. Patient is currently hemodynamically stable. Patient is medically optimized at this time and understands the inherent risks of surgery. Routine hemodynamic monitoring is suggested. - S/P  abdominal pain, n/v,/ d + lipase - 2483 l  -  acute pancreatitis.- -Gall stone Pancreatitis   - CT A/P : Mild, acute interstitial edematous pancreatitis in the pancreatic tail. .  - RUQ U/S Echogenicity in the region of the gallbladder neck suggests small stones versus gravel. Gallbladder wall thickening identified measuring 7-8 mm. Trace pericholecystic fluid. Correlate for cholecystitis.  - MRCP as above + Distal CBD stone   - IVF NS @100 cc/h--> 75 ml/hr   - f/up lipid panel   - Lipase improved  - GI (Dr Mandujano d/w - Pt will need ERCP and robotic virgil 8/25  -On IV Abx as per ID- IV Zosyn, IV azithromycin    Patient currently has no active cardiac conditions. No signs of ischemia, ADHF, clinical exam not consistent with stenotic valvular disease, no unstable arrhythmias noted. Baseline functional capacity is > 4 METS. RCRI score is 0. Patient is currently hemodynamically stable. Patient is medically optimized at this time and understands the inherent risks of surgery. Routine hemodynamic monitoring is suggested. Ac Cholecystitis with Cholelithiasis   -- RUQ U/S Echogenicity in the region of the gallbladder neck suggests small stones versus gravel. Gallbladder wall thickening identified measuring 7-8 mm. Trace pericholecystic fluid. Correlate for cholecystitis.  - Plan cholecystectomy today 8/25  -Switched back to IV Zosyn from IV meropenem given positive gi pcr  -  MRCP was + AC Cholecystitis & Distal CBD stone   Surgery (Dr xavier)  d/w -OR today 8/25  GI(Dr Mandujano D/W -ERCP for CBD stone today 8/25  Pain meds prn    Patient is medically optimized for procedure

## 2023-08-25 NOTE — PROGRESS NOTE ADULT - SUBJECTIVE AND OBJECTIVE BOX
s/p ercp es stone removal   stone and large hiatal hernia    plan  adv to clears if pain free  gerd precautions  if pain free can d/c home in am  ppi once a day       d/w dr paul

## 2023-08-25 NOTE — PROGRESS NOTE ADULT - SUBJECTIVE AND OBJECTIVE BOX
Post Operative Note  Patient: HOMAR DRAKE 83y (1940) Female   MRN: [id number]  Location: [address]  Visit: 08-22-23 Inpatient  Date: 08-25-23 @ 20:31    Procedure: S/P robot assisted lap virgil with ERCP    Subjective:   Patient seen and examined at bedside, reports is tolerating clear diet, passing flatus.    Patient denies dizziness, chest pain, sob, nausea, vomiting, abdominal pain, or urinary complaints.    Objective:  Vitals: T(F): 97.9 (08-25-23 @ 18:19), Max: 99 (08-25-23 @ 05:00)  HR: 76 (08-25-23 @ 18:19)  BP: 109/71 (08-25-23 @ 18:19) (98/49 - 151/78)  RR: 17 (08-25-23 @ 18:19)  SpO2: 92% (08-25-23 @ 18:19)  Vent Settings:     In:   IV Fluids: cholecalciferol 1000 Unit(s) Oral daily      Out:   EBL:   Voided Urine: yes  Sánchez Catheter: no   Drains: no      GENERAL:  Well-developed Female lying comfortably in bed in NAD.  HEENT:  NC/AT. Sclera white. Mucous membranes moist.  CARDIO:  Regular rate and rhythm.  No murmur, gallop or rub appreciated.  RESPIRATORY:  Nonlabored breathing, no accessory muscle use. Lungs clear to auscultation bilaterally.  No wheezing, rales or rhonchi appreciated.  ABDOMEN:  Soft, nondistended, nontender. BS appreciated on auscultation.  No rebound tenderness or guarding.  Incisions with dermabond in place.   EXTREMITIES: No calf tenderness bilaterally.  SKIN:  No jaundice, pallor, or cyanosis  NEURO:  A&O       Medications: [Standing]  acetaminophen     Tablet .. 650 milliGRAM(s) Oral every 6 hours PRN  ammonium lactate 12% Lotion 1 Application(s) Topical two times a day  atorvastatin 80 milliGRAM(s) Oral at bedtime  cholecalciferol 1000 Unit(s) Oral daily  escitalopram 20 milliGRAM(s) Oral daily  lactobacillus acidophilus 1 Tablet(s) Oral daily  melatonin 3 milliGRAM(s) Oral at bedtime PRN  morphine  - Injectable 0.5 milliGRAM(s) IV Push every 4 hours PRN  morphine  - Injectable 1 milliGRAM(s) IV Push every 4 hours PRN  morphine  - Injectable 2 milliGRAM(s) IV Push every 4 hours PRN  ondansetron Injectable 4 milliGRAM(s) IV Push every 8 hours PRN  OXcarbazepine 300 milliGRAM(s) Oral two times a day  pantoprazole    Tablet 40 milliGRAM(s) Oral before breakfast  piperacillin/tazobactam IVPB.. 3.375 Gram(s) IV Intermittent every 8 hours  vancomycin  IVPB 750 milliGRAM(s) IV Intermittent every 12 hours    Medications: [PRN]  acetaminophen     Tablet .. 650 milliGRAM(s) Oral every 6 hours PRN  ammonium lactate 12% Lotion 1 Application(s) Topical two times a day  atorvastatin 80 milliGRAM(s) Oral at bedtime  cholecalciferol 1000 Unit(s) Oral daily  escitalopram 20 milliGRAM(s) Oral daily  lactobacillus acidophilus 1 Tablet(s) Oral daily  melatonin 3 milliGRAM(s) Oral at bedtime PRN  morphine  - Injectable 1 milliGRAM(s) IV Push every 4 hours PRN  morphine  - Injectable 2 milliGRAM(s) IV Push every 4 hours PRN  morphine  - Injectable 0.5 milliGRAM(s) IV Push every 4 hours PRN  ondansetron Injectable 4 milliGRAM(s) IV Push every 8 hours PRN  OXcarbazepine 300 milliGRAM(s) Oral two times a day  pantoprazole    Tablet 40 milliGRAM(s) Oral before breakfast  piperacillin/tazobactam IVPB.. 3.375 Gram(s) IV Intermittent every 8 hours  vancomycin  IVPB 750 milliGRAM(s) IV Intermittent every 12 hours    Labs:                        9.9    10.64 )-----------( 263      ( 25 Aug 2023 06:02 )             30.7     08-25    138  |  105  |  7   ----------------------------<  88  3.9   |  23  |  0.43<L>    Ca    8.6      25 Aug 2023 13:30  Phos  2.3     08-25  Mg     1.9     08-25    TPro  6.7  /  Alb  2.5<L>  /  TBili  0.6  /  DBili  x   /  AST  33  /  ALT  31  /  AlkPhos  129<H>  08-25    PT/INR - ( 25 Aug 2023 06:02 )   PT: 12.1 sec;   INR: 1.04 ratio         PTT - ( 25 Aug 2023 06:02 )  PTT:30.8 sec      Imaging:  No post-op imaging studies    Assessment:  83yFemale patient S/P robot assisted lap virgil with ERCP    Plan:  - Continue diet- currently on clears- will advance as tolerated  - Continue abx- Zosyn, Vanco  - Pain control prn  - Zofran PRN  - Incentive spirometry  - Encourage ambulation  - SCDs  - DVT prophylaxis with lovenox to be started tomorrow- most likely- had been on Lovenox pre op  - Follow up AM labs  - Will continue to monitor

## 2023-08-25 NOTE — PROGRESS NOTE ADULT - PROBLEM SELECTOR PLAN 6
VTE pro: Lovenox 40mg qd  - will hold ASA and Lovenox prior to procedure splenic artery aneurysm "near completely peripherally calcified 1.1cm" seen ct a/p  - Varicose veins seen more on right lower extremity   - Vascular (Dr. Rubio)- NEG DVT - No Vascular intervention   - No acute intervention at this time continue home Lexapro and trileptal ( for depression)   follow psych Dr MEJIA  out patient

## 2023-08-25 NOTE — PROGRESS NOTE ADULT - PROBLEM SELECTOR PLAN 3
- Patient presents with mild erythema LL Ext ,+ itching and tender to touch  likely 2/2 LLE cellulitis  - Given 1 x dose IV zosyn , 2L NS Bolus in the ED  - c/w Meropenem 1 gm q 8 hrs  in the setting of Diarrhea   - IV Vancomycin started on 8/24 because of lack of improvement  - Bcx and Ucx, NGTD  - MRSA PCR negative   - Lower extremity doppler us negative  - WBC trending up  - ID Dr. AMBER Kwok d/w Patient c/o of diarrhea w/ suprapubic pain and associated increased urinary frequency  - GI PCR --> +EPEC  - IV azithromycin, c/w IV zosyn   - Discontinue meropenem  - Replete electrolytes PRN  - ID (Dr. Kwok) following, f/u recs    #Hypokalemia   - K 3.0 this AM  - STAT KCl 40meq x2, STAT K riders 10meq x3     #hypophosphatemia   - Phos 2.3 this AM  - Given Na-Phos packet x1 Multifactorial etiology with AC Shama & AC Pancreatitis with IV Hydration & Daily Lab Draws   CBC to follow -No Bleeding

## 2023-08-25 NOTE — DIETITIAN INITIAL EVALUATION ADULT - PROBLEM SELECTOR PLAN 3
- Upper  abdominal pain at epigastric area radiates R and L upper abdomen, radiates to shoulders and lower back, now much better + nausea, vomiting resolved + loose stools x4 in ER   - CT A/P : Mild, acute interstitial edematous pancreatitis in the pancreatic tail. .  - CXR : Intrathoracic gastric fundal and body herniation.   - RUQ U/S Echogenicity in the region of the gallbladder neck suggests small stones versus gravel. Gallbladder wall thickening identified measuring 7-8 mm. Trace pericholecystic fluid. Correlate for cholecystitis.  - Mahmood's sign NEGATIVE   - s/ p  1x 1v Tylenol, zosyn, potassium 40mg X1,  2L NS in ER   - MRCP ordered   - NPO Except meds, IVF  -  Surgery consulted Dr. Dawn, will wait till resolution of pancreatitis, then plan for OR for lap virgil pending clinical course  - Gi Dr hicks consulted

## 2023-08-25 NOTE — PROGRESS NOTE ADULT - PROBLEM SELECTOR PLAN 7
VTE pro: Lovenox 40mg qd  - will hold ASA and Lovenox prior to procedure splenic artery aneurysm "near completely peripherally calcified 1.1cm" seen ct a/p  - Varicose veins seen more on right lower extremity   - Vascular (Dr. Rubio)- NEG DVT - No Vascular intervention   - No acute intervention at this time

## 2023-08-25 NOTE — PROGRESS NOTE ADULT - PROBLEM SELECTOR PLAN 4
continue home Lexapro and trileptal ( for depression)   follow psych Dr MEJIA  out patient - Patient presents with mild erythema LL Ext ,+ itching and tender to touch  likely 2/2 LLE cellulitis  - Given 1 x dose IV zosyn , 2L NS Bolus in the ED  - Switched back to IV zosyn  - IV Vancomycin started on 8/24 because of lack of improvement  - Bcx and Ucx, NGTD  - MRSA PCR negative   - Lower extremity doppler us negative  - WBC downtrending  - ID Dr. AMBER Kwok d/w Patient c/o of diarrhea w/ suprapubic pain and associated increased urinary frequency  - GI PCR --> +EPEC  - IV azithromycin x 1 dose , c/w IV zosyn   - Discontinue meropenem  - Replete electrolytes PRN  - ID (Dr. Kwok) following, f/u recs    #Hypokalemia   - K 3.0 this AM  - STAT KCl 40meq x2, STAT K riders 10meq x3     #hypophosphatemia   - Phos 2.3 this AM  - Given Na-Phos packet x1

## 2023-08-25 NOTE — DIETITIAN INITIAL EVALUATION ADULT - PERTINENT MEDS FT
MEDICATIONS  (STANDING):  ammonium lactate 12% Lotion 1 Application(s) Topical two times a day  aspirin enteric coated 81 milliGRAM(s) Oral daily  atorvastatin 80 milliGRAM(s) Oral at bedtime  cholecalciferol 1000 Unit(s) Oral daily  enoxaparin Injectable 40 milliGRAM(s) SubCutaneous every 24 hours  escitalopram 20 milliGRAM(s) Oral daily  lactobacillus acidophilus 1 Tablet(s) Oral daily  meropenem  IVPB 1000 milliGRAM(s) IV Intermittent every 8 hours  OXcarbazepine 300 milliGRAM(s) Oral two times a day  pantoprazole    Tablet 40 milliGRAM(s) Oral before breakfast  sodium chloride 0.9%. 1000 milliLiter(s) (75 mL/Hr) IV Continuous <Continuous>  vancomycin  IVPB      vancomycin  IVPB 500 milliGRAM(s) IV Intermittent every 12 hours    MEDICATIONS  (PRN):  acetaminophen     Tablet .. 650 milliGRAM(s) Oral every 6 hours PRN Temp greater or equal to 38C (100.4F), Mild Pain (1 - 3)  melatonin 3 milliGRAM(s) Oral at bedtime PRN Insomnia  morphine  - Injectable 0.5 milliGRAM(s) IV Push every 4 hours PRN Mild Pain (1 - 3)  morphine  - Injectable 2 milliGRAM(s) IV Push every 4 hours PRN Severe Pain (7 - 10)  morphine  - Injectable 1 milliGRAM(s) IV Push every 4 hours PRN Moderate Pain (4 - 6)  ondansetron Injectable 4 milliGRAM(s) IV Push every 8 hours PRN Nausea and/or Vomiting

## 2023-08-25 NOTE — DIETITIAN INITIAL EVALUATION ADULT - PROBLEM SELECTOR PLAN 2
- RUQ U/S Echogenicity in the region of the gallbladder neck suggests small stones versus gravel. Gallbladder wall thickening identified measuring 7-8 mm. Trace pericholecystic fluid. Correlate for cholecystitis.  -IV Zosyn q 8 hrs   Sx-Dr xavier on case  GI-Dr Marques on case  Pain meds  NPO, IVF

## 2023-08-25 NOTE — BRIEF OPERATIVE NOTE - NSICDXBRIEFPROCEDURE_GEN_ALL_CORE_FT
PROCEDURES:  Cholecystectomy, robot-assisted, laparoscopic, using da Amarjit Xi, with cholangiogram if indicated 25-Aug-2023 16:12:46  Hadley Dawn

## 2023-08-25 NOTE — DIETITIAN INITIAL EVALUATION ADULT - PERTINENT LABORATORY DATA
08-25    138  |  104  |  6<L>  ----------------------------<  94  3.0<L>   |  26  |  0.41<L>    Ca    8.8      25 Aug 2023 06:02  Phos  2.3     08-25  Mg     1.9     08-25    TPro  6.7  /  Alb  2.5<L>  /  TBili  0.6  /  DBili  x   /  AST  33  /  ALT  31  /  AlkPhos  129<H>  08-25  A1C with Estimated Average Glucose Result: 6.0 % (04-14-23 @ 10:41)

## 2023-08-25 NOTE — DIETITIAN NUTRITION RISK NOTIFICATION - TREATMENT: THE FOLLOWING DIET HAS BEEN RECOMMENDED
Diet, NPO after Midnight:      NPO Start Date: 24-Aug-2023,   NPO Start Time: 23:59  Except Medications (08-24-23 @ 09:16) [Active]

## 2023-08-25 NOTE — PROGRESS NOTE ADULT - ASSESSMENT
82 y/o F w/ PMHx of HLD, osteoarthritis, GERD, depression/anxiety, varicose veins ,Left  TKR  presents w/ sudden onset 10/10 upper abdominal pain since Sunday evening, pain radiation to bilateral lower back & bilateral shoulders. CT A/P : Mild, acute interstitial edematous pancreatitis in the pancreatic tail. CXR : Intrathoracic gastric fundal and body herniation ..RUQ U/S Echogenicity in the region of the gallbladder neck suggests small stones versus gravel. Gallbladder wall thickening identified measuring 7-8 mm. Trace pericholecystic fluid. Correlate for cholecystitis. Admitted for Gall stone  edematous pancreatitis & AC  Cholecystitis, Cholelithiasis  and Intrathoracic gastric fundal and body herniation

## 2023-08-25 NOTE — SOCIAL WORK PROGRESS NOTE - NSSWPROGRESSNOTE_GEN_ALL_CORE
SW consult received for life [facility]. Pt has h/o depression and anxiety. She follows up with her psychiatrist Dr Zuniga every 2 weeks, and is declining psychotherapy.  Pt is scheduled for the O.R today for ERCP and lap coli. No P.T needs.

## 2023-08-25 NOTE — DIETITIAN INITIAL EVALUATION ADULT - CONTINUE CURRENT NUTRITION CARE PLAN
as appropriate after procedure advance to clear liquids to low fat low Na diet with ensure clear BID/yes

## 2023-08-25 NOTE — PRE-OP CHECKLIST - SELECT TESTS ORDERED
CBC/CMP/PT/PTT/INR/Hepatic Function/Type and Screen/Urinalysis/EKG/CXR
BMP/CBC/PT/PTT/Hepatic Function/EKG/CXR

## 2023-08-25 NOTE — DIETITIAN INITIAL EVALUATION ADULT - OTHER INFO
Reason for Admission: cholecystitis, acute interstitial edematous pancreatitis.  Intrathoracic gastric fundal and body herniation  History of Present Illness:   84 y/o F w/ PMHx of HLD, osteoarthritis, GERD, depression/anxiety, varicose veins, B/L TKR  presents w/ sudden onset 10/10 upper abdominal pain since Sunday evening, pain radiation to bilateral lower back & bilateral shoulders. States  the pain was accompanied w/ nausea and several episodes of emesis that same night. She has had not appetite since then and has not eaten since Sunday night. Admits to chills, weakness, fatigue, nausea, abd bloating and several episodes of diarrhea since arrival to the ED. Denies headaches, chest pain, palpitations, shortness of breath, acid reflux, vomiting or urinary sx. Reports no past hx of pancreatitis. No hx of cancer.   patient  for robotic virgil with ERCP today gall stone pancreatitis  weight 147# admit states #  8/24 loose BM  lipase now WNL  Last colonoscopy was around 5 years ago, family hx of Crohn's - sister and granddaughter. Denies any recent alcohol use, drug abuse.

## 2023-08-25 NOTE — DIETITIAN NUTRITION RISK NOTIFICATION - ADDITIONAL COMMENTS/DIETITIAN RECOMMENDATIONS
patient with 3/9% weight loss over ~ 3 months  advance to clear liquids to low fat low Na diet with ensure clear TID per surgery  recommend MVI

## 2023-08-25 NOTE — PROGRESS NOTE ADULT - SUBJECTIVE AND OBJECTIVE BOX
[facility], Division of Infectious Diseases  [facility], [facility], [facility]  [phone]    Name: HOMAR DRAKE  Age: 83y  Gender: Female  MRN: [id number]    Interval History:  Patient seen and examined at bedside  No acute overnight events. Afebrile  Feeling better  + EPEC  Notes reviewed    Antibiotics:  piperacillin/tazobactam IVPB. 3.375 Gram(s) IV Intermittent once  piperacillin/tazobactam IVPB.- 3.375 Gram(s) IV Intermittent once      Medications:  acetaminophen     Tablet .. 650 milliGRAM(s) Oral every 6 hours PRN  ammonium lactate 12% Lotion 1 Application(s) Topical two times a day  aspirin enteric coated 81 milliGRAM(s) Oral daily  atorvastatin 80 milliGRAM(s) Oral at bedtime  cholecalciferol 1000 Unit(s) Oral daily  enoxaparin Injectable 40 milliGRAM(s) SubCutaneous every 24 hours  escitalopram 20 milliGRAM(s) Oral daily  lactobacillus acidophilus 1 Tablet(s) Oral daily  melatonin 3 milliGRAM(s) Oral at bedtime PRN  morphine  - Injectable 0.5 milliGRAM(s) IV Push every 4 hours PRN  morphine  - Injectable 2 milliGRAM(s) IV Push every 4 hours PRN  morphine  - Injectable 1 milliGRAM(s) IV Push every 4 hours PRN  ondansetron Injectable 4 milliGRAM(s) IV Push every 8 hours PRN  OXcarbazepine 300 milliGRAM(s) Oral two times a day  pantoprazole    Tablet 40 milliGRAM(s) Oral before breakfast  piperacillin/tazobactam IVPB. 3.375 Gram(s) IV Intermittent once  piperacillin/tazobactam IVPB.- 3.375 Gram(s) IV Intermittent once  potassium chloride    Tablet ER 40 milliEquivalent(s) Oral every 4 hours  potassium chloride  10 mEq/100 mL IVPB 10 milliEquivalent(s) IV Intermittent every 1 hour  sodium chloride 0.9%. 1000 milliLiter(s) IV Continuous <Continuous>      Review of Systems:  Review of systems otherwise negative except as previously noted.    Allergies: adhesives (Pruritus; Rash)  No Known Drug Allergies    For details regarding the patient's past medical history, social history, family history, and other miscellaneous elements, please refer the initial infectious diseases consultation and/or the admitting history and physical examination for this admission.    Objective:  Vitals:   T(C): 36.4 (08-25-23 @ 12:48), Max: 37.2 (08-25-23 @ 05:00)  HR: 87 (08-25-23 @ 12:48) (87 - 95)  BP: 122/87 (08-25-23 @ 12:48) (122/87 - 151/78)  RR: 17 (08-25-23 @ 12:48) (17 - 18)  SpO2: 92% (08-25-23 @ 12:48) (91% - 93%)    Physical Examination:  General: no acute distress  HEENT: NC/AT, EOMI,  Cardio: S1, S2 heard, RRR, no murmurs  Resp: decreased b/l breath sounds  Abd: soft, NT, ND,  Ext: no edema or cyanosis  Skin: warm, dry, no visible rash      Laboratory Studies:  CBC:                       9.9    10.64 )-----------( 263      ( 25 Aug 2023 06:02 )             30.7     CMP: 08-25    138  |  104  |  6<L>  ----------------------------<  94  3.0<L>   |  26  |  0.41<L>    Ca    8.8      25 Aug 2023 06:02  Phos  2.3     08-25  Mg     1.9     08-25    TPro  6.7  /  Alb  2.5<L>  /  TBili  0.6  /  DBili  x   /  AST  33  /  ALT  31  /  AlkPhos  129<H>  08-25    LIVER FUNCTIONS - ( 25 Aug 2023 06:02 )  Alb: 2.5 g/dL / Pro: 6.7 g/dL / ALK PHOS: 129 U/L / ALT: 31 U/L / AST: 33 U/L / GGT: x           Urinalysis Basic - ( 25 Aug 2023 06:02 )    Color: x / Appearance: x / SG: x / pH: x  Gluc: 94 mg/dL / Ketone: x  / Bili: x / Urobili: x   Blood: x / Protein: x / Nitrite: x   Leuk Esterase: x / RBC: x / WBC x   Sq Epi: x / Non Sq Epi: x / Bacteria: x        Microbiology: reviewed    Culture - Urine (collected 08-23-23 @ 06:00)  Source: Clean Catch Clean Catch (Midstream)  Preliminary Report (08-24-23 @ 14:54):    50,000 - 99,000 CFU/mL Escherichia coli    Culture - Blood (collected 08-22-23 @ 22:22)  Source: .Blood Blood-Peripheral  Preliminary Report (08-25-23 @ 02:02):    No growth at 48 Hours    Culture - Blood (collected 08-22-23 @ 21:50)  Source: .Blood Blood-Peripheral  Preliminary Report (08-25-23 @ 02:02):    No growth at 48 Hours          Radiology: reviewed

## 2023-08-25 NOTE — PROGRESS NOTE ADULT - SUBJECTIVE AND OBJECTIVE BOX
Patient is a 83y old  Female who presents with a chief complaint of cholecystitis, acute interstitial edematous pancreatitis.  Intrathoracic gastric fundal and body herniation (24 Aug 2023 12:35)    HPI:  82 y/o F w/ PMHx of HLD, osteoarthritis, GERD, depression/anxiety, varicose veins, B/L TKR  presents w/ sudden onset 10/10 upper abdominal pain since Sunday evening, pain radiation to bilateral lower back & bilateral shoulders. States  the pain was accompanied w/ nausea and several episodes of emesis that same night. She has had not appetite since then and has not eaten since Sunday night. Admits to chills, weakness, fatigue, nausea, abd bloating and several episodes of diarrhea since arrival to the ED. Denies headaches, chest pain, palpitations, shortness of breath, acid reflux, vomiting or urinary sx. Reports no past hx of pancreatitis. No hx of cancer.   Last colonoscopy was around 5 years ago, family hx of Crohn's - sister and granddaughter. Denies any recent alcohol use, drug abuse.     IN ED,  VITALS /69 HR 94 RR 20 Temp 98F on RA   PERTINENT LABS: Wbc 12k 82% predominant neutrophils, hb 11.3 RDW index 17.5   K 3.4 Glucose 106 AST 44 ALK po4 146  Lipase 2483  CT A/P : Mild, acute interstitial edematous pancreatitis in the pancreatic tail.  No discrete fluid collections. Edematous gallbladder wall and increased enhancement. The finding may be   secondary the presence of inflammation in the pancreas and  versus acute  cholecystitis. Recommend right upper quadrant abdominal ultrasound for  further evaluation and correlation.  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    Focus of air in the bladder. Correlate with instrumentation versus   infection. Correlate with urinalysis.    CXR : Intrathoracic gastric fundal and body herniation. Please see lower chest images of abdominal CT scan 8/22/2023. No airspace consolidation.    RUQ U/S Echogenicity in the region of the gallbladder neck suggests small stones versus gravel. Gallbladder wall thickening identified measuring 7-8 mm. Trace pericholecystic fluid. Correlate for cholecystitis.  s/ p  1X iv Tylenol zosyn, potassium 40mg X1,  2L NS in ER   EKG shows NSR 87 bpm QTc 462  (22 Aug 2023 20:04)    INTERVAL HPI:  8/23: Patient was seen and examined at bedside. They state they have been experiencing many loose BMs (>8 in the last 24 hours), loose BMs started prior to admission. Patient still has constant epigastric pain now at a 6/10. Patient denies HA, F/C, CP,SOB,Abd pain, dysuria, leg swelling, or leg pain. Going for MRCP today.  8/24: Patient was seen and examined at bedside. They state they have been having epigastric pain, radiating to the shoulder and back consistently still. Rated the pain as 8/10 at the worst, 6/10 when sleeping. She is continuing to have loose bowel movements, and states she had 4 loose bowel movements overnight with tenesmus. Patient states she has itching of her L lower extremity and trouble swallowing with a dry throat. Denies CP, SOB, dysuria, numbness and tingling of the lower extremities. Will be going for ERCP tomorrow. Cholecystectomy   Low H/H , On IV Abx   8/25: Patient seen and examined at bedside. She states she's been having constant loose BMs and has suprapubic pain with increased frequency. d    OVERNIGHT EVENTS:    Home Medications:  celecoxib 200 mg oral capsule: 1 cap(s) orally every 12 hours (22 Aug 2023 19:52)  Crestor 40 mg oral tablet: 1 tab(s) orally once a day (at bedtime) (22 Aug 2023 19:52)  Ecotrin Adult Low Strength 81 mg oral delayed release tablet: 1 tab(s) orally every 12 hours (22 Aug 2023 19:52)  Lexapro 20 mg oral tablet: 1 tab(s) orally once a day (22 Aug 2023 19:52)  pantoprazole 40 mg oral delayed release tablet: 1 tab(s) orally once a day (before a meal) (22 Aug 2023 19:52)  Trileptal 300 mg oral tablet: 1 tab(s) orally 2 times a day (22 Aug 2023 19:52)  Vitamin D3 1000 intl units (25 mcg) oral tablet: 1 tab(s) orally once a day (22 Aug 2023 19:52)      MEDICATIONS  (STANDING):  ammonium lactate 12% Lotion 1 Application(s) Topical two times a day  aspirin enteric coated 81 milliGRAM(s) Oral daily  atorvastatin 80 milliGRAM(s) Oral at bedtime  cholecalciferol 1000 Unit(s) Oral daily  enoxaparin Injectable 40 milliGRAM(s) SubCutaneous every 24 hours  escitalopram 20 milliGRAM(s) Oral daily  lactobacillus acidophilus 1 Tablet(s) Oral daily  meropenem  IVPB 1000 milliGRAM(s) IV Intermittent every 8 hours  OXcarbazepine 300 milliGRAM(s) Oral two times a day  pantoprazole    Tablet 40 milliGRAM(s) Oral before breakfast  potassium chloride    Tablet ER 40 milliEquivalent(s) Oral every 4 hours  potassium phosphate / sodium phosphate Powder (PHOS-NaK) 1 Packet(s) Oral once  sodium chloride 0.9%. 1000 milliLiter(s) (75 mL/Hr) IV Continuous <Continuous>  vancomycin  IVPB 500 milliGRAM(s) IV Intermittent every 12 hours  vancomycin  IVPB        MEDICATIONS  (PRN):  acetaminophen     Tablet .. 650 milliGRAM(s) Oral every 6 hours PRN Temp greater or equal to 38C (100.4F), Mild Pain (1 - 3)  melatonin 3 milliGRAM(s) Oral at bedtime PRN Insomnia  morphine  - Injectable 0.5 milliGRAM(s) IV Push every 4 hours PRN Mild Pain (1 - 3)  morphine  - Injectable 2 milliGRAM(s) IV Push every 4 hours PRN Severe Pain (7 - 10)  morphine  - Injectable 1 milliGRAM(s) IV Push every 4 hours PRN Moderate Pain (4 - 6)  ondansetron Injectable 4 milliGRAM(s) IV Push every 8 hours PRN Nausea and/or Vomiting      Allergies    adhesives (Pruritus; Rash)  No Known Drug Allergies    Intolerances    lactose (Diarrhea)      Social History:  Tobacco:  denies  EtOH: denies   Recreational drug use: denies   Lives with: family   Ambulates: with walker (22 Aug 2023 20:04)      REVIEW OF SYSTEMS:  CONSTITUTIONAL: No fever, No chills, No fatigue, No myalgia, No Body ache, No Weakness  EYES: No eye pain,  No visual disturbances, No discharge, NO Redness  ENMT:  No ear pain, No nose bleed, No vertigo; No sinus pain, NO throat pain, No Congestion  NECK: No pain, No stiffness  RESPIRATORY: No cough, NO wheezing, No  hemoptysis, NO  shortness of breath  CARDIOVASCULAR: No chest pain, palpitations  GASTROINTESTINAL: No abdominal pain, NO epigastric pain. No nausea, No vomiting; No diarrhea, No constipation. [  ] BM  GENITOURINARY: No dysuria, No frequency, No urgency, No hematuria, NO incontinence  NEUROLOGICAL: No headaches, No dizziness, No numbness, No tingling, No tremors, No weakness  EXT: No Swelling, No Pain, No Edema  SKIN:  [  ] No itching, burning, rashes, or lesions   MUSCULOSKELETAL: No joint pain ,No Jt swelling; No muscle pain, No back pain, No extremity pain  PSYCHIATRIC: No depression,  No anxiety,  No mood swings ,No difficulty sleeping at night  PAIN SCALE: [  ] None  [  ] Other-  ROS Unable to obtain due to - [  ] Dementia  [  ] Lethargy [  ] Drowsy [  ] Sedated [  ] non verbal  REST OF REVIEW Of SYSTEM - [  ] Normal     Vital Signs Last 24 Hrs  T(C): 37.2 (25 Aug 2023 05:00), Max: 37.2 (25 Aug 2023 05:00)  T(F): 99 (25 Aug 2023 05:00), Max: 99 (25 Aug 2023 05:00)  HR: 95 (25 Aug 2023 05:00) (90 - 95)  BP: 151/78 (25 Aug 2023 05:00) (124/79 - 151/78)  BP(mean): --  RR: 18 (25 Aug 2023 05:00) (18 - 18)  SpO2: 91% (25 Aug 2023 05:00) (91% - 93%)    Parameters below as of 25 Aug 2023 05:00  Patient On (Oxygen Delivery Method): room air      Finger Stick          PHYSICAL EXAM:  GENERAL:  [  ] NAD , [  ] well appearing, [  ] Agitated, [  ] Drowsy,  [  ] Lethargy, [  ] confused   HEAD:  [  ] Normal, [  ] Other  EYES:  [  ] EOMI, [  ] PERRLA, [  ] conjunctiva and sclera clear normal, [  ] Other,  [  ] Pallor,[  ] Discharge  ENMT:  [  ] Normal, [  ] Moist mucous membranes, [  ] Good dentition, [  ] No Thrush  NECK:  [  ] Supple, [  ] No JVD, [  ] Normal thyroid, [  ] Lymphadenopathy [  ] Other  CHEST/LUNG:  [  ] Clear to auscultation bilaterally, [  ] Breath Sounds equal B/L / Decrease, [  ] poor effort  [  ] No rales, [  ] No rhonchi  [  ]  No wheezing,   HEART:  [  ] Regular rate and rhythm, [  ] tachycardia, [  ] Bradycardia,  [  ] irregular  [  ] No murmurs, No rubs, No gallops, [  ] PPM in place (Mfr:  )  ABDOMEN:  [  ] Soft, [  ] Nontender, [  ] Nondistended, [  ] No mass, [  ] Bowel sounds present, [  ] obese  NERVOUS SYSTEM:  [  ] Alert & Oriented X3, [  ] Nonfocal  [  ] Confusion  [  ] Encephalopathic [  ] Sedated [  ] Unable to assess, [  ] Dementia [  ] Other-  EXTREMITIES: [  ] 2+ Peripheral Pulses, No clubbing, No cyanosis,  [  ] edema B/L lower EXT. [  ] PVD stasis skin changes B/L Lower EXT, [  ] wound  LYMPH: No lymphadenopathy noted  SKIN:  [  ] No rashes or lesions, [  ] Pressure Ulcers, [  ] ecchymosis, [  ] Skin Tears, [  ] Other    DIET: Diet, NPO after Midnight:      NPO Start Date: 24-Aug-2023,   NPO Start Time: 23:59  Except Medications (08-24-23 @ 09:16)      LABS:                        9.9    10.64 )-----------( 263      ( 25 Aug 2023 06:02 )             30.7     25 Aug 2023 06:02    138    |  104    |  6      ----------------------------<  94     3.0     |  26     |  0.41     Ca    8.8        25 Aug 2023 06:02  Phos  2.3       25 Aug 2023 06:02  Mg     1.9       25 Aug 2023 06:02    TPro  6.7    /  Alb  2.5    /  TBili  0.6    /  DBili  x      /  AST  33     /  ALT  31     /  AlkPhos  129    25 Aug 2023 06:02    PT/INR - ( 25 Aug 2023 06:02 )   PT: 12.1 sec;   INR: 1.04 ratio         PTT - ( 25 Aug 2023 06:02 )  PTT:30.8 sec  Urinalysis Basic - ( 25 Aug 2023 06:02 )    Color: x / Appearance: x / SG: x / pH: x  Gluc: 94 mg/dL / Ketone: x  / Bili: x / Urobili: x   Blood: x / Protein: x / Nitrite: x   Leuk Esterase: x / RBC: x / WBC x   Sq Epi: x / Non Sq Epi: x / Bacteria: x        Culture Results:   50,000 - 99,000 CFU/mL Escherichia coli (08-23 @ 06:00)  Culture Results:   No growth at 48 Hours (08-22 @ 22:22)  Culture Results:   No growth at 48 Hours (08-22 @ 21:50)                  Culture - Urine (collected 23 Aug 2023 06:00)  Source: Clean Catch Clean Catch (Midstream)  Preliminary Report (24 Aug 2023 14:54):    50,000 - 99,000 CFU/mL Escherichia coli    Culture - Blood (collected 22 Aug 2023 22:22)  Source: .Blood Blood-Peripheral  Preliminary Report (25 Aug 2023 02:02):    No growth at 48 Hours    Culture - Blood (collected 22 Aug 2023 21:50)  Source: .Blood Blood-Peripheral  Preliminary Report (25 Aug 2023 02:02):    No growth at 48 Hours         Anemia Panel:      Thyroid Panel:        Lipase: 271 U/L (08-23-23 @ 08:06)  Amylase: 128 U/L (08-23-23 @ 08:06)  Lipase: 2483 U/L (08-22-23 @ 09:58)          RADIOLOGY & ADDITIONAL TESTS:      HEALTH ISSUES - PROBLEM Dx:  Abdominal pain    Need for prophylactic measure    Pancreatitis    Abnormal finding on CT scan    Cholecystitis    Left leg cellulitis    Anxiety and depression            Consultant(s) Notes Reviewed:  [  ] YES     Care Discussed with [X] Consultants  [  ] Patient  [  ] Family [  ] HCP [  ]   [  ] Social Service  [  ] RN, [  ] Physical Therapy,[  ] Palliative care team  DVT PPX: [  ] Lovenox, [  ] S C Heparin, [  ] Coumadin, [  ] Xarelto, [  ] Eliquis, [  ] Pradaxa, [  ] IV Heparin drip, [  ] SCD [  ] Contraindication 2 to GI Bleed,[  ] Ambulation [  ] Contraindicated 2 to  bleed [  ] Contraindicated 2 to Brain Bleed  Advanced directive: [  ] None, [  ] DNR/DNI Patient is a 83y old  Female who presents with a chief complaint of cholecystitis, acute interstitial edematous pancreatitis.  Intrathoracic gastric fundal and body herniation (24 Aug 2023 12:35)    HPI:  82 y/o F w/ PMHx of HLD, osteoarthritis, GERD, depression/anxiety, varicose veins, B/L TKR  presents w/ sudden onset 10/10 upper abdominal pain since Sunday evening, pain radiation to bilateral lower back & bilateral shoulders. States  the pain was accompanied w/ nausea and several episodes of emesis that same night. She has had not appetite since then and has not eaten since Sunday night. Admits to chills, weakness, fatigue, nausea, abd bloating and several episodes of diarrhea since arrival to the ED. Denies headaches, chest pain, palpitations, shortness of breath, acid reflux, vomiting or urinary sx. Reports no past hx of pancreatitis. No hx of cancer.   Last colonoscopy was around 5 years ago, family hx of Crohn's - sister and granddaughter. Denies any recent alcohol use, drug abuse.     IN ED,  VITALS /69 HR 94 RR 20 Temp 98F on RA   PERTINENT LABS: Wbc 12k 82% predominant neutrophils, hb 11.3 RDW index 17.5   K 3.4 Glucose 106 AST 44 ALK po4 146  Lipase 2483  CT A/P : Mild, acute interstitial edematous pancreatitis in the pancreatic tail.  No discrete fluid collections. Edematous gallbladder wall and increased enhancement. The finding may be   secondary the presence of inflammation in the pancreas and  versus acute  cholecystitis. Recommend right upper quadrant abdominal ultrasound for  further evaluation and correlation.  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    Focus of air in the bladder. Correlate with instrumentation versus   infection. Correlate with urinalysis.    CXR : Intrathoracic gastric fundal and body herniation. Please see lower chest images of abdominal CT scan 8/22/2023. No airspace consolidation.    RUQ U/S Echogenicity in the region of the gallbladder neck suggests small stones versus gravel. Gallbladder wall thickening identified measuring 7-8 mm. Trace pericholecystic fluid. Correlate for cholecystitis.  s/ p  1X iv Tylenol zosyn, potassium 40mg X1,  2L NS in ER   EKG shows NSR 87 bpm QTc 462  (22 Aug 2023 20:04)    INTERVAL HPI:  8/23: Patient was seen and examined at bedside. They state they have been experiencing many loose BMs (>8 in the last 24 hours), loose BMs started prior to admission. Patient still has constant epigastric pain now at a 6/10. Patient denies HA, F/C, CP,SOB,Abd pain, dysuria, leg swelling, or leg pain. Going for MRCP today.  8/24: Patient was seen and examined at bedside. They state they have been having epigastric pain, radiating to the shoulder and back consistently still. Rated the pain as 8/10 at the worst, 6/10 when sleeping. She is continuing to have loose bowel movements, and states she had 4 loose bowel movements overnight with tenesmus. Patient states she has itching of her L lower extremity and trouble swallowing with a dry throat. Denies CP, SOB, dysuria, numbness and tingling of the lower extremities. Will be going for ERCP tomorrow. Cholecystectomy   Low H/H , On IV Abx   8/25: Patient seen and examined at bedside. She states she's been having constant loose BMs and has suprapubic pain with increased frequency. Itching of her L lower extremity has improved with lac-hydrin lotion. Denies cp, sob, numbness/tingling of LE. Plan for ERCP and cholecystectomy today. Stable H/H, on IV abx    OVERNIGHT EVENTS:    Home Medications:  celecoxib 200 mg oral capsule: 1 cap(s) orally every 12 hours (22 Aug 2023 19:52)  Crestor 40 mg oral tablet: 1 tab(s) orally once a day (at bedtime) (22 Aug 2023 19:52)  Ecotrin Adult Low Strength 81 mg oral delayed release tablet: 1 tab(s) orally every 12 hours (22 Aug 2023 19:52)  Lexapro 20 mg oral tablet: 1 tab(s) orally once a day (22 Aug 2023 19:52)  pantoprazole 40 mg oral delayed release tablet: 1 tab(s) orally once a day (before a meal) (22 Aug 2023 19:52)  Trileptal 300 mg oral tablet: 1 tab(s) orally 2 times a day (22 Aug 2023 19:52)  Vitamin D3 1000 intl units (25 mcg) oral tablet: 1 tab(s) orally once a day (22 Aug 2023 19:52)      MEDICATIONS  (STANDING):  ammonium lactate 12% Lotion 1 Application(s) Topical two times a day  aspirin enteric coated 81 milliGRAM(s) Oral daily  atorvastatin 80 milliGRAM(s) Oral at bedtime  cholecalciferol 1000 Unit(s) Oral daily  enoxaparin Injectable 40 milliGRAM(s) SubCutaneous every 24 hours  escitalopram 20 milliGRAM(s) Oral daily  lactobacillus acidophilus 1 Tablet(s) Oral daily  meropenem  IVPB 1000 milliGRAM(s) IV Intermittent every 8 hours  OXcarbazepine 300 milliGRAM(s) Oral two times a day  pantoprazole    Tablet 40 milliGRAM(s) Oral before breakfast  potassium chloride    Tablet ER 40 milliEquivalent(s) Oral every 4 hours  potassium phosphate / sodium phosphate Powder (PHOS-NaK) 1 Packet(s) Oral once  sodium chloride 0.9%. 1000 milliLiter(s) (75 mL/Hr) IV Continuous <Continuous>  vancomycin  IVPB 500 milliGRAM(s) IV Intermittent every 12 hours  vancomycin  IVPB        MEDICATIONS  (PRN):  acetaminophen     Tablet .. 650 milliGRAM(s) Oral every 6 hours PRN Temp greater or equal to 38C (100.4F), Mild Pain (1 - 3)  melatonin 3 milliGRAM(s) Oral at bedtime PRN Insomnia  morphine  - Injectable 0.5 milliGRAM(s) IV Push every 4 hours PRN Mild Pain (1 - 3)  morphine  - Injectable 2 milliGRAM(s) IV Push every 4 hours PRN Severe Pain (7 - 10)  morphine  - Injectable 1 milliGRAM(s) IV Push every 4 hours PRN Moderate Pain (4 - 6)  ondansetron Injectable 4 milliGRAM(s) IV Push every 8 hours PRN Nausea and/or Vomiting      Allergies    adhesives (Pruritus; Rash)  No Known Drug Allergies    Intolerances    lactose (Diarrhea)      Social History:  Tobacco:  denies  EtOH: denies   Recreational drug use: denies   Lives with: family   Ambulates: with walker (22 Aug 2023 20:04)      REVIEW OF SYSTEMS:  CONSTITUTIONAL: No fever, No chills, No fatigue, No myalgia, No Body ache, No Weakness  EYES: No eye pain,  No visual disturbances, No discharge, NO Redness  ENMT:  No ear pain, No nose bleed, No vertigo; No sinus pain, NO throat pain, No Congestion  NECK: No pain, No stiffness  RESPIRATORY: No cough, NO wheezing, No  hemoptysis, NO  shortness of breath  CARDIOVASCULAR: No chest pain, palpitations  GASTROINTESTINAL: No abdominal pain, NO epigastric pain. No nausea, No vomiting; No diarrhea, No constipation. [  ] BM  GENITOURINARY: No dysuria, No frequency, No urgency, No hematuria, NO incontinence  NEUROLOGICAL: No headaches, No dizziness, No numbness, No tingling, No tremors, No weakness  EXT: No Swelling, No Pain, No Edema  SKIN:  [  ] No itching, burning, rashes, or lesions   MUSCULOSKELETAL: No joint pain ,No Jt swelling; No muscle pain, No back pain, No extremity pain  PSYCHIATRIC: No depression,  No anxiety,  No mood swings ,No difficulty sleeping at night  PAIN SCALE: [  ] None  [  ] Other-  ROS Unable to obtain due to - [  ] Dementia  [  ] Lethargy [  ] Drowsy [  ] Sedated [  ] non verbal  REST OF REVIEW Of SYSTEM - [  ] Normal     Vital Signs Last 24 Hrs  T(C): 37.2 (25 Aug 2023 05:00), Max: 37.2 (25 Aug 2023 05:00)  T(F): 99 (25 Aug 2023 05:00), Max: 99 (25 Aug 2023 05:00)  HR: 95 (25 Aug 2023 05:00) (90 - 95)  BP: 151/78 (25 Aug 2023 05:00) (124/79 - 151/78)  BP(mean): --  RR: 18 (25 Aug 2023 05:00) (18 - 18)  SpO2: 91% (25 Aug 2023 05:00) (91% - 93%)    Parameters below as of 25 Aug 2023 05:00  Patient On (Oxygen Delivery Method): room air      Finger Stick          PHYSICAL EXAM:  GENERAL:  [  ] NAD , [  ] well appearing, [  ] Agitated, [  ] Drowsy,  [  ] Lethargy, [  ] confused   HEAD:  [  ] Normal, [  ] Other  EYES:  [  ] EOMI, [  ] PERRLA, [  ] conjunctiva and sclera clear normal, [  ] Other,  [  ] Pallor,[  ] Discharge  ENMT:  [  ] Normal, [  ] Moist mucous membranes, [  ] Good dentition, [  ] No Thrush  NECK:  [  ] Supple, [  ] No JVD, [  ] Normal thyroid, [  ] Lymphadenopathy [  ] Other  CHEST/LUNG:  [  ] Clear to auscultation bilaterally, [  ] Breath Sounds equal B/L / Decrease, [  ] poor effort  [  ] No rales, [  ] No rhonchi  [  ]  No wheezing,   HEART:  [  ] Regular rate and rhythm, [  ] tachycardia, [  ] Bradycardia,  [  ] irregular  [  ] No murmurs, No rubs, No gallops, [  ] PPM in place (Mfr:  )  ABDOMEN:  [  ] Soft, [  ] Nontender, [  ] Nondistended, [  ] No mass, [  ] Bowel sounds present, [  ] obese  NERVOUS SYSTEM:  [  ] Alert & Oriented X3, [  ] Nonfocal  [  ] Confusion  [  ] Encephalopathic [  ] Sedated [  ] Unable to assess, [  ] Dementia [  ] Other-  EXTREMITIES: [  ] 2+ Peripheral Pulses, No clubbing, No cyanosis,  [  ] edema B/L lower EXT. [  ] PVD stasis skin changes B/L Lower EXT, [  ] wound  LYMPH: No lymphadenopathy noted  SKIN:  [  ] No rashes or lesions, [  ] Pressure Ulcers, [  ] ecchymosis, [  ] Skin Tears, [  ] Other    DIET: Diet, NPO after Midnight:      NPO Start Date: 24-Aug-2023,   NPO Start Time: 23:59  Except Medications (08-24-23 @ 09:16)      LABS:                        9.9    10.64 )-----------( 263      ( 25 Aug 2023 06:02 )             30.7     25 Aug 2023 06:02    138    |  104    |  6      ----------------------------<  94     3.0     |  26     |  0.41     Ca    8.8        25 Aug 2023 06:02  Phos  2.3       25 Aug 2023 06:02  Mg     1.9       25 Aug 2023 06:02    TPro  6.7    /  Alb  2.5    /  TBili  0.6    /  DBili  x      /  AST  33     /  ALT  31     /  AlkPhos  129    25 Aug 2023 06:02    PT/INR - ( 25 Aug 2023 06:02 )   PT: 12.1 sec;   INR: 1.04 ratio         PTT - ( 25 Aug 2023 06:02 )  PTT:30.8 sec  Urinalysis Basic - ( 25 Aug 2023 06:02 )    Color: x / Appearance: x / SG: x / pH: x  Gluc: 94 mg/dL / Ketone: x  / Bili: x / Urobili: x   Blood: x / Protein: x / Nitrite: x   Leuk Esterase: x / RBC: x / WBC x   Sq Epi: x / Non Sq Epi: x / Bacteria: x        Culture Results:   50,000 - 99,000 CFU/mL Escherichia coli (08-23 @ 06:00)  Culture Results:   No growth at 48 Hours (08-22 @ 22:22)  Culture Results:   No growth at 48 Hours (08-22 @ 21:50)                  Culture - Urine (collected 23 Aug 2023 06:00)  Source: Clean Catch Clean Catch (Midstream)  Preliminary Report (24 Aug 2023 14:54):    50,000 - 99,000 CFU/mL Escherichia coli    Culture - Blood (collected 22 Aug 2023 22:22)  Source: .Blood Blood-Peripheral  Preliminary Report (25 Aug 2023 02:02):    No growth at 48 Hours    Culture - Blood (collected 22 Aug 2023 21:50)  Source: .Blood Blood-Peripheral  Preliminary Report (25 Aug 2023 02:02):    No growth at 48 Hours         Anemia Panel:      Thyroid Panel:        Lipase: 271 U/L (08-23-23 @ 08:06)  Amylase: 128 U/L (08-23-23 @ 08:06)  Lipase: 2483 U/L (08-22-23 @ 09:58)          RADIOLOGY & ADDITIONAL TESTS:      HEALTH ISSUES - PROBLEM Dx:  Abdominal pain    Need for prophylactic measure    Pancreatitis    Abnormal finding on CT scan    Cholecystitis    Left leg cellulitis    Anxiety and depression            Consultant(s) Notes Reviewed:  [  ] YES     Care Discussed with [X] Consultants  [  ] Patient  [  ] Family [  ] HCP [  ]   [  ] Social Service  [  ] RN, [  ] Physical Therapy,[  ] Palliative care team  DVT PPX: [  ] Lovenox, [  ] S C Heparin, [  ] Coumadin, [  ] Xarelto, [  ] Eliquis, [  ] Pradaxa, [  ] IV Heparin drip, [  ] SCD [  ] Contraindication 2 to GI Bleed,[  ] Ambulation [  ] Contraindicated 2 to  bleed [  ] Contraindicated 2 to Brain Bleed  Advanced directive: [  ] None, [  ] DNR/DNI Patient is a 83y old  Female who presents with a chief complaint of cholecystitis, acute interstitial edematous pancreatitis.  Intrathoracic gastric fundal and body herniation (24 Aug 2023 12:35)    HPI:  82 y/o F w/ PMHx of HLD, osteoarthritis, GERD, depression/anxiety, varicose veins, B/L TKR  presents w/ sudden onset 10/10 upper abdominal pain since Sunday evening, pain radiation to bilateral lower back & bilateral shoulders. States  the pain was accompanied w/ nausea and several episodes of emesis that same night. She has had not appetite since then and has not eaten since Sunday night. Admits to chills, weakness, fatigue, nausea, abd bloating and several episodes of diarrhea since arrival to the ED. Denies headaches, chest pain, palpitations, shortness of breath, acid reflux, vomiting or urinary sx. Reports no past hx of pancreatitis. No hx of cancer.   Last colonoscopy was around 5 years ago, family hx of Crohn's - sister and granddaughter. Denies any recent alcohol use, drug abuse.     IN ED,  VITALS /69 HR 94 RR 20 Temp 98F on RA   PERTINENT LABS: Wbc 12k 82% predominant neutrophils, hb 11.3 RDW index 17.5   K 3.4 Glucose 106 AST 44 ALK po4 146  Lipase 2483  CT A/P : Mild, acute interstitial edematous pancreatitis in the pancreatic tail.  No discrete fluid collections. Edematous gallbladder wall and increased enhancement. The finding may be   secondary the presence of inflammation in the pancreas and  versus acute  cholecystitis. Recommend right upper quadrant abdominal ultrasound for  further evaluation and correlation.  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    Focus of air in the bladder. Correlate with instrumentation versus   infection. Correlate with urinalysis.    CXR : Intrathoracic gastric fundal and body herniation. Please see lower chest images of abdominal CT scan 8/22/2023. No airspace consolidation.    RUQ U/S Echogenicity in the region of the gallbladder neck suggests small stones versus gravel. Gallbladder wall thickening identified measuring 7-8 mm. Trace pericholecystic fluid. Correlate for cholecystitis.  s/ p  1X iv Tylenol zosyn, potassium 40mg X1,  2L NS in ER   EKG shows NSR 87 bpm QTc 462  (22 Aug 2023 20:04)    INTERVAL HPI:  8/23: Patient was seen and examined at bedside. They state they have been experiencing many loose BMs (>8 in the last 24 hours), loose BMs started prior to admission. Patient still has constant epigastric pain now at a 6/10. Patient denies HA, F/C, CP,SOB,Abd pain, dysuria, leg swelling, or leg pain. Going for MRCP today.  8/24: Patient was seen and examined at bedside. They state they have been having epigastric pain, radiating to the shoulder and back consistently still. Rated the pain as 8/10 at the worst, 6/10 when sleeping. She is continuing to have loose bowel movements, and states she had 4 loose bowel movements overnight with tenesmus. Patient states she has itching of her L lower extremity and trouble swallowing with a dry throat. Denies CP, SOB, dysuria, numbness and tingling of the lower extremities. Will be going for ERCP tomorrow. Cholecystectomy   Low H/H , On IV Abx   8/25: Patient seen and examined at bedside. She states she's been having constant loose BMs and has suprapubic pain with increased frequency. Itching of her L lower extremity has improved with lac-hydrin lotion. Denies cp, sob, numbness/tingling of LE. Plan for ERCP and cholecystectomy today. Stable H/H, on IV abx, Hypo K -replaced     OVERNIGHT EVENTS: none    Home Medications:  celecoxib 200 mg oral capsule: 1 cap(s) orally every 12 hours (22 Aug 2023 19:52)  Crestor 40 mg oral tablet: 1 tab(s) orally once a day (at bedtime) (22 Aug 2023 19:52)  Ecotrin Adult Low Strength 81 mg oral delayed release tablet: 1 tab(s) orally every 12 hours (22 Aug 2023 19:52)  Lexapro 20 mg oral tablet: 1 tab(s) orally once a day (22 Aug 2023 19:52)  pantoprazole 40 mg oral delayed release tablet: 1 tab(s) orally once a day (before a meal) (22 Aug 2023 19:52)  Trileptal 300 mg oral tablet: 1 tab(s) orally 2 times a day (22 Aug 2023 19:52)  Vitamin D3 1000 intl units (25 mcg) oral tablet: 1 tab(s) orally once a day (22 Aug 2023 19:52)      MEDICATIONS  (STANDING):  ammonium lactate 12% Lotion 1 Application(s) Topical two times a day  aspirin enteric coated 81 milliGRAM(s) Oral daily  atorvastatin 80 milliGRAM(s) Oral at bedtime  cholecalciferol 1000 Unit(s) Oral daily  enoxaparin Injectable 40 milliGRAM(s) SubCutaneous every 24 hours  escitalopram 20 milliGRAM(s) Oral daily  lactobacillus acidophilus 1 Tablet(s) Oral daily  meropenem  IVPB 1000 milliGRAM(s) IV Intermittent every 8 hours  OXcarbazepine 300 milliGRAM(s) Oral two times a day  pantoprazole    Tablet 40 milliGRAM(s) Oral before breakfast  potassium chloride    Tablet ER 40 milliEquivalent(s) Oral every 4 hours  potassium phosphate / sodium phosphate Powder (PHOS-NaK) 1 Packet(s) Oral once  sodium chloride 0.9%. 1000 milliLiter(s) (75 mL/Hr) IV Continuous <Continuous>  vancomycin  IVPB 500 milliGRAM(s) IV Intermittent every 12 hours  vancomycin  IVPB        MEDICATIONS  (PRN):  acetaminophen     Tablet .. 650 milliGRAM(s) Oral every 6 hours PRN Temp greater or equal to 38C (100.4F), Mild Pain (1 - 3)  melatonin 3 milliGRAM(s) Oral at bedtime PRN Insomnia  morphine  - Injectable 0.5 milliGRAM(s) IV Push every 4 hours PRN Mild Pain (1 - 3)  morphine  - Injectable 2 milliGRAM(s) IV Push every 4 hours PRN Severe Pain (7 - 10)  morphine  - Injectable 1 milliGRAM(s) IV Push every 4 hours PRN Moderate Pain (4 - 6)  ondansetron Injectable 4 milliGRAM(s) IV Push every 8 hours PRN Nausea and/or Vomiting      Allergies    adhesives (Pruritus; Rash)  No Known Drug Allergies    Intolerances    lactose (Diarrhea)      Social History:  Tobacco:  denies  EtOH: denies   Recreational drug use: denies   Lives with: family   Ambulates: with walker (22 Aug 2023 20:04)      REVIEW OF SYSTEMS:  CONSTITUTIONAL: No fever, No chills, No fatigue, No myalgia, No Body ache, No Weakness  EYES: No eye pain,  No visual disturbances, No discharge, NO Redness  ENMT:  No ear pain, No nose bleed, No vertigo; No sinus pain, NO throat pain, No Congestion  NECK: No pain, No stiffness  RESPIRATORY: No cough, NO wheezing, No  hemoptysis, NO  shortness of breath  CARDIOVASCULAR: No chest pain, palpitations  GASTROINTESTINAL: No abdominal pain, NO epigastric pain. No nausea, No vomiting; + diarrhea, No constipation. [ x ] BM  GENITOURINARY: No dysuria, No frequency, No urgency, No hematuria, NO incontinence  NEUROLOGICAL: No headaches, No dizziness, No numbness, No tingling, No tremors, No weakness  EXT: No Swelling, No Pain, No Edema  SKIN:  [ x ] No itching, burning, rashes, or lesions   MUSCULOSKELETAL: No joint pain ,No Jt swelling; No muscle pain, No back pain, No extremity pain  PSYCHIATRIC: No depression,  No anxiety,  No mood swings ,No difficulty sleeping at night  PAIN SCALE: [  ] None  [ x ] Other-  ROS Unable to obtain due to - [  ] Dementia  [  ] Lethargy [  ] Drowsy [  ] Sedated [  ] non verbal  REST OF REVIEW Of SYSTEM - [ x ] Normal     Vital Signs Last 24 Hrs  T(C): 37.2 (25 Aug 2023 05:00), Max: 37.2 (25 Aug 2023 05:00)  T(F): 99 (25 Aug 2023 05:00), Max: 99 (25 Aug 2023 05:00)  HR: 95 (25 Aug 2023 05:00) (90 - 95)  BP: 151/78 (25 Aug 2023 05:00) (124/79 - 151/78)  BP(mean): --  RR: 18 (25 Aug 2023 05:00) (18 - 18)  SpO2: 91% (25 Aug 2023 05:00) (91% - 93%)    Parameters below as of 25 Aug 2023 05:00  Patient On (Oxygen Delivery Method): room air      Finger Stick          PHYSICAL EXAM:  GENERAL:  [ x ] NAD , [ x ] well appearing, [  ] Agitated, [  ] Drowsy,  [  ] Lethargy, [  ] confused   HEAD:  [ x ] Normal, [  ] Other  EYES:  [ x ] EOMI, [ x ] PERRLA, [x  ] conjunctiva and sclera clear normal, [  ] Other,  [  ] Pallor,[  ] Discharge  ENMT:  [ x ] Normal, [ x ] Moist mucous membranes, [ x ] Good dentition, [ x ] No Thrush  NECK:  [x  ] Supple, [x  ] No JVD, [x  ] Normal thyroid, [  ] Lymphadenopathy [  ] Other  CHEST/LUNG:  [x  ] Clear to auscultation bilaterally, [x  ] Breath Sounds equal B/L / Decrease, [  ] poor effort  [x  ] No rales, [ x ] No rhonchi  [ x ]  No wheezing,   HEART:  [x  ] Regular rate and rhythm, [  ] tachycardia, [  ] Bradycardia,  [  ] irregular  [ x ] No murmurs, No rubs, No gallops, [  ] PPM in place (Mfr:  )  ABDOMEN:  [ x ] Soft, [ x ] Nontender, [ x ] Nondistended, [x  ] No mass, [ x ] Bowel sounds present, [ x ] obese  NERVOUS SYSTEM:  [x  ] Alert & Oriented X3, [ x ] Nonfocal  [  ] Confusion  [  ] Encephalopathic [  ] Sedated [  ] Unable to assess, [  ] Dementia [  ] Other-  EXTREMITIES: [  ] 2+ Peripheral Pulses, No clubbing, No cyanosis,  [  ] edema B/L lower EXT. [ x ] PVD stasis skin changes B/L Lower EXT, [  ] wound  LYMPH: No lymphadenopathy noted  SKIN:  [  ] No rashes or lesions, [  ] Pressure Ulcers, [  ] ecchymosis, [  ] Skin Tears, [ x ] Other-+ Left Lower Ext shin Anterior skin erythema + warm , On Calf small are of redness & induration  No Open wound      DIET: Diet, NPO after Midnight:      NPO Start Date: 24-Aug-2023,   NPO Start Time: 23:59  Except Medications (08-24-23 @ 09:16)      LABS:                        9.9    10.64 )-----------( 263      ( 25 Aug 2023 06:02 )             30.7     25 Aug 2023 06:02    138    |  104    |  6      ----------------------------<  94     3.0     |  26     |  0.41     Ca    8.8        25 Aug 2023 06:02  Phos  2.3       25 Aug 2023 06:02  Mg     1.9       25 Aug 2023 06:02    TPro  6.7    /  Alb  2.5    /  TBili  0.6    /  DBili  x      /  AST  33     /  ALT  31     /  AlkPhos  129    25 Aug 2023 06:02    PT/INR - ( 25 Aug 2023 06:02 )   PT: 12.1 sec;   INR: 1.04 ratio         PTT - ( 25 Aug 2023 06:02 )  PTT:30.8 sec  Urinalysis Basic - ( 25 Aug 2023 06:02 )    Color: x / Appearance: x / SG: x / pH: x  Gluc: 94 mg/dL / Ketone: x  / Bili: x / Urobili: x   Blood: x / Protein: x / Nitrite: x   Leuk Esterase: x / RBC: x / WBC x   Sq Epi: x / Non Sq Epi: x / Bacteria: x        Culture Results:   50,000 - 99,000 CFU/mL Escherichia coli (08-23 @ 06:00)  Culture Results:   No growth at 48 Hours (08-22 @ 22:22)  Culture Results:   No growth at 48 Hours (08-22 @ 21:50)                  Culture - Urine (collected 23 Aug 2023 06:00)  Source: Clean Catch Clean Catch (Midstream)  Preliminary Report (24 Aug 2023 14:54):    50,000 - 99,000 CFU/mL Escherichia coli    Culture - Blood (collected 22 Aug 2023 22:22)  Source: .Blood Blood-Peripheral  Preliminary Report (25 Aug 2023 02:02):    No growth at 48 Hours    Culture - Blood (collected 22 Aug 2023 21:50)  Source: .Blood Blood-Peripheral  Preliminary Report (25 Aug 2023 02:02):    No growth at 48 Hours         Anemia Panel:      Thyroid Panel:        Lipase: 271 U/L (08-23-23 @ 08:06)  Amylase: 128 U/L (08-23-23 @ 08:06)  Lipase: 2483 U/L (08-22-23 @ 09:58)          RADIOLOGY & ADDITIONAL TESTS:      HEALTH ISSUES - PROBLEM Dx:  Abdominal pain    Need for prophylactic measure    Pancreatitis    Abnormal finding on CT scan    Cholecystitis    Left leg cellulitis    Anxiety and depression            Consultant(s) Notes Reviewed:  [  ] YES     Care Discussed with [X] Consultants  [  ] Patient  [  ] Family [  ] HCP [  ]   [  ] Social Service  [  ] RN, [  ] Physical Therapy,[  ] Palliative care team  DVT PPX: [  ] Lovenox, [  ] S C Heparin, [  ] Coumadin, [  ] Xarelto, [  ] Eliquis, [  ] Pradaxa, [  ] IV Heparin drip, [  ] SCD [  ] Contraindication 2 to GI Bleed,[  ] Ambulation [  ] Contraindicated 2 to  bleed [  ] Contraindicated 2 to Brain Bleed  Advanced directive: [  ] None, [  ] DNR/DNI Patient is a 83y old  Female who presents with a chief complaint of cholecystitis, acute interstitial edematous pancreatitis.  Intrathoracic gastric fundal and body herniation (24 Aug 2023 12:35)    HPI:  82 y/o F w/ PMHx of HLD, osteoarthritis, GERD, depression/anxiety, varicose veins, B/L TKR  presents w/ sudden onset 10/10 upper abdominal pain since Sunday evening, pain radiation to bilateral lower back & bilateral shoulders. States  the pain was accompanied w/ nausea and several episodes of emesis that same night. She has had not appetite since then and has not eaten since Sunday night. Admits to chills, weakness, fatigue, nausea, abd bloating and several episodes of diarrhea since arrival to the ED. Denies headaches, chest pain, palpitations, shortness of breath, acid reflux, vomiting or urinary sx. Reports no past hx of pancreatitis. No hx of cancer.   Last colonoscopy was around 5 years ago, family hx of Crohn's - sister and granddaughter. Denies any recent alcohol use, drug abuse.     IN ED,  VITALS /69 HR 94 RR 20 Temp 98F on RA   PERTINENT LABS: Wbc 12k 82% predominant neutrophils, hb 11.3 RDW index 17.5   K 3.4 Glucose 106 AST 44 ALK po4 146  Lipase 2483  CT A/P : Mild, acute interstitial edematous pancreatitis in the pancreatic tail.  No discrete fluid collections. Edematous gallbladder wall and increased enhancement. The finding may be   secondary the presence of inflammation in the pancreas and  versus acute  cholecystitis. Recommend right upper quadrant abdominal ultrasound for  further evaluation and correlation.  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    Focus of air in the bladder. Correlate with instrumentation versus   infection. Correlate with urinalysis.    CXR : Intrathoracic gastric fundal and body herniation. Please see lower chest images of abdominal CT scan 8/22/2023. No airspace consolidation.    RUQ U/S Echogenicity in the region of the gallbladder neck suggests small stones versus gravel. Gallbladder wall thickening identified measuring 7-8 mm. Trace pericholecystic fluid. Correlate for cholecystitis.  s/ p  1X iv Tylenol zosyn, potassium 40mg X1,  2L NS in ER   EKG shows NSR 87 bpm QTc 462  (22 Aug 2023 20:04)    INTERVAL HPI:  8/23: Patient was seen and examined at bedside. They state they have been experiencing many loose BMs (>8 in the last 24 hours), loose BMs started prior to admission. Patient still has constant epigastric pain now at a 6/10. Patient denies HA, F/C, CP,SOB,Abd pain, dysuria, leg swelling, or leg pain. Going for MRCP today.  8/24: Patient was seen and examined at bedside. They state they have been having epigastric pain, radiating to the shoulder and back consistently still. Rated the pain as 8/10 at the worst, 6/10 when sleeping. She is continuing to have loose bowel movements, and states she had 4 loose bowel movements overnight with tenesmus. Patient states she has itching of her L lower extremity and trouble swallowing with a dry throat. Denies CP, SOB, dysuria, numbness and tingling of the lower extremities. Will be going for ERCP tomorrow. Cholecystectomy   Low H/H , On IV Abx   8/25: Patient seen and examined at bedside. She states she's been having constant loose BMs and has suprapubic pain with increased frequency. Itching of her L lower extremity has improved with lac-hydrin lotion. Denies cp, sob, numbness/tingling of LE. Plan for ERCP and cholecystectomy today. Stable H/H, on IV abx, Hypo K -replaced NPO for OR & ERCP today , IV Abx & IVF     OVERNIGHT EVENTS: none    Home Medications:  celecoxib 200 mg oral capsule: 1 cap(s) orally every 12 hours (22 Aug 2023 19:52)  Crestor 40 mg oral tablet: 1 tab(s) orally once a day (at bedtime) (22 Aug 2023 19:52)  Ecotrin Adult Low Strength 81 mg oral delayed release tablet: 1 tab(s) orally every 12 hours (22 Aug 2023 19:52)  Lexapro 20 mg oral tablet: 1 tab(s) orally once a day (22 Aug 2023 19:52)  pantoprazole 40 mg oral delayed release tablet: 1 tab(s) orally once a day (before a meal) (22 Aug 2023 19:52)  Trileptal 300 mg oral tablet: 1 tab(s) orally 2 times a day (22 Aug 2023 19:52)  Vitamin D3 1000 intl units (25 mcg) oral tablet: 1 tab(s) orally once a day (22 Aug 2023 19:52)      MEDICATIONS  (STANDING):  ammonium lactate 12% Lotion 1 Application(s) Topical two times a day  aspirin enteric coated 81 milliGRAM(s) Oral daily  atorvastatin 80 milliGRAM(s) Oral at bedtime  cholecalciferol 1000 Unit(s) Oral daily  enoxaparin Injectable 40 milliGRAM(s) SubCutaneous every 24 hours  escitalopram 20 milliGRAM(s) Oral daily  lactobacillus acidophilus 1 Tablet(s) Oral daily  meropenem  IVPB 1000 milliGRAM(s) IV Intermittent every 8 hours  OXcarbazepine 300 milliGRAM(s) Oral two times a day  pantoprazole    Tablet 40 milliGRAM(s) Oral before breakfast  potassium chloride    Tablet ER 40 milliEquivalent(s) Oral every 4 hours  potassium phosphate / sodium phosphate Powder (PHOS-NaK) 1 Packet(s) Oral once  sodium chloride 0.9%. 1000 milliLiter(s) (75 mL/Hr) IV Continuous <Continuous>  vancomycin  IVPB 500 milliGRAM(s) IV Intermittent every 12 hours  vancomycin  IVPB        MEDICATIONS  (PRN):  acetaminophen     Tablet .. 650 milliGRAM(s) Oral every 6 hours PRN Temp greater or equal to 38C (100.4F), Mild Pain (1 - 3)  melatonin 3 milliGRAM(s) Oral at bedtime PRN Insomnia  morphine  - Injectable 0.5 milliGRAM(s) IV Push every 4 hours PRN Mild Pain (1 - 3)  morphine  - Injectable 2 milliGRAM(s) IV Push every 4 hours PRN Severe Pain (7 - 10)  morphine  - Injectable 1 milliGRAM(s) IV Push every 4 hours PRN Moderate Pain (4 - 6)  ondansetron Injectable 4 milliGRAM(s) IV Push every 8 hours PRN Nausea and/or Vomiting      Allergies    adhesives (Pruritus; Rash)  No Known Drug Allergies    Intolerances    lactose (Diarrhea)      Social History:  Tobacco:  denies  EtOH: denies   Recreational drug use: denies   Lives with: family   Ambulates: with walker (22 Aug 2023 20:04)      REVIEW OF SYSTEMS:  CONSTITUTIONAL: No fever, No chills, No fatigue, No myalgia, No Body ache, No Weakness  EYES: No eye pain,  No visual disturbances, No discharge, NO Redness  ENMT:  No ear pain, No nose bleed, No vertigo; No sinus pain, NO throat pain, No Congestion  NECK: No pain, No stiffness  RESPIRATORY: No cough, NO wheezing, No  hemoptysis, NO  shortness of breath  CARDIOVASCULAR: No chest pain, palpitations  GASTROINTESTINAL: No abdominal pain, NO epigastric pain. No nausea, No vomiting; + diarrhea, No constipation. [ x ] BM  GENITOURINARY: No dysuria, No frequency, No urgency, No hematuria, NO incontinence  NEUROLOGICAL: No headaches, No dizziness, No numbness, No tingling, No tremors, No weakness  EXT: No Swelling, No Pain, No Edema  SKIN:  [ x ] No itching, burning, rashes, or lesions   MUSCULOSKELETAL: No joint pain ,No Jt swelling; No muscle pain, No back pain, No extremity pain  PSYCHIATRIC: No depression,  No anxiety,  No mood swings ,No difficulty sleeping at night  PAIN SCALE: [  ] None  [ x ] Other-  ROS Unable to obtain due to - [  ] Dementia  [  ] Lethargy [  ] Drowsy [  ] Sedated [  ] non verbal  REST OF REVIEW Of SYSTEM - [ x ] Normal     Vital Signs Last 24 Hrs  T(C): 37.2 (25 Aug 2023 05:00), Max: 37.2 (25 Aug 2023 05:00)  T(F): 99 (25 Aug 2023 05:00), Max: 99 (25 Aug 2023 05:00)  HR: 95 (25 Aug 2023 05:00) (90 - 95)  BP: 151/78 (25 Aug 2023 05:00) (124/79 - 151/78)  BP(mean): --  RR: 18 (25 Aug 2023 05:00) (18 - 18)  SpO2: 91% (25 Aug 2023 05:00) (91% - 93%)    Parameters below as of 25 Aug 2023 05:00  Patient On (Oxygen Delivery Method): room air      Finger Stick          PHYSICAL EXAM:  GENERAL:  [ x ] NAD , [ x ] well appearing, [  ] Agitated, [  ] Drowsy,  [  ] Lethargy, [  ] confused   HEAD:  [ x ] Normal, [  ] Other  EYES:  [ x ] EOMI, [ x ] PERRLA, [x  ] conjunctiva and sclera clear normal, [  ] Other,  [  ] Pallor,[  ] Discharge  ENMT:  [ x ] Normal, [ x ] Moist mucous membranes, [ x ] Good dentition, [ x ] No Thrush  NECK:  [x  ] Supple, [x  ] No JVD, [x  ] Normal thyroid, [  ] Lymphadenopathy [  ] Other  CHEST/LUNG:  [x  ] Clear to auscultation bilaterally, [x  ] Breath Sounds equal B/L / Decrease, [  ] poor effort  [x  ] No rales, [ x ] No rhonchi  [ x ]  No wheezing,   HEART:  [x  ] Regular rate and rhythm, [  ] tachycardia, [  ] Bradycardia,  [  ] irregular  [ x ] No murmurs, No rubs, No gallops, [  ] PPM in place (Mfr:  )  ABDOMEN:  [ x ] Soft, [ x ] Nontender, NO R/R/G  [ x ] Nondistended, [x  ] No mass, [ x ] Bowel sounds present, [ x ] obese  NERVOUS SYSTEM:  [x  ] Alert & Oriented X3, [ x ] Nonfocal  [  ] Confusion  [  ] Encephalopathic [  ] Sedated [  ] Unable to assess, [  ] Dementia [  ] Other-  EXTREMITIES: [  ] 2+ Peripheral Pulses, No clubbing, No cyanosis,  [  ] edema B/L lower EXT. [ x ] PVD stasis skin changes B/L Lower EXT, [  ] wound  LYMPH: No lymphadenopathy noted  SKIN:  [  ] No rashes or lesions, [  ] Pressure Ulcers, [  ] ecchymosis, [  ] Skin Tears, [ x ] Other-+ Left Lower Ext shin Anterior skin erythema + warm , On Calf small are of redness & induration  No Open wound. Small are of redness on left post Calf       DIET: Diet, NPO after Midnight:      NPO Start Date: 24-Aug-2023,   NPO Start Time: 23:59  Except Medications (08-24-23 @ 09:16)      LABS:                        9.9    10.64 )-----------( 263      ( 25 Aug 2023 06:02 )             30.7     25 Aug 2023 06:02    138    |  104    |  6      ----------------------------<  94     3.0     |  26     |  0.41     Ca    8.8        25 Aug 2023 06:02  Phos  2.3       25 Aug 2023 06:02  Mg     1.9       25 Aug 2023 06:02    TPro  6.7    /  Alb  2.5    /  TBili  0.6    /  DBili  x      /  AST  33     /  ALT  31     /  AlkPhos  129    25 Aug 2023 06:02    PT/INR - ( 25 Aug 2023 06:02 )   PT: 12.1 sec;   INR: 1.04 ratio         PTT - ( 25 Aug 2023 06:02 )  PTT:30.8 sec  Urinalysis Basic - ( 25 Aug 2023 06:02 )    Color: x / Appearance: x / SG: x / pH: x  Gluc: 94 mg/dL / Ketone: x  / Bili: x / Urobili: x   Blood: x / Protein: x / Nitrite: x   Leuk Esterase: x / RBC: x / WBC x   Sq Epi: x / Non Sq Epi: x / Bacteria: x        Culture Results:   50,000 - 99,000 CFU/mL Escherichia coli (08-23 @ 06:00)  Culture Results:   No growth at 48 Hours (08-22 @ 22:22)  Culture Results:   No growth at 48 Hours (08-22 @ 21:50)                  Culture - Urine (collected 23 Aug 2023 06:00)  Source: Clean Catch Clean Catch (Midstream)  Preliminary Report (24 Aug 2023 14:54):    50,000 - 99,000 CFU/mL Escherichia coli    Culture - Blood (collected 22 Aug 2023 22:22)  Source: .Blood Blood-Peripheral  Preliminary Report (25 Aug 2023 02:02):    No growth at 48 Hours    Culture - Blood (collected 22 Aug 2023 21:50)  Source: .Blood Blood-Peripheral  Preliminary Report (25 Aug 2023 02:02):    No growth at 48 Hours         Anemia Panel:      Thyroid Panel:        Lipase: 271 U/L (08-23-23 @ 08:06)  Amylase: 128 U/L (08-23-23 @ 08:06)  Lipase: 2483 U/L (08-22-23 @ 09:58)          RADIOLOGY & ADDITIONAL TESTS:  < from: MR MRCP w/wo IV Cont (08.23.23 @ 13:17) >  LIVER: Normal size. Main portal vein and hepatic veins are patent.  BILE DUCTS: No biliary distention. Mid/distal choledocholithiasis of the   CBD, images 25-29 series 4 and image 17 series 2. Stones are not seen on   all sequences, likely due to small size and artifact.  GALLBLADDER: Gallbladder distention, cholelithiasis, and prominent wall   edema, concerning for acute cholecystitis.  SPLEEN: Spleen size within normal limits  PANCREAS: Mild peripancreatic inflammatory changes, which may reflect   acute pancreatitis. Cystic focus of the pancreatic body measuring up to 8   mm, image 23 series 3 may represent IPMN or sequelae of pancreatitis.  ADRENALS: Unremarkable  KIDNEYS/URETERS: No hydronephrosis. Renal cysts, few which are   proteinaceous/hemorrhagic.    VISUALIZED PORTIONS:  BOWEL: Moderate hiatal hernia. No bowel distention.  PERITONEUM: Trace fluid  VESSELS: No abdominal aortic aneurysm  RETROPERITONEUM/LYMPH NODES: Small volume nodes  ABDOMINAL WALL: Unremarkable  BONES: Scoliotic spinal curvature and degenerative changes, suboptimally   characterized on abdominal protocol MRI.    IMPRESSION:    Motion limited evaluation.    Acute cholecystitis. Mild pancreatitis. Cystic focus of the pancreatic   body measuring up to 8 mm, image 23 series 3 may represent IPMN or   sequelae of pancreatitis.      < end of copied text >      HEALTH ISSUES - PROBLEM Dx:  Abdominal pain    Need for prophylactic measure    Pancreatitis    Abnormal finding on CT scan    Cholecystitis    Left leg cellulitis    Anxiety and depression            Consultant(s) Notes Reviewed:  [ x ] YES     Care Discussed with [X] Consultants  [ x ] Patient  [x  ] Family [  ] HCP [  ]   [  ] Social Service  [ x ] RN, [  ] Physical Therapy,[  ] Palliative care team  DVT PPX: [  ] Lovenox, [  ] S C Heparin, [  ] Coumadin, [  ] Xarelto, [  ] Eliquis, [  ] Pradaxa, [  ] IV Heparin drip, [ x ] SCD [  ] Contraindication 2 to GI Bleed,[  ] Ambulation [  ] Contraindicated 2 to  bleed [  ] Contraindicated 2 to Brain Bleed  Advanced directive: [x  ] None, [  ] DNR/DNI

## 2023-08-25 NOTE — DIETITIAN INITIAL EVALUATION ADULT - ORAL INTAKE PTA/DIET HISTORY
patient seen in room awaiting surgery patient is NPO. states was taking clear liquids but with some intolerance. patient reports eats no spicy foods, no ice cream no whipped cream no clams family history crohns/UC discussed. patient states eats coffee bagel for breakfast then at 4PM salad and chicken or food from restaurant. RAYNE diet followed as well.  lives with . states poor PO intake since May knee surgery with loss of appetite.   no other food allergies noted

## 2023-08-25 NOTE — DIETITIAN INITIAL EVALUATION ADULT - PROBLEM SELECTOR PLAN 1
- On admission patient with abdominal pain, n/v,/ d + lipase 3x the upper limit of normal 2483 amberly setting of acute pancreatitis.-Likely Gall stone Pancreatitis   - AST 44 ALK po4 146    - CT A/P : Mild, acute interstitial edematous pancreatitis in the pancreatic tail. .  - RUQ U/S Echogenicity in the region of the gallbladder neck suggests small stones versus gravel. Gallbladder wall thickening identified measuring 7-8 mm. Trace pericholecystic fluid. Correlate for cholecystitis.  - IVF NS @100 cc/h  -  Tylenol 650 mg q4h po prn for mild pain | hydromorphone 0.5 mg q6h IV prn for moderate pain | hydromorphone 1 mg q4h IV prn for severe pain  - Zofran 4mg IV q8h prn for n/v  - EKG QTc 462 check EKG every 48 hours for prolongation  - f/up lipid panel   - monitor for s/s of infection and trend VS and WBC in AM CBC  - monitor for s/s of worsening of pancreatitis and trend lipase and AM CMP,Lipase   - GI consulted, will appreciate recs.-NPO ,IVF

## 2023-08-25 NOTE — DIETITIAN INITIAL EVALUATION ADULT - NSICDXPASTSURGICALHX_GEN_ALL_CORE_FT
PAST SURGICAL HISTORY:  S/P cataract surgery bilateral    S/P foot surgery, right right great toe surgery    S/P tonsillectomy     S/P total knee replacement, left     S/P wrist surgery right

## 2023-08-25 NOTE — DIETITIAN INITIAL EVALUATION ADULT - NS FNS DIET ORDER
Diet, NPO after Midnight:      NPO Start Date: 24-Aug-2023,   NPO Start Time: 23:59  Except Medications (08-24-23 @ 09:16)

## 2023-08-25 NOTE — PROGRESS NOTE ADULT - ATTENDING COMMENTS
84 y/o F w/ PMHx of HLD, osteoarthritis, GERD, depression/anxiety, varicose veins ,Left  TKR  presents w/ sudden onset 10/10 upper abdominal pain since Sunday evening, pain radiation to bilateral lower back & bilateral shoulders. CT A/P : Mild, acute interstitial edematous pancreatitis in the pancreatic tail. CXR : Intrathoracic gastric fundal and body herniation ..RUQ U/S Echogenicity in the region of the gallbladder neck suggests small stones versus gravel. Gallbladder wall thickening identified measuring 7-8 mm. Trace pericholecystic fluid. Correlate for cholecystitis. Admitted for Gall stone  edematous pancreatitis & AC  Cholecystitis, Cholelithiasis  and Intrathoracic gastric fundal and body herniation  Pt seen, Examined, Case & care plan d/w pt, residents at detail.  AM labs   -Pt is Medically Optimized for schedule ERCP in AM   Pt is medically optimized for schedule Cholecystomy tomorrow  -Pre Op & Alanna Op IV Abx as per Sx & ID  -Post op DVT PPX as per Surgeon  Post Op Incentive spirometry    -On Clear Liquid diet   NPO, after midnight   IV Abx ,IVF   Consults:  GI- Dr Mandujano D/W -Plan for ERCP in AM with Lap Shama   ID-DR AMBER Kwok  -IV Vanco -LL Ext Cellulitis, IV Meropenem for AC Shama   D/W Pt & family at Detail.  Total care time is 60 minutes..

## 2023-08-25 NOTE — DIETITIAN INITIAL EVALUATION ADULT - PROBLEM SELECTOR PLAN 4
- Patient presents with mild erythema LL Ext ,+ itching and tender to touch  likely 2/2 LLE cellulitis  - Given 1 x dose IV zosyn , 2L NS Bolus in the ED  - Continue IV Zosyn q8hrs  - Tylenol 650 mg PO q6h PRN for mild pain or fever  - f/u Bcx and Ucx, MRSA PCR   - Monitor WBC   - ID Dr. Lemus consulted, reccs willie

## 2023-08-25 NOTE — PROGRESS NOTE ADULT - SUBJECTIVE AND OBJECTIVE BOX
pt seen  c/o epigastric pain and diarreha  ICU Vital Signs Last 24 Hrs  T(C): 37.2 (25 Aug 2023 05:00), Max: 37.2 (25 Aug 2023 05:00)  T(F): 99 (25 Aug 2023 05:00), Max: 99 (25 Aug 2023 05:00)  HR: 95 (25 Aug 2023 05:00) (90 - 95)  BP: 151/78 (25 Aug 2023 05:00) (124/79 - 151/78)  BP(mean): --  ABP: --  ABP(mean): --  RR: 18 (25 Aug 2023 05:00) (18 - 18)  SpO2: 91% (25 Aug 2023 05:00) (91% - 93%)    O2 Parameters below as of 25 Aug 2023 05:00  Patient On (Oxygen Delivery Method): room air          gen-NAD  soft  mild upper abdominal pain                          9.9    10.64 )-----------( 263      ( 25 Aug 2023 06:02 )             30.7

## 2023-08-25 NOTE — PROGRESS NOTE ADULT - PROBLEM SELECTOR PLAN 5
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 Rubio)- NEG DVT - No Vascular intervention   - No acute intervention at this time continue home Lexapro and trileptal ( for depression)   follow psych Dr MEJIA  out patient - Patient presents with mild erythema LL Ext ,+ itching and tender to touch  likely 2/2 LLE cellulitis  - Given 1 x dose IV zosyn , 2L NS Bolus in the ED  - Switched back to IV zosyn  - IV Vancomycin started on 8/24 because of lack of improvement  - Bcx and Ucx, NGTD  - MRSA PCR negative   - Lower extremity doppler us negative  - WBC downtrending  - ID Dr. AMBER Kwok d/w Restart POST op Vanco

## 2023-08-25 NOTE — PROGRESS NOTE ADULT - ASSESSMENT
82 y/o F w/ PMHx of HLD, osteoarthritis, GERD, depression/anxiety, varicose veins ,Left  TKR  presents w/ sudden onset 10/10 upper abdominal pain since Sunday evening, pain radiation to bilateral lower back & bilateral shoulders. CT A/P : Mild, acute interstitial edematous pancreatitis in the pancreatic tail. CXR : Intrathoracic gastric fundal and body herniation ..RUQ U/S Echogenicity in the region of the gallbladder neck suggests small stones versus gravel. Gallbladder wall thickening identified measuring 7-8 mm. Trace pericholecystic fluid. Correlate for cholecystitis. Admitted for Gall stone  edematous pancreatitis & AC  Cholecystitis, Cholelithiasis  and Intrathoracic gastric fundal and body herniation    Acute Abdominal Pain 2/2 Gallstone Pancreatitis/Cholecystitis  LLE Cellulitis  - CT A/P : Mild, acute interstitial edematous pancreatitis in the pancreatic tail. .  - RUQ U/S Echogenicity in the region of the gallbladder neck suggests small stones versus gravel. Gallbladder wall thickening identified measuring 7-8 mm. Trace pericholecystic fluid. Correlate for cholecystitis.  - elevated LFTs, lipase  - MRCP Acute cholecystitis. Mild pancreatitis. Cystic focus of the pancreatic body measuring up to 8 mm, image 23 series 3 may represent IPMN or sequelae of pancreatitis.  - BCx NGTD  - GI PCR + EPEC  - UCx. E.coli    Recommendations:  Can de-escalate to zosyn again  Adding azithromycin 1000mg x1 dose for EPEC  WIll d/c vanc/meropenem    Trend temps/WBC  Trend LFTs/lipase  Serial abdominal exams  Appreciate GI recs  Appreciate surgery recs    Dr. Felix covering weekend service  Infectious Diseases will continue to follow. Please call with any questions.   Ne Kwok M.D.  \[facility]\"" Division of Infectious Diseases [phone]   82 y/o F w/ PMHx of HLD, osteoarthritis, GERD, depression/anxiety, varicose veins ,Left  TKR  presents w/ sudden onset 10/10 upper abdominal pain since Sunday evening, pain radiation to bilateral lower back & bilateral shoulders. CT A/P : Mild, acute interstitial edematous pancreatitis in the pancreatic tail. CXR : Intrathoracic gastric fundal and body herniation ..RUQ U/S Echogenicity in the region of the gallbladder neck suggests small stones versus gravel. Gallbladder wall thickening identified measuring 7-8 mm. Trace pericholecystic fluid. Correlate for cholecystitis. Admitted for Gall stone  edematous pancreatitis & AC  Cholecystitis, Cholelithiasis  and Intrathoracic gastric fundal and body herniation    Acute Abdominal Pain 2/2 Gallstone Pancreatitis/Cholecystitis  LLE Cellulitis  - CT A/P : Mild, acute interstitial edematous pancreatitis in the pancreatic tail. .  - RUQ U/S Echogenicity in the region of the gallbladder neck suggests small stones versus gravel. Gallbladder wall thickening identified measuring 7-8 mm. Trace pericholecystic fluid. Correlate for cholecystitis.  - elevated LFTs, lipase  - MRCP Acute cholecystitis. Mild pancreatitis. Cystic focus of the pancreatic body measuring up to 8 mm, image 23 series 3 may represent IPMN or sequelae of pancreatitis.  - BCx NGTD  - GI PCR + EPEC  - UCx. E.coli    Recommendations:  Can de-escalate to zosyn again  C/w vancomycin  Adding azithromycin 1000mg x1 dose for EPEC    Trend temps/WBC  Trend LFTs/lipase  Serial abdominal exams  Appreciate GI recs  Appreciate surgery recs    D/w Dr. Sundar Felix covering weekend service  Infectious Diseases will continue to follow. Please call with any questions.   Ne Kwok M.D.  [facility] Division of Infectious Diseases [phone]

## 2023-08-26 DIAGNOSIS — D64.9 ANEMIA, UNSPECIFIED: ICD-10-CM

## 2023-08-26 LAB
ALBUMIN SERPL ELPH-MCNC: 2.4 G/DL — LOW (ref 3.3–5)
ALP SERPL-CCNC: 145 U/L — HIGH (ref 40–120)
ALT FLD-CCNC: 52 U/L — SIGNIFICANT CHANGE UP (ref 12–78)
ANION GAP SERPL CALC-SCNC: 7 MMOL/L — SIGNIFICANT CHANGE UP (ref 5–17)
AST SERPL-CCNC: 75 U/L — HIGH (ref 15–37)
BASOPHILS # BLD AUTO: 0.01 K/UL — SIGNIFICANT CHANGE UP (ref 0–0.2)
BASOPHILS NFR BLD AUTO: 0.1 % — SIGNIFICANT CHANGE UP (ref 0–2)
BILIRUB SERPL-MCNC: 0.4 MG/DL — SIGNIFICANT CHANGE UP (ref 0.2–1.2)
BUN SERPL-MCNC: 9 MG/DL — SIGNIFICANT CHANGE UP (ref 7–23)
CALCIUM SERPL-MCNC: 8.7 MG/DL — SIGNIFICANT CHANGE UP (ref 8.5–10.1)
CHLORIDE SERPL-SCNC: 106 MMOL/L — SIGNIFICANT CHANGE UP (ref 96–108)
CO2 SERPL-SCNC: 27 MMOL/L — SIGNIFICANT CHANGE UP (ref 22–31)
CREAT SERPL-MCNC: 0.43 MG/DL — LOW (ref 0.5–1.3)
EGFR: 96 ML/MIN/1.73M2 — SIGNIFICANT CHANGE UP
EOSINOPHIL # BLD AUTO: 0 K/UL — SIGNIFICANT CHANGE UP (ref 0–0.5)
EOSINOPHIL NFR BLD AUTO: 0 % — SIGNIFICANT CHANGE UP (ref 0–6)
GLUCOSE SERPL-MCNC: 111 MG/DL — HIGH (ref 70–99)
HCT VFR BLD CALC: 29.9 % — LOW (ref 34.5–45)
HGB BLD-MCNC: 9.5 G/DL — LOW (ref 11.5–15.5)
IMM GRANULOCYTES NFR BLD AUTO: 0.6 % — SIGNIFICANT CHANGE UP (ref 0–0.9)
LYMPHOCYTES # BLD AUTO: 0.58 K/UL — LOW (ref 1–3.3)
LYMPHOCYTES # BLD AUTO: 7.1 % — LOW (ref 13–44)
MAGNESIUM SERPL-MCNC: 2.1 MG/DL — SIGNIFICANT CHANGE UP (ref 1.6–2.6)
MCHC RBC-ENTMCNC: 25.8 PG — LOW (ref 27–34)
MCHC RBC-ENTMCNC: 31.8 GM/DL — LOW (ref 32–36)
MCV RBC AUTO: 81.3 FL — SIGNIFICANT CHANGE UP (ref 80–100)
MONOCYTES # BLD AUTO: 0.75 K/UL — SIGNIFICANT CHANGE UP (ref 0–0.9)
MONOCYTES NFR BLD AUTO: 9.2 % — SIGNIFICANT CHANGE UP (ref 2–14)
NEUTROPHILS # BLD AUTO: 6.74 K/UL — SIGNIFICANT CHANGE UP (ref 1.8–7.4)
NEUTROPHILS NFR BLD AUTO: 83 % — HIGH (ref 43–77)
NRBC # BLD: 0 /100 WBCS — SIGNIFICANT CHANGE UP (ref 0–0)
PHOSPHATE SERPL-MCNC: 3.2 MG/DL — SIGNIFICANT CHANGE UP (ref 2.5–4.5)
PLATELET # BLD AUTO: 266 K/UL — SIGNIFICANT CHANGE UP (ref 150–400)
POTASSIUM SERPL-MCNC: 4.2 MMOL/L — SIGNIFICANT CHANGE UP (ref 3.5–5.3)
POTASSIUM SERPL-SCNC: 4.2 MMOL/L — SIGNIFICANT CHANGE UP (ref 3.5–5.3)
PROT SERPL-MCNC: 6.5 G/DL — SIGNIFICANT CHANGE UP (ref 6–8.3)
RBC # BLD: 3.68 M/UL — LOW (ref 3.8–5.2)
RBC # FLD: 17.8 % — HIGH (ref 10.3–14.5)
SODIUM SERPL-SCNC: 140 MMOL/L — SIGNIFICANT CHANGE UP (ref 135–145)
WBC # BLD: 8.13 K/UL — SIGNIFICANT CHANGE UP (ref 3.8–10.5)
WBC # FLD AUTO: 8.13 K/UL — SIGNIFICANT CHANGE UP (ref 3.8–10.5)

## 2023-08-26 PROCEDURE — 93971 EXTREMITY STUDY: CPT | Mod: 26,LT

## 2023-08-26 RX ORDER — ASPIRIN/CALCIUM CARB/MAGNESIUM 324 MG
81 TABLET ORAL DAILY
Refills: 0 | Status: DISCONTINUED | OUTPATIENT
Start: 2023-08-26 | End: 2023-08-27

## 2023-08-26 RX ORDER — ENOXAPARIN SODIUM 100 MG/ML
40 INJECTION SUBCUTANEOUS EVERY 24 HOURS
Refills: 0 | Status: DISCONTINUED | OUTPATIENT
Start: 2023-08-26 | End: 2023-08-27

## 2023-08-26 RX ADMIN — Medication 1 APPLICATION(S): at 04:44

## 2023-08-26 RX ADMIN — ESCITALOPRAM OXALATE 20 MILLIGRAM(S): 10 TABLET, FILM COATED ORAL at 12:09

## 2023-08-26 RX ADMIN — ENOXAPARIN SODIUM 40 MILLIGRAM(S): 100 INJECTION SUBCUTANEOUS at 07:46

## 2023-08-26 RX ADMIN — Medication 1 TABLET(S): at 12:09

## 2023-08-26 RX ADMIN — Medication 250 MILLIGRAM(S): at 12:09

## 2023-08-26 RX ADMIN — Medication 81 MILLIGRAM(S): at 12:09

## 2023-08-26 RX ADMIN — Medication 3 MILLIGRAM(S): at 22:46

## 2023-08-26 RX ADMIN — ATORVASTATIN CALCIUM 80 MILLIGRAM(S): 80 TABLET, FILM COATED ORAL at 22:46

## 2023-08-26 RX ADMIN — Medication 1000 UNIT(S): at 12:09

## 2023-08-26 RX ADMIN — MORPHINE SULFATE 1 MILLIGRAM(S): 50 CAPSULE, EXTENDED RELEASE ORAL at 04:56

## 2023-08-26 RX ADMIN — PIPERACILLIN AND TAZOBACTAM 25 GRAM(S): 4; .5 INJECTION, POWDER, LYOPHILIZED, FOR SOLUTION INTRAVENOUS at 07:45

## 2023-08-26 RX ADMIN — MORPHINE SULFATE 1 MILLIGRAM(S): 50 CAPSULE, EXTENDED RELEASE ORAL at 05:56

## 2023-08-26 RX ADMIN — Medication 1 APPLICATION(S): at 18:29

## 2023-08-26 RX ADMIN — OXCARBAZEPINE 300 MILLIGRAM(S): 300 TABLET, FILM COATED ORAL at 18:29

## 2023-08-26 RX ADMIN — MORPHINE SULFATE 1 MILLIGRAM(S): 50 CAPSULE, EXTENDED RELEASE ORAL at 15:59

## 2023-08-26 RX ADMIN — OXCARBAZEPINE 300 MILLIGRAM(S): 300 TABLET, FILM COATED ORAL at 04:44

## 2023-08-26 RX ADMIN — PANTOPRAZOLE SODIUM 40 MILLIGRAM(S): 20 TABLET, DELAYED RELEASE ORAL at 04:45

## 2023-08-26 RX ADMIN — PIPERACILLIN AND TAZOBACTAM 25 GRAM(S): 4; .5 INJECTION, POWDER, LYOPHILIZED, FOR SOLUTION INTRAVENOUS at 16:00

## 2023-08-26 NOTE — PROGRESS NOTE ADULT - SUBJECTIVE AND OBJECTIVE BOX
POD#1 s/p robotic assisted laparoscopic cholecystectomy, ERCP    S: Patient seen and examined at bedside. overnight with abdominal pain, requiring pain medication. Patient reports no new complaints at this time.  Admits to diarrhea. Patient has not ambulated this morning as of yet. Voiding and tolerating diet. Patient denies any fever, chills, chest pain, shortness of breath, nausea, vomiting, or urinary complaints.    MEDICATIONS:  acetaminophen     Tablet .. 650 milliGRAM(s) Oral every 6 hours PRN  ammonium lactate 12% Lotion 1 Application(s) Topical two times a day  aspirin enteric coated 81 milliGRAM(s) Oral daily  atorvastatin 80 milliGRAM(s) Oral at bedtime  cholecalciferol 1000 Unit(s) Oral daily  enoxaparin Injectable 40 milliGRAM(s) SubCutaneous every 24 hours  escitalopram 20 milliGRAM(s) Oral daily  lactobacillus acidophilus 1 Tablet(s) Oral daily  melatonin 3 milliGRAM(s) Oral at bedtime PRN  morphine  - Injectable 0.5 milliGRAM(s) IV Push every 4 hours PRN  morphine  - Injectable 1 milliGRAM(s) IV Push every 4 hours PRN  morphine  - Injectable 2 milliGRAM(s) IV Push every 4 hours PRN  ondansetron Injectable 4 milliGRAM(s) IV Push every 8 hours PRN  OXcarbazepine 300 milliGRAM(s) Oral two times a day  pantoprazole    Tablet 40 milliGRAM(s) Oral before breakfast  piperacillin/tazobactam IVPB.. 3.375 Gram(s) IV Intermittent every 8 hours  vancomycin  IVPB 750 milliGRAM(s) IV Intermittent every 12 hours      O:  Vital Signs Last 24 Hrs  T(C): 36.3 (26 Aug 2023 05:11), Max: 36.9 (25 Aug 2023 13:34)  T(F): 97.4 (26 Aug 2023 05:11), Max: 98.4 (25 Aug 2023 13:34)  HR: 78 (26 Aug 2023 05:11) (73 - 92)  BP: 120/72 (26 Aug 2023 05:11) (98/49 - 122/87)  RR: 18 (26 Aug 2023 05:11) (16 - 18)  SpO2: 91% (26 Aug 2023 05:11) (91% - 100%)    Parameters below as of 26 Aug 2023 05:11  Patient On (Oxygen Delivery Method): room air    PHYSICAL EXAM:  GENERAL: No acute distress, lying comfortably in bed  HEAD:  Atraumatic, Normocephalic  CHEST/LUNG: Non labored respirations, no accessory muscle use  HEART: Regular rate and rhythm  ABDOMEN: Soft, non-tender, non-distended, ttp at the RUQ and LUQ  EXT: SCDs in place, LLE with minimal erythema to the anterior calf, posterior aspect of calf as well LLE with erythema, tenderness to deep palpation, indurated  NEUROLOGY: A&O x 3, no focal deficits    LABS:                        9.5    8.13  )-----------( 266      ( 26 Aug 2023 06:20 )             29.9     08-26    140  |  106  |  9   ----------------------------<  111<H>  4.2   |  27  |  0.43<L>    Ca    8.7      26 Aug 2023 06:20  Phos  3.2     08-26  Mg     2.1     08-26    TPro  6.5  /  Alb  2.4<L>  /  TBili  0.4  /  DBili  x   /  AST  75<H>  /  ALT  52  /  AlkPhos  145<H>  08-26    PT/INR - ( 25 Aug 2023 06:02 )   PT: 12.1 sec;   INR: 1.04 ratio       PTT - ( 25 Aug 2023 06:02 )  PTT:30.8 sec    RADIOLOGY:  < from: US Duplex Venous Lower Ext Complete, Bilateral (08.23.23 @ 10:35) >  FINDINGS:    RIGHT:  Normal compressibility of the RIGHT common femoral, femoral and popliteal   veins.  Doppler examination shows normal spontaneous and phasic flow.  No RIGHT calf vein thrombosis is detected.    LEFT:  Normal compressibility of the LEFT common femoral, femoral and popliteal   veins.  Doppler examination shows normal spontaneous and phasic flow.  No LEFT calf vein thrombosis is detected.    IMPRESSION:  No evidence of deep venous thrombosis in either lower extremity.      < end of copied text >    ASSESSMENT: 84y/o Female POD#1 s/p robotic assisted laparoscopic cholecystectomy with ERCP for gallstone pancreatitis.     PLAN:  - VSSAF  - AM labs   - Continue diet  - Continue antibiotics  - Pain control as needed  - Encourage OOB/ambulation & incentive spirometry  - Monitor bowel function/serial abdominal exams  - Monitor drain output/monitor ostomy output & care**  - DVT ppx with     To be discussed with      Surgical Team Spectralink: [id number]   POD#1 s/p robotic assisted laparoscopic cholecystectomy, ERCP    S: Patient seen and examined at bedside. overnight with abdominal pain, requiring pain medication. Patient reports no new complaints at this time.  Admits to diarrhea. Patient has not ambulated this morning as of yet. Voiding and tolerating diet. Patient denies any fever, chills, chest pain, shortness of breath, nausea, vomiting, or urinary complaints.    MEDICATIONS:  acetaminophen     Tablet .. 650 milliGRAM(s) Oral every 6 hours PRN  ammonium lactate 12% Lotion 1 Application(s) Topical two times a day  aspirin enteric coated 81 milliGRAM(s) Oral daily  atorvastatin 80 milliGRAM(s) Oral at bedtime  cholecalciferol 1000 Unit(s) Oral daily  enoxaparin Injectable 40 milliGRAM(s) SubCutaneous every 24 hours  escitalopram 20 milliGRAM(s) Oral daily  lactobacillus acidophilus 1 Tablet(s) Oral daily  melatonin 3 milliGRAM(s) Oral at bedtime PRN  morphine  - Injectable 0.5 milliGRAM(s) IV Push every 4 hours PRN  morphine  - Injectable 1 milliGRAM(s) IV Push every 4 hours PRN  morphine  - Injectable 2 milliGRAM(s) IV Push every 4 hours PRN  ondansetron Injectable 4 milliGRAM(s) IV Push every 8 hours PRN  OXcarbazepine 300 milliGRAM(s) Oral two times a day  pantoprazole    Tablet 40 milliGRAM(s) Oral before breakfast  piperacillin/tazobactam IVPB.. 3.375 Gram(s) IV Intermittent every 8 hours  vancomycin  IVPB 750 milliGRAM(s) IV Intermittent every 12 hours      O:  Vital Signs Last 24 Hrs  T(C): 36.3 (26 Aug 2023 05:11), Max: 36.9 (25 Aug 2023 13:34)  T(F): 97.4 (26 Aug 2023 05:11), Max: 98.4 (25 Aug 2023 13:34)  HR: 78 (26 Aug 2023 05:11) (73 - 92)  BP: 120/72 (26 Aug 2023 05:11) (98/49 - 122/87)  RR: 18 (26 Aug 2023 05:11) (16 - 18)  SpO2: 91% (26 Aug 2023 05:11) (91% - 100%)    Parameters below as of 26 Aug 2023 05:11  Patient On (Oxygen Delivery Method): room air    PHYSICAL EXAM:  GENERAL: No acute distress, lying comfortably in bed  HEAD:  Atraumatic, Normocephalic  CHEST/LUNG: Non labored respirations, no accessory muscle use  HEART: Regular rate and rhythm  ABDOMEN: Soft, non-tender, non-distended, ttp at the RUQ and LUQ  EXT: SCDs in place, LLE with minimal erythema to the anterior calf, posterior aspect of calf as well LLE with erythema, tenderness to deep palpation, indurated  NEUROLOGY: A&O x 3, no focal deficits    LABS:                        9.5    8.13  )-----------( 266      ( 26 Aug 2023 06:20 )             29.9     08-26    140  |  106  |  9   ----------------------------<  111<H>  4.2   |  27  |  0.43<L>    Ca    8.7      26 Aug 2023 06:20  Phos  3.2     08-26  Mg     2.1     08-26    TPro  6.5  /  Alb  2.4<L>  /  TBili  0.4  /  DBili  x   /  AST  75<H>  /  ALT  52  /  AlkPhos  145<H>  08-26    PT/INR - ( 25 Aug 2023 06:02 )   PT: 12.1 sec;   INR: 1.04 ratio       PTT - ( 25 Aug 2023 06:02 )  PTT:30.8 sec    RADIOLOGY:  < from: US Duplex Venous Lower Ext Complete, Bilateral (08.23.23 @ 10:35) >  FINDINGS:    RIGHT:  Normal compressibility of the RIGHT common femoral, femoral and popliteal   veins.  Doppler examination shows normal spontaneous and phasic flow.  No RIGHT calf vein thrombosis is detected.    LEFT:  Normal compressibility of the LEFT common femoral, femoral and popliteal   veins.  Doppler examination shows normal spontaneous and phasic flow.  No LEFT calf vein thrombosis is detected.    IMPRESSION:  No evidence of deep venous thrombosis in either lower extremity.      < end of copied text >    ASSESSMENT: 84y/o Female POD#1 s/p robotic assisted laparoscopic cholecystectomy with ERCP for gallstone pancreatitis. VSSAF, exam with continued abdominal pain consistent with post-operative state, LLE calf pain and induration and erythema of the posterior aspect. Labs with improved WC today at 8k, LFTs slightly increased from yesterday.    PLAN:  - VSSAF, exam reassuring, labs improved WC today at 8k, LFTs slightly increased from yesterday  - Advance to regular diet  - Continue care per primary team  - Duplex of the LLE to r/o DVT  - Encourage OOB/ambulation & incentive spirometry  - DVT ppx     Discussed with Dr. Dawn    Surgical Team Spectralink: [id number]

## 2023-08-26 NOTE — PROGRESS NOTE ADULT - SUBJECTIVE AND OBJECTIVE BOX
Covering [facility] DIVISION of INFECTIOUS DISEASE  DIALLO Stevens, JANETTE Leon G. Casimir BERG HOMAR is a 83yFemale , patient examined and chart reviewed.       INTERVAL HPI/ OVERNIGHT EVENTS:   No events. s/p robotic assisted laparoscopic cholecystectomy, ERCP POD#1    PAST MEDICAL & SURGICAL HISTORY:  Anxiety  Depression  Hyperlipidemia  GERD (gastroesophageal reflux disease)  History of vascular disease  venous; has swelling  Osteoarthritis of left knee  HLD (hyperlipidemia)  S/P foot surgery, right  right great toe surgery  S/P wrist surgery  right  S/P tonsillectomy  S/P cataract surgery  bilateral  S/P total knee replacement, left      For details regarding the patient's social history, family history, and other miscellaneous elements, please refer the initial infectious diseases consultation and/or the admitting history and physical examination for this admission.      ROS:  CONSTITUTIONAL:  Negative fever or chill  EYES:  Negative  blurry vision or double vision  CARDIOVASCULAR:  Negative for chest pain or palpitations  RESPIRATORY:  Negative for cough, wheezing, or SOB   GASTROINTESTINAL:  Negative for nausea, vomiting, diarrhea, constipation, or abdominal pain  GENITOURINARY:  Negative frequency, urgency or dysuria  NEUROLOGIC:  No headache, confusion, dizziness, lightheadedness  All other systems were reviewed and are negative     ALLERGIES  lactose (Diarrhea)  adhesives (Pruritus; Rash)  No Known Drug Allergies      Current inpatient medications :    ANTIBIOTICS/RELEVANT:  lactobacillus acidophilus 1 Tablet(s) Oral daily  piperacillin/tazobactam IVPB.. 3.375 Gram(s) IV Intermittent every 8 hours  vancomycin  IVPB 750 milliGRAM(s) IV Intermittent every 12 hours      acetaminophen     Tablet .. 650 milliGRAM(s) Oral every 6 hours PRN  ammonium lactate 12% Lotion 1 Application(s) Topical two times a day  aspirin enteric coated 81 milliGRAM(s) Oral daily  atorvastatin 80 milliGRAM(s) Oral at bedtime  cholecalciferol 1000 Unit(s) Oral daily  enoxaparin Injectable 40 milliGRAM(s) SubCutaneous every 24 hours  escitalopram 20 milliGRAM(s) Oral daily  melatonin 3 milliGRAM(s) Oral at bedtime PRN  morphine  - Injectable 0.5 milliGRAM(s) IV Push every 4 hours PRN  morphine  - Injectable 1 milliGRAM(s) IV Push every 4 hours PRN  morphine  - Injectable 2 milliGRAM(s) IV Push every 4 hours PRN  ondansetron Injectable 4 milliGRAM(s) IV Push every 8 hours PRN  OXcarbazepine 300 milliGRAM(s) Oral two times a day  pantoprazole    Tablet 40 milliGRAM(s) Oral before breakfast      Objective:    08-26 @ 07:01  -  08-26 @ 16:04  --------------------------------------------------------  IN: 350 mL / OUT: 0 mL / NET: 350 mL      T(C): 36.6 (08-26-23 @ 12:47), Max: 36.6 (08-25-23 @ 17:32)  HR: 74 (08-26-23 @ 12:47) (73 - 81)  BP: 113/73 (08-26-23 @ 12:47) (98/49 - 120/72)  RR: 18 (08-26-23 @ 12:47) (16 - 18)  SpO2: 94% (08-26-23 @ 12:47) (91% - 100%)      Physical Exam:  General: no acute distress  Neck: supple, trachea midline  Lungs: clear, no wheeze/rhonchi  Cardiovascular: regular rate and rhythm, S1 S2  Abdomen: soft, nontender,  bowel sounds normal  Neurological: alert and oriented x3  Skin: no rash  Extremities: Left LE decreased swelling erythema        LABS:                          9.5    8.13  )-----------( 266      ( 26 Aug 2023 06:20 )             29.9       08-26    140  |  106  |  9   ----------------------------<  111<H>  4.2   |  27  |  0.43<L>    Ca    8.7      26 Aug 2023 06:20  Phos  3.2     08-26  Mg     2.1     08-26    TPro  6.5  /  Alb  2.4<L>  /  TBili  0.4  /  DBili  x   /  AST  75<H>  /  ALT  52  /  AlkPhos  145<H>  08-26      PT/INR - ( 25 Aug 2023 06:02 )   PT: 12.1 sec;   INR: 1.04 ratio         PTT - ( 25 Aug 2023 06:02 )  PTT:30.8 sec  Urinalysis Basic - ( 26 Aug 2023 06:20 )    Color: x / Appearance: x / SG: x / pH: x  Gluc: 111 mg/dL / Ketone: x  / Bili: x / Urobili: x   Blood: x / Protein: x / Nitrite: x   Leuk Esterase: x / RBC: x / WBC x   Sq Epi: x / Non Sq Epi: x / Bacteria: x      Vancomycin Level, Trough: 5.2 ug/mL (08-25 @ 21:13)      MICROBIOLOGY:                  RADIOLOGY & ADDITIONAL STUDIES:          Assessment :  84 y/o F w/ PMHx of HLD, osteoarthritis, GERD, depression/anxiety, varicose veins ,Left  TKR  presents w/ sudden onset 10/10 upper abdominal pain since Sunday evening, pain radiation to bilateral lower back & bilateral shoulders. CT A/P : Mild, acute interstitial edematous pancreatitis in the pancreatic tail. CXR : Intrathoracic gastric fundal and body herniation ..RUQ U/S Echogenicity in the region of the gallbladder neck suggests small stones versus gravel. Gallbladder wall thickening identified measuring 7-8 mm. Trace pericholecystic fluid. Correlate for cholecystitis. Admitted for Gall stone  edematous pancreatitis & AC  Cholecystitis, Cholelithiasis  and Intrathoracic gastric fundal and body herniation    Acute Abdominal Pain 2/2 Gallstone Pancreatitis/Cholecystitis  LLE Cellulitis  - CT A/P : Mild, acute interstitial edematous pancreatitis in the pancreatic tail. .  - RUQ U/S Echogenicity in the region of the gallbladder neck suggests small stones versus gravel. Gallbladder wall thickening identified measuring 7-8 mm. Trace pericholecystic fluid. Correlate for cholecystitis.  - elevated LFTs, lipase  - MRCP Acute cholecystitis. Mild pancreatitis. Cystic focus of the pancreatic body measuring up to 8 mm, image 23 series 3 may represent IPMN or sequelae of pancreatitis.  - BCx NGTD  - GI PCR + EPEC  - UCx. E.coli  - POD#1 s/p robotic assisted laparoscopic cholecystectomy, ERCP  - wbc wnl    Plan:  C/w vancomycin zosyn   Sp azithromycin 1000mg x1 dose for EPEC  Trend temps/WBC  Trend LFTs/lipase  Serial abdominal exams  stable from ID standpoint    Continue with present regiment.  Appropriate use of antibiotics and adverse effects reviewed.      I have discussed the above plan of care with patient in detail. She expressed understanding of the  treatment plan . Risks, benefits and alternatives discussed in detail. I have asked if she has any questions or concerns and appropriately addressed them to the best of my ability .    > 35 minutes were spent in direct patient care reviewing notes, medications ,labs data/ imaging , discussion with multidisciplinary team.    Thank you for allowing me to participate in care of your patient .    Cora Felix MD  Infectious Disease  [phone]

## 2023-08-26 NOTE — PROGRESS NOTE ADULT - SUBJECTIVE AND OBJECTIVE BOX
[facility]  Daren Quach PA-C  [address] [place] [address]  [phone]      INTERVAL HPI/OVERNIGHT EVENTS:  Pt s/e   s/p ercp s/p stone removal; large hiatal hernia   states pain is resolving         MEDICATIONS  (STANDING):  ammonium lactate 12% Lotion 1 Application(s) Topical two times a day  aspirin enteric coated 81 milliGRAM(s) Oral daily  atorvastatin 80 milliGRAM(s) Oral at bedtime  cholecalciferol 1000 Unit(s) Oral daily  enoxaparin Injectable 40 milliGRAM(s) SubCutaneous every 24 hours  escitalopram 20 milliGRAM(s) Oral daily  lactobacillus acidophilus 1 Tablet(s) Oral daily  OXcarbazepine 300 milliGRAM(s) Oral two times a day  pantoprazole    Tablet 40 milliGRAM(s) Oral before breakfast  piperacillin/tazobactam IVPB.. 3.375 Gram(s) IV Intermittent every 8 hours  vancomycin  IVPB 750 milliGRAM(s) IV Intermittent every 12 hours    MEDICATIONS  (PRN):  acetaminophen     Tablet .. 650 milliGRAM(s) Oral every 6 hours PRN Temp greater or equal to 38C (100.4F), Mild Pain (1 - 3)  melatonin 3 milliGRAM(s) Oral at bedtime PRN Insomnia  morphine  - Injectable 1 milliGRAM(s) IV Push every 4 hours PRN Moderate Pain (4 - 6)  morphine  - Injectable 2 milliGRAM(s) IV Push every 4 hours PRN Severe Pain (7 - 10)  morphine  - Injectable 0.5 milliGRAM(s) IV Push every 4 hours PRN Mild Pain (1 - 3)  ondansetron Injectable 4 milliGRAM(s) IV Push every 8 hours PRN Nausea and/or Vomiting      Allergies    adhesives (Pruritus; Rash)  No Known Drug Allergies    Intolerances    lactose (Diarrhea)        PHYSICAL EXAM  Vital Signs Last 24 Hrs  T(C): 36.3 (26 Aug 2023 05:11), Max: 36.9 (25 Aug 2023 13:34)  T(F): 97.4 (26 Aug 2023 05:11), Max: 98.4 (25 Aug 2023 13:34)  HR: 78 (26 Aug 2023 05:11) (73 - 92)  BP: 120/72 (26 Aug 2023 05:11) (98/49 - 122/87)  BP(mean): --  RR: 18 (26 Aug 2023 05:11) (16 - 18)  SpO2: 91% (26 Aug 2023 05:11) (91% - 100%)    Parameters below as of 26 Aug 2023 05:11  Patient On (Oxygen Delivery Method): room air          GENERAL:  Appears stated age  HEENT:  NC/AT  CHEST:  Full & symmetric excursion  HEART:  Regular rhythm  ABDOMEN:  Soft, non-tender, non-distended  EXTEREMITIES:  no cyanosis  SKIN:  No rash  NEURO:  Alert      LABS:                        9.5    8.13  )-----------( 266      ( 26 Aug 2023 06:20 )             29.9     08-26    140  |  106  |  9   ----------------------------<  111<H>  4.2   |  27  |  0.43<L>    Ca    8.7      26 Aug 2023 06:20  Phos  3.2     08-26  Mg     2.1     08-26    TPro  6.5  /  Alb  2.4<L>  /  TBili  0.4  /  DBili  x   /  AST  75<H>  /  ALT  52  /  AlkPhos  145<H>  08-26    PT/INR - ( 25 Aug 2023 06:02 )   PT: 12.1 sec;   INR: 1.04 ratio         PTT - ( 25 Aug 2023 06:02 )  PTT:30.8 sec

## 2023-08-26 NOTE — PROGRESS NOTE ADULT - ATTENDING COMMENTS
Pt seen, Examined, Case & care plan d/w pt, residents at detail.  AM labs   Advance Po diet-Low Fat   -Post op DVT PPX as per Surgeon  Post Op Incentive spirometry    Consults:  GI- Dr Mandujano D/W -S/P ERCP with CBD stone removed   ID-DR AMBER Kwok  -IV Vanco -LL Ext Cellulitis, IV Vanco & IV Zosyn   -Vascular-Dr Rubio- NEG DVT- out pt follow up  D/W Pt & family at Detail.  Total care time is 60 minutes..  I will be away starting 8/27/23 -9/5/23 AM , DR LATHAM will be covering me. 82 y/o F w/ PMHx of HLD, osteoarthritis, GERD, depression/anxiety, varicose veins ,Left  TKR  presents w/ sudden onset 10/10 upper abdominal pain since Sunday evening, pain radiation to bilateral lower back & bilateral shoulders. CT A/P : Mild, acute interstitial edematous pancreatitis in the pancreatic tail. CXR : Intrathoracic gastric fundal and body herniation ..RUQ U/S Echogenicity in the region of the gallbladder neck suggests small stones versus gravel. Gallbladder wall thickening identified measuring 7-8 mm. Trace pericholecystic fluid. Correlate for cholecystitis. Admitted for Gall stone  edematous pancreatitis & AC  Cholecystitis, Cholelithiasis  and Intrathoracic gastric fundal and body herniation  Pt seen, Examined, Case & care plan d/w pt, residents at detail. D/C Planning as per Surgery -Likely tomorrow .  AM labs   Advance Po diet-Low Fat   -Post op DVT PPX as per Surgeon  Post Op Incentive spirometry    Consults:  GI- Dr Mandujano D/W -S/P ERCP with CBD stone removed   ID-DR AMBER Kwok  -IV Vanco -LL Ext Cellulitis, IV Vanco & IV Zosyn   -Vascular-Dr Rubio- NEG DVT- out pt follow up  D/W Pt at Detail.  Total care time is 60 minutes..  I will be away starting 8/27/23 -9/5/23 AM , DR LATHAM will be covering me.

## 2023-08-26 NOTE — PROGRESS NOTE ADULT - SUBJECTIVE AND OBJECTIVE BOX
Patient is a 83y old  Female who presents with a chief complaint of cholecystitis, acute interstitial edematous pancreatitis.  Intrathoracic gastric fundal and body herniation (25 Aug 2023 20:31)    HPI:  84 y/o F w/ PMHx of HLD, osteoarthritis, GERD, depression/anxiety, varicose veins, B/L TKR  presents w/ sudden onset 10/10 upper abdominal pain since Sunday evening, pain radiation to bilateral lower back & bilateral shoulders. States  the pain was accompanied w/ nausea and several episodes of emesis that same night. She has had not appetite since then and has not eaten since Sunday night. Admits to chills, weakness, fatigue, nausea, abd bloating and several episodes of diarrhea since arrival to the ED. Denies headaches, chest pain, palpitations, shortness of breath, acid reflux, vomiting or urinary sx. Reports no past hx of pancreatitis. No hx of cancer.   Last colonoscopy was around 5 years ago, family hx of Crohn's - sister and granddaughter. Denies any recent alcohol use, drug abuse.     IN ED,  VITALS /69 HR 94 RR 20 Temp 98F on RA   PERTINENT LABS: Wbc 12k 82% predominant neutrophils, hb 11.3 RDW index 17.5   K 3.4 Glucose 106 AST 44 ALK po4 146  Lipase 2483  CT A/P : Mild, acute interstitial edematous pancreatitis in the pancreatic tail.  No discrete fluid collections. Edematous gallbladder wall and increased enhancement. The finding may be   secondary the presence of inflammation in the pancreas and  versus acute  cholecystitis. Recommend right upper quadrant abdominal ultrasound for  further evaluation and correlation.  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    Focus of air in the bladder. Correlate with instrumentation versus   infection. Correlate with urinalysis.    CXR : Intrathoracic gastric fundal and body herniation. Please see lower chest images of abdominal CT scan 8/22/2023. No airspace consolidation.    RUQ U/S Echogenicity in the region of the gallbladder neck suggests small stones versus gravel. Gallbladder wall thickening identified measuring 7-8 mm. Trace pericholecystic fluid. Correlate for cholecystitis.  s/ p  1X iv Tylenol zosyn, potassium 40mg X1,  2L NS in ER   EKG shows NSR 87 bpm QTc 462  (22 Aug 2023 20:04)    INTERVAL HPI:  8/23: Patient was seen and examined at bedside. They state they have been experiencing many loose BMs (>8 in the last 24 hours), loose BMs started prior to admission. Patient still has constant epigastric pain now at a 6/10. Patient denies HA, F/C, CP,SOB,Abd pain, dysuria, leg swelling, or leg pain. Going for MRCP today.  8/24: Patient was seen and examined at bedside. They state they have been having epigastric pain, radiating to the shoulder and back consistently still. Rated the pain as 8/10 at the worst, 6/10 when sleeping. She is continuing to have loose bowel movements, and states she had 4 loose bowel movements overnight with tenesmus. Patient states she has itching of her L lower extremity and trouble swallowing with a dry throat. Denies CP, SOB, dysuria, numbness and tingling of the lower extremities. Will be going for ERCP tomorrow. Cholecystectomy   Low H/H , On IV Abx   8/25: Patient seen and examined at bedside. She states she's been having constant loose BMs and has suprapubic pain with increased frequency. Itching of her L lower extremity has improved with lac-hydrin lotion. Denies cp, sob, numbness/tingling of LE. Plan for ERCP and cholecystectomy today. Stable H/H, on IV abx, Hypo K -replaced   8/26: POD #1 s/p Robotic virgil and ERCP.  Patient seen and examined at bedside. She states pain is well controlled with 7-8 at its highest. The patient does not remember if she passed a Bowel movement but did see feces on her diaper post surgery, she denies passing flatus. Itching of her L lower extremity has improved with lac-hydrin lotion. Denies F/C, N/V, CP, SOB, Leg swelling or leg pain.      OVERNIGHT EVENTS: None    Home Medications:  celecoxib 200 mg oral capsule: 1 cap(s) orally every 12 hours (22 Aug 2023 19:52)  Crestor 40 mg oral tablet: 1 tab(s) orally once a day (at bedtime) (22 Aug 2023 19:52)  Ecotrin Adult Low Strength 81 mg oral delayed release tablet: 1 tab(s) orally every 12 hours (22 Aug 2023 19:52)  Lexapro 20 mg oral tablet: 1 tab(s) orally once a day (22 Aug 2023 19:52)  pantoprazole 40 mg oral delayed release tablet: 1 tab(s) orally once a day (before a meal) (22 Aug 2023 19:52)  Trileptal 300 mg oral tablet: 1 tab(s) orally 2 times a day (22 Aug 2023 19:52)  Vitamin D3 1000 intl units (25 mcg) oral tablet: 1 tab(s) orally once a day (22 Aug 2023 19:52)      MEDICATIONS  (STANDING):  ammonium lactate 12% Lotion 1 Application(s) Topical two times a day  aspirin enteric coated 81 milliGRAM(s) Oral daily  atorvastatin 80 milliGRAM(s) Oral at bedtime  cholecalciferol 1000 Unit(s) Oral daily  enoxaparin Injectable 40 milliGRAM(s) SubCutaneous every 24 hours  escitalopram 20 milliGRAM(s) Oral daily  lactobacillus acidophilus 1 Tablet(s) Oral daily  OXcarbazepine 300 milliGRAM(s) Oral two times a day  pantoprazole    Tablet 40 milliGRAM(s) Oral before breakfast  piperacillin/tazobactam IVPB.. 3.375 Gram(s) IV Intermittent every 8 hours  vancomycin  IVPB 750 milliGRAM(s) IV Intermittent every 12 hours    MEDICATIONS  (PRN):  acetaminophen     Tablet .. 650 milliGRAM(s) Oral every 6 hours PRN Temp greater or equal to 38C (100.4F), Mild Pain (1 - 3)  melatonin 3 milliGRAM(s) Oral at bedtime PRN Insomnia  morphine  - Injectable 1 milliGRAM(s) IV Push every 4 hours PRN Moderate Pain (4 - 6)  morphine  - Injectable 0.5 milliGRAM(s) IV Push every 4 hours PRN Mild Pain (1 - 3)  morphine  - Injectable 2 milliGRAM(s) IV Push every 4 hours PRN Severe Pain (7 - 10)  ondansetron Injectable 4 milliGRAM(s) IV Push every 8 hours PRN Nausea and/or Vomiting      Allergies    adhesives (Pruritus; Rash)  No Known Drug Allergies    Intolerances    lactose (Diarrhea)      Social History:  Tobacco:  denies  EtOH: denies   Recreational drug use: denies   Lives with: family   Ambulates: with walker (22 Aug 2023 20:04)      REVIEW OF SYSTEMS:  CONSTITUTIONAL: No fever, No chills, No fatigue, No myalgia, No Body ache, No Weakness  EYES: No eye pain,  No visual disturbances, No discharge, NO Redness  ENMT:  No ear pain, No nose bleed, No vertigo; No sinus pain, NO throat pain, No Congestion  NECK: No pain, No stiffness  RESPIRATORY: No cough, NO wheezing, No  hemoptysis, NO  shortness of breath  CARDIOVASCULAR: No chest pain, palpitations  GASTROINTESTINAL: + abdominal pain, + epigastric pain. No nausea, No vomiting; No diarrhea, No constipation. [ x ] BM possible 8/25  GENITOURINARY: No dysuria, No frequency, No urgency, No hematuria, NO incontinence  NEUROLOGICAL: No headaches, No dizziness, No numbness, No tingling, No tremors, No weakness  EXT: No Swelling, No Pain, No Edema  SKIN:  [ x ] No itching, burning, rashes, or lesions   MUSCULOSKELETAL: No joint pain ,No Jt swelling; No muscle pain, No back pain, No extremity pain  PSYCHIATRIC: No depression,  No anxiety,  No mood swings ,No difficulty sleeping at night  PAIN SCALE: [  ] None  [ x ] Other- 7-8/10 at highest  REST OF REVIEW Of SYSTEM - [ x ] Normal     Vital Signs Last 24 Hrs  T(C): 36.3 (26 Aug 2023 05:11), Max: 36.9 (25 Aug 2023 13:34)  T(F): 97.4 (26 Aug 2023 05:11), Max: 98.4 (25 Aug 2023 13:34)  HR: 78 (26 Aug 2023 05:11) (73 - 92)  BP: 120/72 (26 Aug 2023 05:11) (98/49 - 122/87)  BP(mean): --  RR: 18 (26 Aug 2023 05:11) (16 - 18)  SpO2: 91% (26 Aug 2023 05:11) (91% - 100%)    Parameters below as of 26 Aug 2023 05:11  Patient On (Oxygen Delivery Method): room air      Finger Stick          PHYSICAL EXAM:  GENERAL:  [ x ] NAD , [ x ] well appearing, [  ] Agitated, [  ] Drowsy,  [  ] Lethargy, [  ] confused   HEAD:  [ x ] Normal, [  ] Other  EYES:  [  ] EOMI, [  ] PERRLA, [ x ] conjunctiva and sclera clear normal, [  ] Other,  [  ] Pallor,[  ] Discharge  ENMT:  [  ] Normal, [ x ] Moist mucous membranes, [  ] Good dentition, [  ] No Thrush  NECK:  [  ] Supple, [  x] No JVD, [ x ] Normal thyroid, [  ] Lymphadenopathy [  ] Other  CHEST/LUNG:  [ x ] Clear to auscultation bilaterally, [ x ] Breath Sounds equal B/L / Decrease, [  ] poor effort  [ x ] No rales, [ x ] No rhonchi  [ x ]  No wheezing,   HEART:  [ x ] Regular rate and rhythm, [  ] tachycardia, [  ] Bradycardia,  [  ] irregular  [ x ] No murmurs, No rubs, No gallops, [  ] PPM in place (Mfr:  )  ABDOMEN:  [ x ] Soft, [ x ] + tender in LLQ and RLQ, [ x ] Nondistended, [ x ] No mass, [  ] Bowel sounds present, [  ] obese  NERVOUS SYSTEM:  [ x ] Alert & Oriented X3, [  ] Nonfocal  [  ] Confusion  [  ] Encephalopathic [  ] Sedated [  ] Unable to assess, [  ] Dementia [  ] Other-  EXTREMITIES: [ x ] 2+ Peripheral Pulses, No clubbing, No cyanosis,  [  ] edema B/L lower EXT. [  ] PVD stasis skin changes B/L Lower EXT, [  ] wound  LYMPH: No lymphadenopathy noted  SKIN:  [ x ] Left lower 1/3 anterior tibia rashe has significantly less erythema , [  ] Pressure Ulcers, [  ] ecchymosis, [  ] Skin Tears, [  ] Other    DIET: Diet, Clear Liquid (08-25-23 @ 17:38)      LABS:                        9.5    8.13  )-----------( 266      ( 26 Aug 2023 06:20 )             29.9     25 Aug 2023 13:30    138    |  105    |  7      ----------------------------<  88     3.9     |  23     |  0.43     Ca    8.6        25 Aug 2023 13:30  Phos  3.2       26 Aug 2023 06:20  Mg     2.1       26 Aug 2023 06:20      PT/INR - ( 25 Aug 2023 06:02 )   PT: 12.1 sec;   INR: 1.04 ratio         PTT - ( 25 Aug 2023 06:02 )  PTT:30.8 sec  Urinalysis Basic - ( 25 Aug 2023 13:30 )    Color: x / Appearance: x / SG: x / pH: x  Gluc: 88 mg/dL / Ketone: x  / Bili: x / Urobili: x   Blood: x / Protein: x / Nitrite: x   Leuk Esterase: x / RBC: x / WBC x   Sq Epi: x / Non Sq Epi: x / Bacteria: x        Culture Results:   50,000 - 99,000 CFU/mL Escherichia coli (08-23 @ 06:00)  Culture Results:   No growth at 72 Hours (08-22 @ 22:22)  Culture Results:   No growth at 72 Hours (08-22 @ 21:50)                  Culture - Urine (collected 23 Aug 2023 06:00)  Source: Clean Catch Clean Catch (Midstream)  Final Report (25 Aug 2023 15:43):    50,000 - 99,000 CFU/mL Escherichia coli  Organism: Escherichia coli (25 Aug 2023 15:43)  Organism: Escherichia coli (25 Aug 2023 15:43)    Culture - Blood (collected 22 Aug 2023 22:22)  Source: .Blood Blood-Peripheral  Preliminary Report (26 Aug 2023 02:02):    No growth at 72 Hours    Culture - Blood (collected 22 Aug 2023 21:50)  Source: .Blood Blood-Peripheral  Preliminary Report (26 Aug 2023 02:02):    No growth at 72 Hours         Anemia Panel:      Thyroid Panel:        Lipase: 271 U/L (08-23-23 @ 08:06)  Amylase: 128 U/L (08-23-23 @ 08:06)  Lipase: 2483 U/L (08-22-23 @ 09:58)          RADIOLOGY & ADDITIONAL TESTS:      HEALTH ISSUES - PROBLEM Dx:  Pancreatitis    Cholecystitis    Left leg cellulitis    Anxiety and depression    Abnormal finding on CT scan    Need for prophylactic measure    Abdominal pain    Diarrhea            Consultant(s) Notes Reviewed:  [ x ] YES     Care Discussed with [X] Consultants  [ x ] Patient  [  ] Family [  ] HCP [  ]   [  ] Social Service  [  ] RN, [  ] Physical Therapy,[  ] Palliative care team  DVT PPX: [x  ] Lovenox, [  ] S C Heparin, [  ] Coumadin, [  ] Xarelto, [  ] Eliquis, [  ] Pradaxa, [  ] IV Heparin drip, [  ] SCD [  ] Contraindication 2 to GI Bleed,[  ] Ambulation [  ] Contraindicated 2 to  bleed [  ] Contraindicated 2 to Brain Bleed  Advanced directive: [ x ] None, [  ] DNR/DNI Patient is a 83y old  Female who presents with a chief complaint of cholecystitis, acute interstitial edematous pancreatitis.  Intrathoracic gastric fundal and body herniation (25 Aug 2023 20:31)    HPI:  84 y/o F w/ PMHx of HLD, osteoarthritis, GERD, depression/anxiety, varicose veins, B/L TKR  presents w/ sudden onset 10/10 upper abdominal pain since Sunday evening, pain radiation to bilateral lower back & bilateral shoulders. States  the pain was accompanied w/ nausea and several episodes of emesis that same night. She has had not appetite since then and has not eaten since Sunday night. Admits to chills, weakness, fatigue, nausea, abd bloating and several episodes of diarrhea since arrival to the ED. Denies headaches, chest pain, palpitations, shortness of breath, acid reflux, vomiting or urinary sx. Reports no past hx of pancreatitis. No hx of cancer.   Last colonoscopy was around 5 years ago, family hx of Crohn's - sister and granddaughter. Denies any recent alcohol use, drug abuse.     IN ED,  VITALS /69 HR 94 RR 20 Temp 98F on RA   PERTINENT LABS: Wbc 12k 82% predominant neutrophils, hb 11.3 RDW index 17.5   K 3.4 Glucose 106 AST 44 ALK po4 146  Lipase 2483  CT A/P : Mild, acute interstitial edematous pancreatitis in the pancreatic tail.  No discrete fluid collections. Edematous gallbladder wall and increased enhancement. The finding may be   secondary the presence of inflammation in the pancreas and  versus acute  cholecystitis. Recommend right upper quadrant abdominal ultrasound for  further evaluation and correlation.  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    Focus of air in the bladder. Correlate with instrumentation versus   infection. Correlate with urinalysis.    CXR : Intrathoracic gastric fundal and body herniation. Please see lower chest images of abdominal CT scan 8/22/2023. No airspace consolidation.    RUQ U/S Echogenicity in the region of the gallbladder neck suggests small stones versus gravel. Gallbladder wall thickening identified measuring 7-8 mm. Trace pericholecystic fluid. Correlate for cholecystitis.  s/ p  1X iv Tylenol zosyn, potassium 40mg X1,  2L NS in ER   EKG shows NSR 87 bpm QTc 462  (22 Aug 2023 20:04)    INTERVAL HPI:  8/23: Patient was seen and examined at bedside. They state they have been experiencing many loose BMs (>8 in the last 24 hours), loose BMs started prior to admission. Patient still has constant epigastric pain now at a 6/10. Patient denies HA, F/C, CP,SOB,Abd pain, dysuria, leg swelling, or leg pain. Going for MRCP today.  8/24: Patient was seen and examined at bedside. They state they have been having epigastric pain, radiating to the shoulder and back consistently still. Rated the pain as 8/10 at the worst, 6/10 when sleeping. She is continuing to have loose bowel movements, and states she had 4 loose bowel movements overnight with tenesmus. Patient states she has itching of her L lower extremity and trouble swallowing with a dry throat. Denies CP, SOB, dysuria, numbness and tingling of the lower extremities. Will be going for ERCP tomorrow. Cholecystectomy   Low H/H , On IV Abx   8/25: Patient seen and examined at bedside. She states she's been having constant loose BMs and has suprapubic pain with increased frequency. Itching of her L lower extremity has improved with lac-hydrin lotion. Denies cp, sob, numbness/tingling of LE. Plan for ERCP and cholecystectomy today. Stable H/H, on IV abx, Hypo K -replaced   8/26: POD #1 s/p Robotic virgil and ERCP.  Patient seen and examined at bedside. She states pain is well controlled with 7-8 at its highest. The patient does not remember if she passed a Bowel movement but did see feces on her diaper post surgery, she denies passing flatus. Itching of her L lower extremity has improved with lac-hydrin lotion. Denies F/C, N/V, CP, SOB, Leg swelling or leg pain.      OVERNIGHT EVENTS: None    Home Medications:  celecoxib 200 mg oral capsule: 1 cap(s) orally every 12 hours (22 Aug 2023 19:52)  Crestor 40 mg oral tablet: 1 tab(s) orally once a day (at bedtime) (22 Aug 2023 19:52)  Ecotrin Adult Low Strength 81 mg oral delayed release tablet: 1 tab(s) orally every 12 hours (22 Aug 2023 19:52)  Lexapro 20 mg oral tablet: 1 tab(s) orally once a day (22 Aug 2023 19:52)  pantoprazole 40 mg oral delayed release tablet: 1 tab(s) orally once a day (before a meal) (22 Aug 2023 19:52)  Trileptal 300 mg oral tablet: 1 tab(s) orally 2 times a day (22 Aug 2023 19:52)  Vitamin D3 1000 intl units (25 mcg) oral tablet: 1 tab(s) orally once a day (22 Aug 2023 19:52)      MEDICATIONS  (STANDING):  ammonium lactate 12% Lotion 1 Application(s) Topical two times a day  aspirin enteric coated 81 milliGRAM(s) Oral daily  atorvastatin 80 milliGRAM(s) Oral at bedtime  cholecalciferol 1000 Unit(s) Oral daily  enoxaparin Injectable 40 milliGRAM(s) SubCutaneous every 24 hours  escitalopram 20 milliGRAM(s) Oral daily  lactobacillus acidophilus 1 Tablet(s) Oral daily  OXcarbazepine 300 milliGRAM(s) Oral two times a day  pantoprazole    Tablet 40 milliGRAM(s) Oral before breakfast  piperacillin/tazobactam IVPB.. 3.375 Gram(s) IV Intermittent every 8 hours  vancomycin  IVPB 750 milliGRAM(s) IV Intermittent every 12 hours    MEDICATIONS  (PRN):  acetaminophen     Tablet .. 650 milliGRAM(s) Oral every 6 hours PRN Temp greater or equal to 38C (100.4F), Mild Pain (1 - 3)  melatonin 3 milliGRAM(s) Oral at bedtime PRN Insomnia  morphine  - Injectable 1 milliGRAM(s) IV Push every 4 hours PRN Moderate Pain (4 - 6)  morphine  - Injectable 0.5 milliGRAM(s) IV Push every 4 hours PRN Mild Pain (1 - 3)  morphine  - Injectable 2 milliGRAM(s) IV Push every 4 hours PRN Severe Pain (7 - 10)  ondansetron Injectable 4 milliGRAM(s) IV Push every 8 hours PRN Nausea and/or Vomiting      Allergies    adhesives (Pruritus; Rash)  No Known Drug Allergies    Intolerances    lactose (Diarrhea)      Social History:  Tobacco:  denies  EtOH: denies   Recreational drug use: denies   Lives with: family   Ambulates: with walker (22 Aug 2023 20:04)      REVIEW OF SYSTEMS:  CONSTITUTIONAL: No fever, No chills, No fatigue, No myalgia, No Body ache, No Weakness  EYES: No eye pain,  No visual disturbances, No discharge, NO Redness  ENMT:  No ear pain, No nose bleed, No vertigo; No sinus pain, NO throat pain, No Congestion  NECK: No pain, No stiffness  RESPIRATORY: No cough, NO wheezing, No  hemoptysis, NO  shortness of breath  CARDIOVASCULAR: No chest pain, palpitations  GASTROINTESTINAL: + abdominal pain, + epigastric pain. No nausea, No vomiting; No diarrhea, No constipation. [ x ] BM possible 8/25  GENITOURINARY: No dysuria, No frequency, No urgency, No hematuria, NO incontinence  NEUROLOGICAL: No headaches, No dizziness, No numbness, No tingling, No tremors, No weakness  EXT: No Swelling, No Pain, No Edema  SKIN:  [ x ] No itching, burning, rashes, or lesions   MUSCULOSKELETAL: No joint pain ,No Jt swelling; No muscle pain, No back pain, No extremity pain  PSYCHIATRIC: No depression,  No anxiety,  No mood swings ,No difficulty sleeping at night  PAIN SCALE: [  ] None  [ x ] Other- 7-8/10 at highest  REST OF REVIEW Of SYSTEM - [ x ] Normal     Vital Signs Last 24 Hrs  T(C): 36.3 (26 Aug 2023 05:11), Max: 36.9 (25 Aug 2023 13:34)  T(F): 97.4 (26 Aug 2023 05:11), Max: 98.4 (25 Aug 2023 13:34)  HR: 78 (26 Aug 2023 05:11) (73 - 92)  BP: 120/72 (26 Aug 2023 05:11) (98/49 - 122/87)  BP(mean): --  RR: 18 (26 Aug 2023 05:11) (16 - 18)  SpO2: 91% (26 Aug 2023 05:11) (91% - 100%)    Parameters below as of 26 Aug 2023 05:11  Patient On (Oxygen Delivery Method): room air      Finger Stick          PHYSICAL EXAM:  GENERAL:  [ x ] NAD , [ x ] well appearing, [  ] Agitated, [  ] Drowsy,  [  ] Lethargy, [  ] confused   HEAD:  [ x ] Normal, [  ] Other  EYES:  [  ] EOMI, [  ] PERRLA, [ x ] conjunctiva and sclera clear normal, [  ] Other,  [  ] Pallor,[  ] Discharge  ENMT:  [  ] Normal, [ x ] Moist mucous membranes, [  ] Good dentition, [  ] No Thrush  NECK:  [  ] Supple, [  x] No JVD, [ x ] Normal thyroid, [  ] Lymphadenopathy [  ] Other  CHEST/LUNG:  [ x ] Clear to auscultation bilaterally, [ x ] Breath Sounds equal B/L / Decrease, [  ] poor effort  [ x ] No rales, [ x ] No rhonchi  [ x ]  No wheezing,   HEART:  [ x ] Regular rate and rhythm, [  ] tachycardia, [  ] Bradycardia,  [  ] irregular  [ x ] No murmurs, No rubs, No gallops, [  ] PPM in place (Mfr:  )  ABDOMEN:  [ x ] Soft, [ x ] + tender in LLQ and RLQ, [ x ] Nondistended, [ x ] No mass, [  ] Bowel sounds present, [  ] obese [x] dressings are clean dry and intact  NERVOUS SYSTEM:  [ x ] Alert & Oriented X3, [  ] Nonfocal  [  ] Confusion  [  ] Encephalopathic [  ] Sedated [  ] Unable to assess, [  ] Dementia [  ] Other-  EXTREMITIES: [ x ] 2+ Peripheral Pulses, No clubbing, No cyanosis,  [  ] edema B/L lower EXT. [  ] PVD stasis skin changes B/L Lower EXT, [  ] wound  LYMPH: No lymphadenopathy noted  SKIN:  [ x ] Left lower 1/3 anterior tibia rashe has significantly less erythema , [  ] Pressure Ulcers, [  ] ecchymosis, [  ] Skin Tears, [  ] Other    DIET: Diet, Clear Liquid (08-25-23 @ 17:38)      LABS:                        9.5    8.13  )-----------( 266      ( 26 Aug 2023 06:20 )             29.9     25 Aug 2023 13:30    138    |  105    |  7      ----------------------------<  88     3.9     |  23     |  0.43     Ca    8.6        25 Aug 2023 13:30  Phos  3.2       26 Aug 2023 06:20  Mg     2.1       26 Aug 2023 06:20      PT/INR - ( 25 Aug 2023 06:02 )   PT: 12.1 sec;   INR: 1.04 ratio         PTT - ( 25 Aug 2023 06:02 )  PTT:30.8 sec  Urinalysis Basic - ( 25 Aug 2023 13:30 )    Color: x / Appearance: x / SG: x / pH: x  Gluc: 88 mg/dL / Ketone: x  / Bili: x / Urobili: x   Blood: x / Protein: x / Nitrite: x   Leuk Esterase: x / RBC: x / WBC x   Sq Epi: x / Non Sq Epi: x / Bacteria: x        Culture Results:   50,000 - 99,000 CFU/mL Escherichia coli (08-23 @ 06:00)  Culture Results:   No growth at 72 Hours (08-22 @ 22:22)  Culture Results:   No growth at 72 Hours (08-22 @ 21:50)                  Culture - Urine (collected 23 Aug 2023 06:00)  Source: Clean Catch Clean Catch (Midstream)  Final Report (25 Aug 2023 15:43):    50,000 - 99,000 CFU/mL Escherichia coli  Organism: Escherichia coli (25 Aug 2023 15:43)  Organism: Escherichia coli (25 Aug 2023 15:43)    Culture - Blood (collected 22 Aug 2023 22:22)  Source: .Blood Blood-Peripheral  Preliminary Report (26 Aug 2023 02:02):    No growth at 72 Hours    Culture - Blood (collected 22 Aug 2023 21:50)  Source: .Blood Blood-Peripheral  Preliminary Report (26 Aug 2023 02:02):    No growth at 72 Hours         Anemia Panel:      Thyroid Panel:        Lipase: 271 U/L (08-23-23 @ 08:06)  Amylase: 128 U/L (08-23-23 @ 08:06)  Lipase: 2483 U/L (08-22-23 @ 09:58)          RADIOLOGY & ADDITIONAL TESTS:      HEALTH ISSUES - PROBLEM Dx:  Pancreatitis    Cholecystitis    Left leg cellulitis    Anxiety and depression    Abnormal finding on CT scan    Need for prophylactic measure    Abdominal pain    Diarrhea            Consultant(s) Notes Reviewed:  [ x ] YES     Care Discussed with [X] Consultants  [ x ] Patient  [  ] Family [  ] HCP [  ]   [  ] Social Service  [  ] RN, [  ] Physical Therapy,[  ] Palliative care team  DVT PPX: [x  ] Lovenox, [  ] S C Heparin, [  ] Coumadin, [  ] Xarelto, [  ] Eliquis, [  ] Pradaxa, [  ] IV Heparin drip, [  ] SCD [  ] Contraindication 2 to GI Bleed,[  ] Ambulation [  ] Contraindicated 2 to  bleed [  ] Contraindicated 2 to Brain Bleed  Advanced directive: [ x ] None, [  ] DNR/DNI Patient is a 83y old  Female who presents with a chief complaint of cholecystitis, acute interstitial edematous pancreatitis.  Intrathoracic gastric fundal and body herniation (25 Aug 2023 20:31)    HPI:  84 y/o F w/ PMHx of HLD, osteoarthritis, GERD, depression/anxiety, varicose veins, B/L TKR  presents w/ sudden onset 10/10 upper abdominal pain since Sunday evening, pain radiation to bilateral lower back & bilateral shoulders. States  the pain was accompanied w/ nausea and several episodes of emesis that same night. She has had not appetite since then and has not eaten since Sunday night. Admits to chills, weakness, fatigue, nausea, abd bloating and several episodes of diarrhea since arrival to the ED. Denies headaches, chest pain, palpitations, shortness of breath, acid reflux, vomiting or urinary sx. Reports no past hx of pancreatitis. No hx of cancer.   Last colonoscopy was around 5 years ago, family hx of Crohn's - sister and granddaughter. Denies any recent alcohol use, drug abuse.     IN ED,  VITALS /69 HR 94 RR 20 Temp 98F on RA   PERTINENT LABS: Wbc 12k 82% predominant neutrophils, hb 11.3 RDW index 17.5   K 3.4 Glucose 106 AST 44 ALK po4 146  Lipase 2483  CT A/P : Mild, acute interstitial edematous pancreatitis in the pancreatic tail.  No discrete fluid collections. Edematous gallbladder wall and increased enhancement. The finding may be   secondary the presence of inflammation in the pancreas and  versus acute  cholecystitis. Recommend right upper quadrant abdominal ultrasound for  further evaluation and correlation.  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    Focus of air in the bladder. Correlate with instrumentation versus   infection. Correlate with urinalysis.    CXR : Intrathoracic gastric fundal and body herniation. Please see lower chest images of abdominal CT scan 8/22/2023. No airspace consolidation.    RUQ U/S Echogenicity in the region of the gallbladder neck suggests small stones versus gravel. Gallbladder wall thickening identified measuring 7-8 mm. Trace pericholecystic fluid. Correlate for cholecystitis.  s/ p  1X iv Tylenol zosyn, potassium 40mg X1,  2L NS in ER   EKG shows NSR 87 bpm QTc 462  (22 Aug 2023 20:04)    INTERVAL HPI:  8/23: Patient was seen and examined at bedside. They state they have been experiencing many loose BMs (>8 in the last 24 hours), loose BMs started prior to admission. Patient still has constant epigastric pain now at a 6/10. Patient denies HA, F/C, CP,SOB,Abd pain, dysuria, leg swelling, or leg pain. Going for MRCP today.  8/24: Patient was seen and examined at bedside. They state they have been having epigastric pain, radiating to the shoulder and back consistently still. Rated the pain as 8/10 at the worst, 6/10 when sleeping. She is continuing to have loose bowel movements, and states she had 4 loose bowel movements overnight with tenesmus. Patient states she has itching of her L lower extremity and trouble swallowing with a dry throat. Denies CP, SOB, dysuria, numbness and tingling of the lower extremities. Will be going for ERCP tomorrow. Cholecystectomy   Low H/H , On IV Abx   8/25: Patient seen and examined at bedside. She states she's been having constant loose BMs and has suprapubic pain with increased frequency. Itching of her L lower extremity has improved with lac-hydrin lotion. Denies cp, sob, numbness/tingling of LE. Plan for ERCP and cholecystectomy today. Stable H/H, on IV abx, Hypo K -replaced   8/26: POD #1 s/p Robotic virgil and ERCP.  Patient seen and examined at bedside. She states pain is well controlled with 7-8 at its highest. The patient does not remember if she passed a Bowel movement but did see feces on her diaper post surgery, she denies passing flatus. Itching of her L lower extremity has improved with lac-hydrin lotion. Denies F/C, N/V, CP, SOB, Leg swelling or leg pain. ON IV Vanco & Zosyn       OVERNIGHT EVENTS: None    Home Medications:  celecoxib 200 mg oral capsule: 1 cap(s) orally every 12 hours (22 Aug 2023 19:52)  Crestor 40 mg oral tablet: 1 tab(s) orally once a day (at bedtime) (22 Aug 2023 19:52)  Ecotrin Adult Low Strength 81 mg oral delayed release tablet: 1 tab(s) orally every 12 hours (22 Aug 2023 19:52)  Lexapro 20 mg oral tablet: 1 tab(s) orally once a day (22 Aug 2023 19:52)  pantoprazole 40 mg oral delayed release tablet: 1 tab(s) orally once a day (before a meal) (22 Aug 2023 19:52)  Trileptal 300 mg oral tablet: 1 tab(s) orally 2 times a day (22 Aug 2023 19:52)  Vitamin D3 1000 intl units (25 mcg) oral tablet: 1 tab(s) orally once a day (22 Aug 2023 19:52)      MEDICATIONS  (STANDING):  ammonium lactate 12% Lotion 1 Application(s) Topical two times a day  aspirin enteric coated 81 milliGRAM(s) Oral daily  atorvastatin 80 milliGRAM(s) Oral at bedtime  cholecalciferol 1000 Unit(s) Oral daily  enoxaparin Injectable 40 milliGRAM(s) SubCutaneous every 24 hours  escitalopram 20 milliGRAM(s) Oral daily  lactobacillus acidophilus 1 Tablet(s) Oral daily  OXcarbazepine 300 milliGRAM(s) Oral two times a day  pantoprazole    Tablet 40 milliGRAM(s) Oral before breakfast  piperacillin/tazobactam IVPB.. 3.375 Gram(s) IV Intermittent every 8 hours  vancomycin  IVPB 750 milliGRAM(s) IV Intermittent every 12 hours    MEDICATIONS  (PRN):  acetaminophen     Tablet .. 650 milliGRAM(s) Oral every 6 hours PRN Temp greater or equal to 38C (100.4F), Mild Pain (1 - 3)  melatonin 3 milliGRAM(s) Oral at bedtime PRN Insomnia  morphine  - Injectable 1 milliGRAM(s) IV Push every 4 hours PRN Moderate Pain (4 - 6)  morphine  - Injectable 0.5 milliGRAM(s) IV Push every 4 hours PRN Mild Pain (1 - 3)  morphine  - Injectable 2 milliGRAM(s) IV Push every 4 hours PRN Severe Pain (7 - 10)  ondansetron Injectable 4 milliGRAM(s) IV Push every 8 hours PRN Nausea and/or Vomiting      Allergies    adhesives (Pruritus; Rash)  No Known Drug Allergies    Intolerances    lactose (Diarrhea)      Social History:  Tobacco:  denies  EtOH: denies   Recreational drug use: denies   Lives with: family   Ambulates: with walker (22 Aug 2023 20:04)      REVIEW OF SYSTEMS: i am OK   CONSTITUTIONAL: No fever, No chills, No fatigue, No myalgia, No Body ache, No Weakness  EYES: No eye pain,  No visual disturbances, No discharge, NO Redness  ENMT:  No ear pain, No nose bleed, No vertigo; No sinus pain, NO throat pain, No Congestion  NECK: No pain, No stiffness  RESPIRATORY: No cough, NO wheezing, No  hemoptysis, NO  shortness of breath  CARDIOVASCULAR: No chest pain, palpitations  GASTROINTESTINAL: + Mild  abdominal pain, + epigastric pain. No nausea, No vomiting; No diarrhea, No constipation. [ x ] BM possible 8/25  GENITOURINARY: No dysuria, No frequency, No urgency, No hematuria, NO incontinence  NEUROLOGICAL: No headaches, No dizziness, No numbness, No tingling, No tremors, No weakness  EXT: No Swelling, No Pain, No Edema  SKIN:  [ x ] No itching, burning, rashes, or lesions   MUSCULOSKELETAL: No joint pain ,No Jt swelling; No muscle pain, No back pain, No extremity pain  PSYCHIATRIC: No depression,  No anxiety,  No mood swings ,No difficulty sleeping at night  PAIN SCALE: [  ] None  [ x ] Other- 7-8/10 at highest  REST OF REVIEW Of SYSTEM - [ x ] Normal     Vital Signs Last 24 Hrs  T(C): 36.3 (26 Aug 2023 05:11), Max: 36.9 (25 Aug 2023 13:34)  T(F): 97.4 (26 Aug 2023 05:11), Max: 98.4 (25 Aug 2023 13:34)  HR: 78 (26 Aug 2023 05:11) (73 - 92)  BP: 120/72 (26 Aug 2023 05:11) (98/49 - 122/87)  BP(mean): --  RR: 18 (26 Aug 2023 05:11) (16 - 18)  SpO2: 91% (26 Aug 2023 05:11) (91% - 100%)    Parameters below as of 26 Aug 2023 05:11  Patient On (Oxygen Delivery Method): room air      Finger Stick          PHYSICAL EXAM:  GENERAL:  [ x ] NAD , [ x ] well appearing, [  ] Agitated, [  ] Drowsy,  [  ] Lethargy, [  ] confused   HEAD:  [ x ] Normal, [  ] Other  EYES:  [  ] EOMI, [  ] PERRLA, [ x ] conjunctiva and sclera clear normal, [  ] Other,  [  ] Pallor,[  ] Discharge  ENMT:  [ x ] Normal, [ x ] Moist mucous membranes, [ x ] Good dentition, [x ] No Thrush  NECK:  [  ] Supple, [  x] No JVD, [ x ] Normal thyroid, [  ] Lymphadenopathy [  ] Other  CHEST/LUNG:  [ x ] Clear to auscultation bilaterally, [ x ] Breath Sounds equal B/L / Decrease, [ x ] poor effort  [ x ] No rales, [ x ] No rhonchi  [ x ]  No wheezing,   HEART:  [ x ] Regular rate and rhythm, [  ] tachycardia, [  ] Bradycardia,  [  ] irregular  [ x ] No murmurs, No rubs, No gallops, [  ] PPM in place (Mfr:  )  ABDOMEN:  [ x ] Soft, [ x ] + tender in LLQ and RLQ, No R/R/G  [ x ] Nondistended, [ x ] No mass, [x  ] Bowel sounds present, [  ] obese [x] dressings are clean dry and intact at Sx site   NERVOUS SYSTEM:  [ x ] Alert & Oriented X3, x  ] Nonfocal  [  ] Confusion  [  ] Encephalopathic [  ] Sedated [  ] Unable to assess, [  ] Dementia [  ] Other-  EXTREMITIES: [ x ] 2+ Peripheral Pulses, No clubbing, No cyanosis,  [  ] edema B/L lower EXT. [  ] PVD stasis skin changes B/L Lower EXT, [  ] wound  LYMPH: No lymphadenopathy noted  SKIN:  [ x ] Left lower 1/3 anterior tibia rash  has significantly less erythema mild area of erythema left Post calf ? Phlebitis  , [  ] Pressure Ulcers, [  ] ecchymosis, [  ] Skin Tears, [  ] Other    DIET: Diet, Clear Liquid (08-25-23 @ 17:38)      LABS:                        9.5    8.13  )-----------( 266      ( 26 Aug 2023 06:20 )             29.9     25 Aug 2023 13:30    138    |  105    |  7      ----------------------------<  88     3.9     |  23     |  0.43     Ca    8.6        25 Aug 2023 13:30  Phos  3.2       26 Aug 2023 06:20  Mg     2.1       26 Aug 2023 06:20      PT/INR - ( 25 Aug 2023 06:02 )   PT: 12.1 sec;   INR: 1.04 ratio         PTT - ( 25 Aug 2023 06:02 )  PTT:30.8 sec  Urinalysis Basic - ( 25 Aug 2023 13:30 )    Color: x / Appearance: x / SG: x / pH: x  Gluc: 88 mg/dL / Ketone: x  / Bili: x / Urobili: x   Blood: x / Protein: x / Nitrite: x   Leuk Esterase: x / RBC: x / WBC x   Sq Epi: x / Non Sq Epi: x / Bacteria: x        Culture Results:   50,000 - 99,000 CFU/mL Escherichia coli (08-23 @ 06:00)  Culture Results:   No growth at 72 Hours (08-22 @ 22:22)  Culture Results:   No growth at 72 Hours (08-22 @ 21:50)                  Culture - Urine (collected 23 Aug 2023 06:00)  Source: Clean Catch Clean Catch (Midstream)  Final Report (25 Aug 2023 15:43):    50,000 - 99,000 CFU/mL Escherichia coli  Organism: Escherichia coli (25 Aug 2023 15:43)  Organism: Escherichia coli (25 Aug 2023 15:43)    Culture - Blood (collected 22 Aug 2023 22:22)  Source: .Blood Blood-Peripheral  Preliminary Report (26 Aug 2023 02:02):    No growth at 72 Hours    Culture - Blood (collected 22 Aug 2023 21:50)  Source: .Blood Blood-Peripheral  Preliminary Report (26 Aug 2023 02:02):    No growth at 72 Hours         Anemia Panel:      Thyroid Panel:        Lipase: 271 U/L (08-23-23 @ 08:06)  Amylase: 128 U/L (08-23-23 @ 08:06)  Lipase: 2483 U/L (08-22-23 @ 09:58)          RADIOLOGY & ADDITIONAL TESTS:      HEALTH ISSUES - PROBLEM Dx:  Pancreatitis    Cholecystitis    Left leg cellulitis    Anxiety and depression    Abnormal finding on CT scan    Need for prophylactic measure    Abdominal pain    Diarrhea            Consultant(s) Notes Reviewed:  [ x ] YES     Care Discussed with [X] Consultants  [ x ] Patient  [  ] Family [  ] HCP [  ]   [  ] Social Service  [ x ] RN, [  ] Physical Therapy,[  ] Palliative care team  DVT PPX: [x  ] Lovenox, [  ] S C Heparin, [  ] Coumadin, [  ] Xarelto, [  ] Eliquis, [  ] Pradaxa, [  ] IV Heparin drip, [  ] SCD [  ] Contraindication 2 to GI Bleed,[  ] Ambulation [  ] Contraindicated 2 to  bleed [  ] Contraindicated 2 to Brain Bleed  Advanced directive: [ x ] None, [  ] DNR/DNI

## 2023-08-26 NOTE — PROGRESS NOTE ADULT - ASSESSMENT
84y/o Female PMHx of HLD, osteoarthritis, GERD, depression/anxiety, varicose veins, POD#1 s/p robotic assisted laparoscopic cholecystectomy with ERCP for gallstone pancreatitis. Patient today with VSSAF,

## 2023-08-26 NOTE — PROGRESS NOTE ADULT - SUBJECTIVE AND OBJECTIVE BOX
S: Patient seen and examined at bedside, pt now s/p robotic assisted laparoscopic cholecystectomy. Vascular surgery called to evaluate LLE to r/o thrombophlebitis.  Patient reports abdominal pain s/p surgery. Admits to ambulating with assistance of walker. Patient reports pain to the back of the left leg since arrival at hospital. Patient denies any fever, chills, chest pain, shortness of breath, nausea, vomiting.    MEDICATIONS:  acetaminophen     Tablet .. 650 milliGRAM(s) Oral every 6 hours PRN  ammonium lactate 12% Lotion 1 Application(s) Topical two times a day  aspirin enteric coated 81 milliGRAM(s) Oral daily  atorvastatin 80 milliGRAM(s) Oral at bedtime  cholecalciferol 1000 Unit(s) Oral daily  enoxaparin Injectable 40 milliGRAM(s) SubCutaneous every 24 hours  escitalopram 20 milliGRAM(s) Oral daily  lactobacillus acidophilus 1 Tablet(s) Oral daily  melatonin 3 milliGRAM(s) Oral at bedtime PRN  morphine  - Injectable 0.5 milliGRAM(s) IV Push every 4 hours PRN  morphine  - Injectable 1 milliGRAM(s) IV Push every 4 hours PRN  morphine  - Injectable 2 milliGRAM(s) IV Push every 4 hours PRN  ondansetron Injectable 4 milliGRAM(s) IV Push every 8 hours PRN  OXcarbazepine 300 milliGRAM(s) Oral two times a day  pantoprazole    Tablet 40 milliGRAM(s) Oral before breakfast  piperacillin/tazobactam IVPB.. 3.375 Gram(s) IV Intermittent every 8 hours  vancomycin  IVPB 750 milliGRAM(s) IV Intermittent every 12 hours      O:  Vital Signs Last 24 Hrs  T(C): 36.6 (26 Aug 2023 12:47), Max: 36.9 (25 Aug 2023 13:34)  T(F): 97.9 (26 Aug 2023 12:47), Max: 98.4 (25 Aug 2023 13:34)  HR: 74 (26 Aug 2023 12:47) (73 - 92)  BP: 113/73 (26 Aug 2023 12:47) (98/49 - 120/83)  RR: 18 (26 Aug 2023 12:47) (16 - 18)  SpO2: 94% (26 Aug 2023 12:47) (91% - 100%)    Parameters below as of 26 Aug 2023 12:47  Patient On (Oxygen Delivery Method): room air    PHYSICAL EXAM:  GENERAL: No acute distress, patient ambulating in hallway with walker   HEAD:  Atraumatic, Normocephalic  CHEST/LUNG: Non labored respirations, no accessory muscle use  HEART: Regular rate and rhythme  EXT: no gross deformities, able to move all four extremities, LLE with area of discoloration/hyperpigmentation on the anterior portion of the mid calf. Posterior mid calf with area of ecchymosis surrounding dark eschar, tenderness to palpation worse on eschar, induration of the surrounding area. B/l PT and DP palpable.  NEUROLOGY: A&O x 3, no focal deficits      LABS:                        9.5    8.13  )-----------( 266      ( 26 Aug 2023 06:20 )             29.9     08-26    140  |  106  |  9   ----------------------------<  111<H>  4.2   |  27  |  0.43<L>    Ca    8.7      26 Aug 2023 06:20  Phos  3.2     08-26  Mg     2.1     08-26    TPro  6.5  /  Alb  2.4<L>  /  TBili  0.4  /  DBili  x   /  AST  75<H>  /  ALT  52  /  AlkPhos  145<H>  08-26    PT/INR - ( 25 Aug 2023 06:02 )   PT: 12.1 sec;   INR: 1.04 ratio         PTT - ( 25 Aug 2023 06:02 )  PTT:30.8 sec    RADIOLOGY:  < from: US Duplex Venous Lower Ext Ltd, Left (08.26.23 @ 10:03) >  ACC: [id number] EXAM:  US DPLX LWR EXT VEINS LTD LT   [facility] [place]     PROCEDURE DATE:  08/26/2023          INTERPRETATION:  CLINICAL INFORMATION: Left leg pain.    COMPARISON: Venous Doppler dated 08/23/2023.    TECHNIQUE: Duplex sonography of the LEFT LOWER extremity veins with color   and spectral Doppler, with and without compression.    FINDINGS:    There is normal compressibility of the left common femoral, femoral and   popliteal veins.  The contralateral common femoral vein is patent.  Doppler examination shows normal spontaneous and phasic flow.    No calf vein thrombosis is detected.    IMPRESSION:  No evidence of left lower extremity deep venous thrombosis.    --- End of Report ---      CARMELA RO MD; Attending Radiologist  This document has been electronically signed. Aug 26 2023 10:11AM    < end of copied text >    ASSESSMENT: 84y/o Female a/w gallstone pancreatitis, found to have EPEC. Vascular surgery called for splenic arterial aneurysm, and possible thrombophlebitis. VSSAF, exam with ecchymosis and induration to the LLE posterior aspect of calf with tenderness to palpation. Ultrasound duplex negative for DVT.     PLAN:  - VSSAF, am labs reassuring  - Exam with ttp LLE posterior calf, with ecchymosis and eschar. Obtained US duplex, no evidence of LLE DVT.   - COntinue ASA, LVX  - Pain control as needed.   - Final recs pending discussion with attending   - Continue care per primary team    To discuss with Dr. Kovacs (covering for Dr. Escalona)   S: Patient seen and examined at bedside, pt now s/p robotic assisted laparoscopic cholecystectomy. Vascular surgery called to evaluate LLE to r/o thrombophlebitis.  Patient reports abdominal pain s/p surgery. Admits to ambulating with assistance of walker. Patient reports pain to the back of the left leg since arrival at hospital. Patient denies any fever, chills, chest pain, shortness of breath, nausea, vomiting.    MEDICATIONS:  acetaminophen     Tablet .. 650 milliGRAM(s) Oral every 6 hours PRN  ammonium lactate 12% Lotion 1 Application(s) Topical two times a day  aspirin enteric coated 81 milliGRAM(s) Oral daily  atorvastatin 80 milliGRAM(s) Oral at bedtime  cholecalciferol 1000 Unit(s) Oral daily  enoxaparin Injectable 40 milliGRAM(s) SubCutaneous every 24 hours  escitalopram 20 milliGRAM(s) Oral daily  lactobacillus acidophilus 1 Tablet(s) Oral daily  melatonin 3 milliGRAM(s) Oral at bedtime PRN  morphine  - Injectable 0.5 milliGRAM(s) IV Push every 4 hours PRN  morphine  - Injectable 1 milliGRAM(s) IV Push every 4 hours PRN  morphine  - Injectable 2 milliGRAM(s) IV Push every 4 hours PRN  ondansetron Injectable 4 milliGRAM(s) IV Push every 8 hours PRN  OXcarbazepine 300 milliGRAM(s) Oral two times a day  pantoprazole    Tablet 40 milliGRAM(s) Oral before breakfast  piperacillin/tazobactam IVPB.. 3.375 Gram(s) IV Intermittent every 8 hours  vancomycin  IVPB 750 milliGRAM(s) IV Intermittent every 12 hours      O:  Vital Signs Last 24 Hrs  T(C): 36.6 (26 Aug 2023 12:47), Max: 36.9 (25 Aug 2023 13:34)  T(F): 97.9 (26 Aug 2023 12:47), Max: 98.4 (25 Aug 2023 13:34)  HR: 74 (26 Aug 2023 12:47) (73 - 92)  BP: 113/73 (26 Aug 2023 12:47) (98/49 - 120/83)  RR: 18 (26 Aug 2023 12:47) (16 - 18)  SpO2: 94% (26 Aug 2023 12:47) (91% - 100%)    Parameters below as of 26 Aug 2023 12:47  Patient On (Oxygen Delivery Method): room air    PHYSICAL EXAM:  GENERAL: No acute distress, patient ambulating in hallway with walker   HEAD:  Atraumatic, Normocephalic  CHEST/LUNG: Non labored respirations, no accessory muscle use  HEART: Regular rate and rhythme  EXT: no gross deformities, able to move all four extremities, LLE with area of discoloration/hyperpigmentation on the anterior portion of the mid calf. Posterior mid calf with area of ecchymosis surrounding dark eschar, tenderness to palpation worse on eschar, induration of the surrounding area. B/l PT and DP palpable.  NEUROLOGY: A&O x 3, no focal deficits      LABS:                        9.5    8.13  )-----------( 266      ( 26 Aug 2023 06:20 )             29.9     08-26    140  |  106  |  9   ----------------------------<  111<H>  4.2   |  27  |  0.43<L>    Ca    8.7      26 Aug 2023 06:20  Phos  3.2     08-26  Mg     2.1     08-26    TPro  6.5  /  Alb  2.4<L>  /  TBili  0.4  /  DBili  x   /  AST  75<H>  /  ALT  52  /  AlkPhos  145<H>  08-26    PT/INR - ( 25 Aug 2023 06:02 )   PT: 12.1 sec;   INR: 1.04 ratio         PTT - ( 25 Aug 2023 06:02 )  PTT:30.8 sec    RADIOLOGY:  < from: US Duplex Venous Lower Ext Ltd, Left (08.26.23 @ 10:03) >  ACC: [id number] EXAM:  US DPLX LWR EXT VEINS LTD LT   [facility]     PROCEDURE DATE:  08/26/2023          INTERPRETATION:  CLINICAL INFORMATION: Left leg pain.    COMPARISON: Venous Doppler dated 08/23/2023.    TECHNIQUE: Duplex sonography of the LEFT LOWER extremity veins with color   and spectral Doppler, with and without compression.    FINDINGS:    There is normal compressibility of the left common femoral, femoral and   popliteal veins.  The contralateral common femoral vein is patent.  Doppler examination shows normal spontaneous and phasic flow.    No calf vein thrombosis is detected.    IMPRESSION:  No evidence of left lower extremity deep venous thrombosis.    --- End of Report ---      CARMELA RO MD; Attending Radiologist  This document has been electronically signed. Aug 26 2023 10:11AM    < end of copied text >    ASSESSMENT: 82y/o Female a/w gallstone pancreatitis, found to have EPEC. Vascular surgery called for splenic arterial aneurysm, and possible thrombophlebitis. VSSAF, exam with ecchymosis and induration to the LLE posterior aspect of calf with tenderness to palpation. Ultrasound duplex negative for DVT and SVT    PLAN:  - VSSAF, am labs reassuring  - Exam with ttp LLE posterior calf, with ecchymosis and eschar. Obtained US duplex, no evidence of LLE DVT/SVT  - COntinue ASA, LVX  - Pain control as needed.   - Continue care per primary team  - Follow up with Dr. Escalona outpatient once d/c'd.    To discuss with Dr. Kovacs (covering for Dr. Escalona)

## 2023-08-26 NOTE — PROGRESS NOTE ADULT - PROBLEM SELECTOR PLAN 1
- S/P  abdominal pain, n/v,/ d + lipase - 2483 l  -  acute pancreatitis.- -Gall stone Pancreatitis   - CT A/P : Mild, acute interstitial edematous pancreatitis in the pancreatic tail. .  - RUQ U/S Echogenicity in the region of the gallbladder neck suggests small stones versus gravel. Gallbladder wall thickening identified measuring 7-8 mm. Trace pericholecystic fluid. Correlate for cholecystitis.  -s/p POD#1 robotic virgil and ERCP  - GI (Dr Mandujano), consulted   -On IV Abx as per ID- IV Zosyn, IV vancomycin Ac Cholecystitis with Cholelithiasis   -- RUQ U/S Echogenicity in the region of the gallbladder neck suggests small stones versus gravel. Gallbladder wall thickening identified measuring 7-8 mm. Trace pericholecystic fluid. Correlate for cholecystitis.  - s/p POD#1 robotic virgil and ERCP  -On IV zosyn, and Vancomycin  -On CLD, advance as tolerated  Surgery (Dr xavier)  consulted  GI(Dr Mandujano) consulted  Pain meds prn    Patient is medically optimized for procedure

## 2023-08-26 NOTE — PROGRESS NOTE ADULT - PROBLEM SELECTOR PLAN 6
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 Rubio)- NEG DVT - No Vascular intervention   - No acute intervention at this time continue home Lexapro and trileptal ( for depression)   follow psych Dr MEJIA  out patient

## 2023-08-26 NOTE — PROGRESS NOTE ADULT - PROBLEM SELECTOR PLAN 5
continue home Lexapro and trileptal ( for depression)   follow psych Dr MEJIA  out patient - Patient presents with mild erythema LL Ext ,+ itching and tender to touch  likely 2/2 LLE cellulitis  - Given 1 x dose IV zosyn , 2L NS Bolus in the ED  - c/w IV vancomycin and zosyn  - Bcx and Ucx, NGTD  - MRSA PCR negative   - Lower extremity doppler us negative  - WBC downtrending  - ID Dr. AMBER Kwok d/w

## 2023-08-26 NOTE — PROGRESS NOTE ADULT - ASSESSMENT
s/p ercp es stone removal   stone and large hiatal hernia  Pancreatitis  Cholecystitis  ?choledocholithiasis      adv to clears if pain free  gerd precautions  if pain free can d/c home in am  ppi once a day   CT and US noted  LFTs noted  MRCP noted  s/p ERCP and cholecystectomy   risks/benefits/alternatives including but not limited to bleeding, infection, perforation or injury requiring surgery all discussed  pt currently has pancreatitis; risk of worsening 2/2 ERCP d/w pt  chance of needing biliary or pancreatic stent also discussed which would necessitate future procedures also discussed  patient understands and agrees to proceed with procedure  discussed all of the above with pt, , and daughter    I reviewed the overnight course of events on the unit, re-confirming the patient history. I discussed the care with the patient and their family  Differential diagnosis and plan of care discussed with patient after the evaluation  40 minutes spent on total encounter of which more than fifty percent of the encounter was spent counseling and/or coordinating care by the attending physician.  Advanced care planning was discussed with patient and family.  Advanced care planning forms were reviewed and discussed.  Risks, benefits and alternatives of gastroenterologic procedures were discussed in detail and all questions were answered.

## 2023-08-26 NOTE — PROGRESS NOTE ADULT - PROBLEM SELECTOR PLAN 2
Ac Cholecystitis with Cholelithiasis   -- RUQ U/S Echogenicity in the region of the gallbladder neck suggests small stones versus gravel. Gallbladder wall thickening identified measuring 7-8 mm. Trace pericholecystic fluid. Correlate for cholecystitis.  - s/p POD#1 robotic virgil and ERCP  -On IV zosyn, and Vancomycin  -On CLD, advance as tolerated  Surgery (Dr xavier)  consulted  GI(Dr Mandujano) consulted  Pain meds prn    Patient is medically optimized for procedure - S/P  abdominal pain, n/v,/ d + lipase - 2483 l  -  acute pancreatitis.- -Gall stone Pancreatitis   - CT A/P : Mild, acute interstitial edematous pancreatitis in the pancreatic tail. .  - RUQ U/S Echogenicity in the region of the gallbladder neck suggests small stones versus gravel. Gallbladder wall thickening identified measuring 7-8 mm. Trace pericholecystic fluid. Correlate for cholecystitis.  -s/p POD#1 robotic virgil and ERCP  - GI (Dr Mandujano), consulted   -On IV Abx as per ID- IV Zosyn, IV vancomycin

## 2023-08-26 NOTE — PROGRESS NOTE ADULT - PROBLEM SELECTOR PLAN 4
- Patient presents with mild erythema LL Ext ,+ itching and tender to touch  likely 2/2 LLE cellulitis  - Given 1 x dose IV zosyn , 2L NS Bolus in the ED  - c/w IV vancomycin and zosyn  - Bcx and Ucx, NGTD  - MRSA PCR negative   - Lower extremity doppler us negative  - WBC downtrending  - ID Dr. AMBER Kwok d/w Patient c/o of diarrhea w/ suprapubic pain and associated increased urinary frequency  - GI PCR --> +EPEC  - s/p 1000mg x1 Azithromycin  - IV vancomycin, c/w IV zosyn,   - Discontinue meropenem  - Replete electrolytes PRN  - ID (Dr. Kwok) following, f/u recs    #Hypokalemia - resolved  f/u cmp    #hypophosphatemia - resolved

## 2023-08-26 NOTE — PROGRESS NOTE ADULT - PROBLEM SELECTOR PLAN 3
Patient c/o of diarrhea w/ suprapubic pain and associated increased urinary frequency  - GI PCR --> +EPEC  - s/p 1000mg x1 Azithromycin  - IV vancomycin, c/w IV zosyn,   - Discontinue meropenem  - Replete electrolytes PRN  - ID (Dr. Kwok) following, f/u recs    #Hypokalemia - resolved  f/u cmp    #hypophosphatemia - resolved Multifactorial etiology with AC Shama & AC Pancreatitis with IV Hydration & Daily Lab Draws   CBC to follow -No Bleeding.

## 2023-08-27 ENCOUNTER — TRANSCRIPTION ENCOUNTER (OUTPATIENT)
Age: 83
End: 2023-08-27

## 2023-08-27 VITALS
RESPIRATION RATE: 18 BRPM | TEMPERATURE: 99 F | OXYGEN SATURATION: 91 % | DIASTOLIC BLOOD PRESSURE: 78 MMHG | SYSTOLIC BLOOD PRESSURE: 125 MMHG | HEART RATE: 94 BPM

## 2023-08-27 LAB
ALBUMIN SERPL ELPH-MCNC: 2.5 G/DL — LOW (ref 3.3–5)
ALP SERPL-CCNC: 118 U/L — SIGNIFICANT CHANGE UP (ref 40–120)
ALT FLD-CCNC: 46 U/L — SIGNIFICANT CHANGE UP (ref 12–78)
ANION GAP SERPL CALC-SCNC: 7 MMOL/L — SIGNIFICANT CHANGE UP (ref 5–17)
AST SERPL-CCNC: 66 U/L — HIGH (ref 15–37)
BASOPHILS # BLD AUTO: 0 K/UL — SIGNIFICANT CHANGE UP (ref 0–0.2)
BASOPHILS NFR BLD AUTO: 0 % — SIGNIFICANT CHANGE UP (ref 0–2)
BILIRUB SERPL-MCNC: 0.2 MG/DL — SIGNIFICANT CHANGE UP (ref 0.2–1.2)
BUN SERPL-MCNC: 9 MG/DL — SIGNIFICANT CHANGE UP (ref 7–23)
CALCIUM SERPL-MCNC: 8.5 MG/DL — SIGNIFICANT CHANGE UP (ref 8.5–10.1)
CHLORIDE SERPL-SCNC: 100 MMOL/L — SIGNIFICANT CHANGE UP (ref 96–108)
CO2 SERPL-SCNC: 28 MMOL/L — SIGNIFICANT CHANGE UP (ref 22–31)
CREAT SERPL-MCNC: 0.57 MG/DL — SIGNIFICANT CHANGE UP (ref 0.5–1.3)
EGFR: 90 ML/MIN/1.73M2 — SIGNIFICANT CHANGE UP
EOSINOPHIL # BLD AUTO: 0.06 K/UL — SIGNIFICANT CHANGE UP (ref 0–0.5)
EOSINOPHIL NFR BLD AUTO: 1 % — SIGNIFICANT CHANGE UP (ref 0–6)
GLUCOSE SERPL-MCNC: 109 MG/DL — HIGH (ref 70–99)
HCT VFR BLD CALC: 28.5 % — LOW (ref 34.5–45)
HGB BLD-MCNC: 9.4 G/DL — LOW (ref 11.5–15.5)
LYMPHOCYTES # BLD AUTO: 0.7 K/UL — LOW (ref 1–3.3)
LYMPHOCYTES # BLD AUTO: 11 % — LOW (ref 13–44)
MCHC RBC-ENTMCNC: 25.8 PG — LOW (ref 27–34)
MCHC RBC-ENTMCNC: 33 GM/DL — SIGNIFICANT CHANGE UP (ref 32–36)
MCV RBC AUTO: 78.3 FL — LOW (ref 80–100)
MONOCYTES # BLD AUTO: 1.27 K/UL — HIGH (ref 0–0.9)
MONOCYTES NFR BLD AUTO: 20 % — HIGH (ref 2–14)
NEUTROPHILS # BLD AUTO: 4.26 K/UL — SIGNIFICANT CHANGE UP (ref 1.8–7.4)
NEUTROPHILS NFR BLD AUTO: 67 % — SIGNIFICANT CHANGE UP (ref 43–77)
NRBC # BLD: SIGNIFICANT CHANGE UP /100 WBCS (ref 0–0)
PLATELET # BLD AUTO: 269 K/UL — SIGNIFICANT CHANGE UP (ref 150–400)
POTASSIUM SERPL-MCNC: 3.3 MMOL/L — LOW (ref 3.5–5.3)
POTASSIUM SERPL-SCNC: 3.3 MMOL/L — LOW (ref 3.5–5.3)
PROT SERPL-MCNC: 6.6 G/DL — SIGNIFICANT CHANGE UP (ref 6–8.3)
RBC # BLD: 3.64 M/UL — LOW (ref 3.8–5.2)
RBC # FLD: 17.9 % — HIGH (ref 10.3–14.5)
SODIUM SERPL-SCNC: 135 MMOL/L — SIGNIFICANT CHANGE UP (ref 135–145)
VANCOMYCIN TROUGH SERPL-MCNC: 11.3 UG/ML — SIGNIFICANT CHANGE UP (ref 10–20)
WBC # BLD: 6.36 K/UL — SIGNIFICANT CHANGE UP (ref 3.8–10.5)
WBC # FLD AUTO: 6.36 K/UL — SIGNIFICANT CHANGE UP (ref 3.8–10.5)

## 2023-08-27 PROCEDURE — 76000 FLUOROSCOPY <1 HR PHYS/QHP: CPT

## 2023-08-27 PROCEDURE — 81001 URINALYSIS AUTO W/SCOPE: CPT

## 2023-08-27 PROCEDURE — 86850 RBC ANTIBODY SCREEN: CPT

## 2023-08-27 PROCEDURE — 85730 THROMBOPLASTIN TIME PARTIAL: CPT

## 2023-08-27 PROCEDURE — 85027 COMPLETE CBC AUTOMATED: CPT

## 2023-08-27 PROCEDURE — 97161 PT EVAL LOW COMPLEX 20 MIN: CPT

## 2023-08-27 PROCEDURE — 87077 CULTURE AEROBIC IDENTIFY: CPT

## 2023-08-27 PROCEDURE — 71045 X-RAY EXAM CHEST 1 VIEW: CPT

## 2023-08-27 PROCEDURE — 80202 ASSAY OF VANCOMYCIN: CPT

## 2023-08-27 PROCEDURE — A9579: CPT

## 2023-08-27 PROCEDURE — 93970 EXTREMITY STUDY: CPT

## 2023-08-27 PROCEDURE — 86900 BLOOD TYPING SEROLOGIC ABO: CPT

## 2023-08-27 PROCEDURE — 93971 EXTREMITY STUDY: CPT

## 2023-08-27 PROCEDURE — 84100 ASSAY OF PHOSPHORUS: CPT

## 2023-08-27 PROCEDURE — 87086 URINE CULTURE/COLONY COUNT: CPT

## 2023-08-27 PROCEDURE — 82150 ASSAY OF AMYLASE: CPT

## 2023-08-27 PROCEDURE — 96365 THER/PROPH/DIAG IV INF INIT: CPT

## 2023-08-27 PROCEDURE — 74183 MRI ABD W/O CNTR FLWD CNTR: CPT

## 2023-08-27 PROCEDURE — 87641 MR-STAPH DNA AMP PROBE: CPT

## 2023-08-27 PROCEDURE — 93005 ELECTROCARDIOGRAM TRACING: CPT

## 2023-08-27 PROCEDURE — 80048 BASIC METABOLIC PNL TOTAL CA: CPT

## 2023-08-27 PROCEDURE — 80061 LIPID PANEL: CPT

## 2023-08-27 PROCEDURE — 36415 COLL VENOUS BLD VENIPUNCTURE: CPT

## 2023-08-27 PROCEDURE — G1004: CPT

## 2023-08-27 PROCEDURE — C1769: CPT

## 2023-08-27 PROCEDURE — C1773: CPT

## 2023-08-27 PROCEDURE — 83690 ASSAY OF LIPASE: CPT

## 2023-08-27 PROCEDURE — 99285 EMERGENCY DEPT VISIT HI MDM: CPT | Mod: 25

## 2023-08-27 PROCEDURE — 87640 STAPH A DNA AMP PROBE: CPT

## 2023-08-27 PROCEDURE — 88304 TISSUE EXAM BY PATHOLOGIST: CPT

## 2023-08-27 PROCEDURE — 83735 ASSAY OF MAGNESIUM: CPT

## 2023-08-27 PROCEDURE — 80053 COMPREHEN METABOLIC PANEL: CPT

## 2023-08-27 PROCEDURE — 74177 CT ABD & PELVIS W/CONTRAST: CPT | Mod: ME

## 2023-08-27 PROCEDURE — 76705 ECHO EXAM OF ABDOMEN: CPT

## 2023-08-27 PROCEDURE — 87507 IADNA-DNA/RNA PROBE TQ 12-25: CPT

## 2023-08-27 PROCEDURE — 87186 SC STD MICRODIL/AGAR DIL: CPT

## 2023-08-27 PROCEDURE — C1889: CPT

## 2023-08-27 PROCEDURE — S2900: CPT

## 2023-08-27 PROCEDURE — 86901 BLOOD TYPING SEROLOGIC RH(D): CPT

## 2023-08-27 PROCEDURE — 87040 BLOOD CULTURE FOR BACTERIA: CPT

## 2023-08-27 PROCEDURE — 85025 COMPLETE CBC W/AUTO DIFF WBC: CPT

## 2023-08-27 PROCEDURE — 85610 PROTHROMBIN TIME: CPT

## 2023-08-27 RX ORDER — LACTOBACILLUS ACIDOPHILUS 100MM CELL
1 CAPSULE ORAL
Qty: 0 | Refills: 0 | DISCHARGE
Start: 2023-08-27

## 2023-08-27 RX ORDER — POTASSIUM CHLORIDE 20 MEQ
40 PACKET (EA) ORAL ONCE
Refills: 0 | Status: COMPLETED | OUTPATIENT
Start: 2023-08-27 | End: 2023-08-27

## 2023-08-27 RX ORDER — OXYCODONE HYDROCHLORIDE 5 MG/1
1 TABLET ORAL
Qty: 10 | Refills: 0
Start: 2023-08-27

## 2023-08-27 RX ORDER — ACETAMINOPHEN 500 MG
2 TABLET ORAL
Qty: 30 | Refills: 0
Start: 2023-08-27

## 2023-08-27 RX ORDER — CEPHALEXIN 500 MG
1 CAPSULE ORAL
Qty: 28 | Refills: 0
Start: 2023-08-27 | End: 2023-09-02

## 2023-08-27 RX ORDER — DOCUSATE SODIUM 100 MG
1 CAPSULE ORAL
Qty: 90 | Refills: 0
Start: 2023-08-27

## 2023-08-27 RX ADMIN — PIPERACILLIN AND TAZOBACTAM 25 GRAM(S): 4; .5 INJECTION, POWDER, LYOPHILIZED, FOR SOLUTION INTRAVENOUS at 00:36

## 2023-08-27 RX ADMIN — PANTOPRAZOLE SODIUM 40 MILLIGRAM(S): 20 TABLET, DELAYED RELEASE ORAL at 05:56

## 2023-08-27 RX ADMIN — Medication 1 APPLICATION(S): at 05:56

## 2023-08-27 RX ADMIN — Medication 250 MILLIGRAM(S): at 00:35

## 2023-08-27 RX ADMIN — Medication 1 TABLET(S): at 12:13

## 2023-08-27 RX ADMIN — Medication 81 MILLIGRAM(S): at 12:14

## 2023-08-27 RX ADMIN — PIPERACILLIN AND TAZOBACTAM 25 GRAM(S): 4; .5 INJECTION, POWDER, LYOPHILIZED, FOR SOLUTION INTRAVENOUS at 07:51

## 2023-08-27 RX ADMIN — Medication 40 MILLIEQUIVALENT(S): at 10:01

## 2023-08-27 RX ADMIN — ENOXAPARIN SODIUM 40 MILLIGRAM(S): 100 INJECTION SUBCUTANEOUS at 07:51

## 2023-08-27 RX ADMIN — OXCARBAZEPINE 300 MILLIGRAM(S): 300 TABLET, FILM COATED ORAL at 05:56

## 2023-08-27 RX ADMIN — Medication 250 MILLIGRAM(S): at 12:10

## 2023-08-27 RX ADMIN — ESCITALOPRAM OXALATE 20 MILLIGRAM(S): 10 TABLET, FILM COATED ORAL at 12:14

## 2023-08-27 RX ADMIN — Medication 1000 UNIT(S): at 12:13

## 2023-08-27 NOTE — PROGRESS NOTE ADULT - PROBLEM SELECTOR PLAN 1
- Reg diet, IVF  - Pain mgmt prn   - OOB, ambulate, incentive wilbert   - Daily labs pending, trend LFTs  - Abx per ID   - Cont all care per primary team   - DVT Prophylaxis with LVX, ASA  - Will d/w attending     Surgical Team  Spectralink: [id number]

## 2023-08-27 NOTE — PROGRESS NOTE ADULT - SUBJECTIVE AND OBJECTIVE BOX
Patient is a 83y old  Female who presents with a chief complaint of cholecystitis, acute interstitial edematous pancreatitis.  Intrathoracic gastric fundal and body herniation (27 Aug 2023 07:43)    HPI:  84 y/o F w/ PMHx of HLD, osteoarthritis, GERD, depression/anxiety, varicose veins, B/L TKR  presents w/ sudden onset 10/10 upper abdominal pain since Sunday evening, pain radiation to bilateral lower back & bilateral shoulders. States  the pain was accompanied w/ nausea and several episodes of emesis that same night. She has had not appetite since then and has not eaten since Sunday night. Admits to chills, weakness, fatigue, nausea, abd bloating and several episodes of diarrhea since arrival to the ED. Denies headaches, chest pain, palpitations, shortness of breath, acid reflux, vomiting or urinary sx. Reports no past hx of pancreatitis. No hx of cancer.   Last colonoscopy was around 5 years ago, family hx of Crohn's - sister and granddaughter. Denies any recent alcohol use, drug abuse.     IN ED,  VITALS /69 HR 94 RR 20 Temp 98F on RA   PERTINENT LABS: Wbc 12k 82% predominant neutrophils, hb 11.3 RDW index 17.5   K 3.4 Glucose 106 AST 44 ALK po4 146  Lipase 2483  CT A/P : Mild, acute interstitial edematous pancreatitis in the pancreatic tail.  No discrete fluid collections. Edematous gallbladder wall and increased enhancement. The finding may be   secondary the presence of inflammation in the pancreas and  versus acute  cholecystitis. Recommend right upper quadrant abdominal ultrasound for  further evaluation and correlation.  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    Focus of air in the bladder. Correlate with instrumentation versus   infection. Correlate with urinalysis.    CXR : Intrathoracic gastric fundal and body herniation. Please see lower chest images of abdominal CT scan 8/22/2023. No airspace consolidation.    RUQ U/S Echogenicity in the region of the gallbladder neck suggests small stones versus gravel. Gallbladder wall thickening identified measuring 7-8 mm. Trace pericholecystic fluid. Correlate for cholecystitis.  s/ p  1X iv Tylenol zosyn, potassium 40mg X1,  2L NS in ER   EKG shows NSR 87 bpm QTc 462  (22 Aug 2023 20:04)    INTERVAL HPI:  8/23: Patient was seen and examined at bedside. They state they have been experiencing many loose BMs (>8 in the last 24 hours), loose BMs started prior to admission. Patient still has constant epigastric pain now at a 6/10. Patient denies HA, F/C, CP,SOB,Abd pain, dysuria, leg swelling, or leg pain. Going for MRCP today.  8/24: Patient was seen and examined at bedside. They state they have been having epigastric pain, radiating to the shoulder and back consistently still. Rated the pain as 8/10 at the worst, 6/10 when sleeping. She is continuing to have loose bowel movements, and states she had 4 loose bowel movements overnight with tenesmus. Patient states she has itching of her L lower extremity and trouble swallowing with a dry throat. Denies CP, SOB, dysuria, numbness and tingling of the lower extremities. Will be going for ERCP tomorrow. Cholecystectomy   Low H/H , On IV Abx   8/25: Patient seen and examined at bedside. She states she's been having constant loose BMs and has suprapubic pain with increased frequency. Itching of her L lower extremity has improved with lac-hydrin lotion. Denies cp, sob, numbness/tingling of LE. Plan for ERCP and cholecystectomy today. Stable H/H, on IV abx, Hypo K -replaced   8/26: POD #1 s/p Robotic virgil and ERCP.  Patient seen and examined at bedside. She states pain is well controlled with 7-8 at its highest. The patient does not remember if she passed a Bowel movement but did see feces on her diaper post surgery, she denies passing flatus. Itching of her L lower extremity has improved with lac-hydrin lotion. Denies F/C, N/V, CP, SOB, Leg swelling or leg pain. ON IV Vanco & Zosyn   8/27: POD #2 s/p Robotic virgil and ERCP.  Patient seen and examined at bedside. She states pain is well controlled with 7 at its highest. The patient endorses passing flatus but has not passed a BM. Itching of her L lower extremity has improved with lac-hydrin lotion. Denies F/C, N/V, CP, SOB, Leg swelling or leg pain. ON IV Vanco & Zosyn     OVERNIGHT EVENTS: None    Home Medications:  celecoxib 200 mg oral capsule: 1 cap(s) orally every 12 hours (22 Aug 2023 19:52)  Crestor 40 mg oral tablet: 1 tab(s) orally once a day (at bedtime) (22 Aug 2023 19:52)  Ecotrin Adult Low Strength 81 mg oral delayed release tablet: 1 tab(s) orally every 12 hours (22 Aug 2023 19:52)  Lexapro 20 mg oral tablet: 1 tab(s) orally once a day (22 Aug 2023 19:52)  pantoprazole 40 mg oral delayed release tablet: 1 tab(s) orally once a day (before a meal) (22 Aug 2023 19:52)  Trileptal 300 mg oral tablet: 1 tab(s) orally 2 times a day (22 Aug 2023 19:52)  Vitamin D3 1000 intl units (25 mcg) oral tablet: 1 tab(s) orally once a day (22 Aug 2023 19:52)      MEDICATIONS  (STANDING):  ammonium lactate 12% Lotion 1 Application(s) Topical two times a day  aspirin enteric coated 81 milliGRAM(s) Oral daily  atorvastatin 80 milliGRAM(s) Oral at bedtime  cholecalciferol 1000 Unit(s) Oral daily  enoxaparin Injectable 40 milliGRAM(s) SubCutaneous every 24 hours  escitalopram 20 milliGRAM(s) Oral daily  lactobacillus acidophilus 1 Tablet(s) Oral daily  OXcarbazepine 300 milliGRAM(s) Oral two times a day  pantoprazole    Tablet 40 milliGRAM(s) Oral before breakfast  piperacillin/tazobactam IVPB.. 3.375 Gram(s) IV Intermittent every 8 hours  vancomycin  IVPB 750 milliGRAM(s) IV Intermittent every 12 hours    MEDICATIONS  (PRN):  acetaminophen     Tablet .. 650 milliGRAM(s) Oral every 6 hours PRN Temp greater or equal to 38C (100.4F), Mild Pain (1 - 3)  melatonin 3 milliGRAM(s) Oral at bedtime PRN Insomnia  morphine  - Injectable 0.5 milliGRAM(s) IV Push every 4 hours PRN Mild Pain (1 - 3)  morphine  - Injectable 1 milliGRAM(s) IV Push every 4 hours PRN Moderate Pain (4 - 6)  morphine  - Injectable 2 milliGRAM(s) IV Push every 4 hours PRN Severe Pain (7 - 10)  ondansetron Injectable 4 milliGRAM(s) IV Push every 8 hours PRN Nausea and/or Vomiting      Allergies    adhesives (Pruritus; Rash)  No Known Drug Allergies    Intolerances    lactose (Diarrhea)      Social History:  Tobacco:  denies  EtOH: denies   Recreational drug use: denies   Lives with: family   Ambulates: with walker (22 Aug 2023 20:04)      REVIEW OF SYSTEMS:  CONSTITUTIONAL: No fever, No chills, No fatigue, No myalgia, No Body ache, No Weakness  EYES: No eye pain,  No visual disturbances, No discharge, NO Redness  ENMT:  No ear pain, No nose bleed, No vertigo; No sinus pain, NO throat pain, No Congestion  NECK: No pain, No stiffness  RESPIRATORY: No cough, NO wheezing, No  hemoptysis, NO  shortness of breath  CARDIOVASCULAR: No chest pain, palpitations  GASTROINTESTINAL: + abdominal pain, NO epigastric pain. No nausea, No vomiting; No diarrhea, No constipation. [  ] BM  GENITOURINARY: No dysuria, No frequency, No urgency, No hematuria, NO incontinence  NEUROLOGICAL: No headaches, No dizziness, No numbness, No tingling, No tremors, No weakness  EXT: No Swelling, No Pain, No Edema  SKIN:  [ x ] + itching of LLE , no burning, rashes, or lesions   MUSCULOSKELETAL: No joint pain ,No Jt swelling; No muscle pain, No back pain, No extremity pain  PSYCHIATRIC: No depression,  + anxiety,  No mood swings ,No difficulty sleeping at night  PAIN SCALE: [  ] None  [ x ] Other- 7/10  REST OF REVIEW Of SYSTEM - [ x ] Normal     Vital Signs Last 24 Hrs  T(C): 36.7 (27 Aug 2023 05:00), Max: 36.8 (26 Aug 2023 20:06)  T(F): 98.1 (27 Aug 2023 05:00), Max: 98.2 (26 Aug 2023 20:06)  HR: 99 (27 Aug 2023 05:00) (74 - 99)  BP: 114/69 (27 Aug 2023 05:00) (101/67 - 114/69)  BP(mean): --  RR: 18 (27 Aug 2023 05:00) (16 - 18)  SpO2: 91% (27 Aug 2023 05:00) (91% - 95%)    Parameters below as of 27 Aug 2023 05:00  Patient On (Oxygen Delivery Method): room air      Finger Stick        08-26 @ 07:01  -  08-27 @ 07:00  --------------------------------------------------------  IN: 350 mL / OUT: 0 mL / NET: 350 mL        PHYSICAL EXAM:  GENERAL:  [ x ] NAD , [ x ] well appearing, [  ] Agitated, [  ] Drowsy,  [  ] Lethargy, [  ] confused   HEAD:  [ x ] Normal, [  ] Other  EYES:  [  ] EOMI, [  ] PERRLA, [ x ] conjunctiva and sclera clear normal, [  ] Other,  [  ] Pallor,[  ] Discharge  ENMT:  [ x ] Normal, [ x ] Moist mucous membranes, [ x ] Good dentition, [x ] No Thrush  NECK:  [  ] Supple, [  x] No JVD, [ x ] Normal thyroid, [  ] Lymphadenopathy [  ] Other  CHEST/LUNG:  [ x ] Clear to auscultation bilaterally, [ x ] Breath Sounds equal B/L / Decrease, [ x ] poor effort  [ x ] No rales, [ x ] No rhonchi  [ x ]  No wheezing,   HEART:  [ x ] Regular rate and rhythm, [  ] tachycardia, [  ] Bradycardia,  [  ] irregular  [ x ] No murmurs, No rubs, No gallops, [  ] PPM in place (Mfr:  )  ABDOMEN:  [ x ] Soft, [ x ] + tender in LLQ and RLQ, No R/R/G  [ x ] Nondistended, [ x ] No mass, [x  ] Bowel sounds present, [  ] obese [x] dressings are clean dry and intact at Sx site   NERVOUS SYSTEM:  [ x ] Alert & Oriented X3, x  ] Nonfocal  [  ] Confusion  [  ] Encephalopathic [  ] Sedated [  ] Unable to assess, [  ] Dementia [  ] Other-  EXTREMITIES: [ x ] 2+ Peripheral Pulses, No clubbing, No cyanosis,  [  ] edema B/L lower EXT. [  ] PVD stasis skin changes B/L Lower EXT, [  ] wound  LYMPH: No lymphadenopathy noted  SKIN:  [ x ] Left lower 1/3 anterior tibia rash  has significantly less erythema mild area of erythema left Post calf ? Phlebitis  , [  ] Pressure Ulcers, [  ] ecchymosis, [  ] Skin Tears, [  ] Other    DIET: Diet, Regular:   Low Fat (LOWFAT) (08-26-23 @ 11:13)      LABS:                        9.4    6.36  )-----------( 269      ( 27 Aug 2023 08:08 )             28.5       Ca    8.7        26 Aug 2023 06:20        Urinalysis Basic - ( 26 Aug 2023 06:20 )    Color: x / Appearance: x / SG: x / pH: x  Gluc: 111 mg/dL / Ketone: x  / Bili: x / Urobili: x   Blood: x / Protein: x / Nitrite: x   Leuk Esterase: x / RBC: x / WBC x   Sq Epi: x / Non Sq Epi: x / Bacteria: x        Culture Results:   50,000 - 99,000 CFU/mL Escherichia coli (08-23 @ 06:00)  Culture Results:   No growth at 4 days (08-22 @ 22:22)  Culture Results:   No growth at 4 days (08-22 @ 21:50)                  Culture - Urine (collected 23 Aug 2023 06:00)  Source: Clean Catch Clean Catch (Midstream)  Final Report (25 Aug 2023 15:43):    50,000 - 99,000 CFU/mL Escherichia coli  Organism: Escherichia coli (25 Aug 2023 15:43)  Organism: Escherichia coli (25 Aug 2023 15:43)    Culture - Blood (collected 22 Aug 2023 22:22)  Source: .Blood Blood-Peripheral  Preliminary Report (27 Aug 2023 02:01):    No growth at 4 days    Culture - Blood (collected 22 Aug 2023 21:50)  Source: .Blood Blood-Peripheral  Preliminary Report (27 Aug 2023 02:01):    No growth at 4 days         Anemia Panel:      Thyroid Panel:        Lipase: 271 U/L (08-23-23 @ 08:06)  Amylase: 128 U/L (08-23-23 @ 08:06)  Lipase: 2483 U/L (08-22-23 @ 09:58)          RADIOLOGY & ADDITIONAL TESTS:      HEALTH ISSUES - PROBLEM Dx:  Pancreatitis    Cholecystitis    Left leg cellulitis    Anxiety and depression    Abnormal finding on CT scan    Need for prophylactic measure    Abdominal pain    Diarrhea    Anemia            Consultant(s) Notes Reviewed:  [ X ] YES     Care Discussed with [X] Consultants  [ X ] Patient  [  ] Family [  ] HCP [  ]   [  ] Social Service  [  ] RN, [  ] Physical Therapy,[  ] Palliative care team  DVT PPX: [ X ] Lovenox, [  ] S C Heparin, [  ] Coumadin, [  ] Xarelto, [  ] Eliquis, [  ] Pradaxa, [  ] IV Heparin drip, [  ] SCD [  ] Contraindication 2 to GI Bleed,[  ] Ambulation [  ] Contraindicated 2 to  bleed [  ] Contraindicated 2 to Brain Bleed  Advanced directive: [  X] None, [  ] DNR/DNI

## 2023-08-27 NOTE — PROGRESS NOTE ADULT - SUBJECTIVE AND OBJECTIVE BOX
[facility]  Daren Quach PA-C  [address] [place] [address]  [phone]      INTERVAL HPI/OVERNIGHT EVENTS:  Pt s/e   still having some abdominal pain w/ movement  reports decreased appetite; no n/v        MEDICATIONS  (STANDING):  ammonium lactate 12% Lotion 1 Application(s) Topical two times a day  aspirin enteric coated 81 milliGRAM(s) Oral daily  atorvastatin 80 milliGRAM(s) Oral at bedtime  cholecalciferol 1000 Unit(s) Oral daily  enoxaparin Injectable 40 milliGRAM(s) SubCutaneous every 24 hours  escitalopram 20 milliGRAM(s) Oral daily  lactobacillus acidophilus 1 Tablet(s) Oral daily  OXcarbazepine 300 milliGRAM(s) Oral two times a day  pantoprazole    Tablet 40 milliGRAM(s) Oral before breakfast  piperacillin/tazobactam IVPB.. 3.375 Gram(s) IV Intermittent every 8 hours  vancomycin  IVPB 750 milliGRAM(s) IV Intermittent every 12 hours    MEDICATIONS  (PRN):  acetaminophen     Tablet .. 650 milliGRAM(s) Oral every 6 hours PRN Temp greater or equal to 38C (100.4F), Mild Pain (1 - 3)  melatonin 3 milliGRAM(s) Oral at bedtime PRN Insomnia  morphine  - Injectable 0.5 milliGRAM(s) IV Push every 4 hours PRN Mild Pain (1 - 3)  morphine  - Injectable 1 milliGRAM(s) IV Push every 4 hours PRN Moderate Pain (4 - 6)  morphine  - Injectable 2 milliGRAM(s) IV Push every 4 hours PRN Severe Pain (7 - 10)  ondansetron Injectable 4 milliGRAM(s) IV Push every 8 hours PRN Nausea and/or Vomiting      Allergies    adhesives (Pruritus; Rash)  No Known Drug Allergies    Intolerances    lactose (Diarrhea)        PHYSICAL EXAM  Vital Signs Last 24 Hrs  T(C): 36.7 (27 Aug 2023 05:00), Max: 36.8 (26 Aug 2023 20:06)  T(F): 98.1 (27 Aug 2023 05:00), Max: 98.2 (26 Aug 2023 20:06)  HR: 99 (27 Aug 2023 05:00) (74 - 99)  BP: 114/69 (27 Aug 2023 05:00) (101/67 - 114/69)  BP(mean): --  RR: 18 (27 Aug 2023 05:00) (16 - 18)  SpO2: 91% (27 Aug 2023 05:00) (91% - 95%)    Parameters below as of 27 Aug 2023 05:00  Patient On (Oxygen Delivery Method): room air                GENERAL:  Appears stated age  HEENT:  NC/AT  CHEST:  Full & symmetric excursion  HEART:  Regular rhythm  ABDOMEN:  Soft, non-tender, non-distended  EXTEREMITIES:  no cyanosis  SKIN:  No rash  NEURO:  Alert          LABS:                        9.4    6.36  )-----------( 269      ( 27 Aug 2023 08:08 )             28.5     08-27    135  |  100  |  9   ----------------------------<  109<H>  3.3<L>   |  28  |  0.57    Ca    8.5      27 Aug 2023 08:08  Phos  3.2     08-26  Mg     2.1     08-26    TPro  6.6  /  Alb  2.5<L>  /  TBili  0.2  /  DBili  x   /  AST  66<H>  /  ALT  46  /  AlkPhos  118  08-27 08-26-23 @ 07:01  -  08-27-23 @ 07:00  --------------------------------------------------------  IN: 350 mL / OUT: 0 mL / NET: 350 mL

## 2023-08-27 NOTE — PROGRESS NOTE ADULT - PROBLEM SELECTOR PLAN 3
Multifactorial etiology with AC Shama & AC Pancreatitis with IV Hydration & Daily Lab Draws   CBC to follow -No Bleeding.

## 2023-08-27 NOTE — PROGRESS NOTE ADULT - NUTRITIONAL ASSESSMENT
This patient has been assessed with a concern for Malnutrition and has been determined to have a diagnosis/diagnoses of Mild protein-calorie malnutrition.    This patient is being managed with:   Diet Clear Liquid-  Entered: Aug 25 2023  6:55AM  
This patient has been assessed with a concern for Malnutrition and has been determined to have a diagnosis/diagnoses of Mild protein-calorie malnutrition.    This patient is being managed with:   Diet NPO after Midnight-     NPO Start Date: 24-Aug-2023   NPO Start Time: 23:59  Except Medications  Entered: Aug 24 2023  9:16AM  
This patient has been assessed with a concern for Malnutrition and has been determined to have a diagnosis/diagnoses of Mild protein-calorie malnutrition.    This patient is being managed with:   Diet Regular-  Low Fat (LOWFAT)  Entered: Aug 26 2023 11:13AM

## 2023-08-27 NOTE — PROGRESS NOTE ADULT - SUBJECTIVE AND OBJECTIVE BOX
Pt observed and evaluated at the bedside   C/o incisional pain only with coughing or movement   Tolerating diet without N/V, abd pain   Has not been OOB and ambulating much 2/t pain   Denies chest pain, SOB, palpitations      T(C): 36.7 (08-27-23 @ 05:00), Max: 36.8 (08-26-23 @ 20:06)  HR: 99 (08-27-23 @ 05:00) (74 - 99)  BP: 114/69 (08-27-23 @ 05:00) (101/67 - 114/69)  RR: 18 (08-27-23 @ 05:00) (16 - 18)  SpO2: 91% (08-27-23 @ 05:00) (91% - 95%)    PHYSICAL EXAM:  General: no acute distress, appears comfortable  HEENT: normocephalic, anicteric  Pulm: non labored respirations  Abdomen: soft, nontender, nondistended, incisions c/d     I&O's Detail    26 Aug 2023 07:01  -  27 Aug 2023 07:00  --------------------------------------------------------  IN:    IV PiggyBack: 100 mL    IV PiggyBack: 250 mL  Total IN: 350 mL    OUT:  Total OUT: 0 mL  Total NET: 350 mL    LABS:                        9.5    8.13  )-----------( 266      ( 26 Aug 2023 06:20 )             29.9     08-26    140  |  106  |  9   ----------------------------<  111<H>  4.2   |  27  |  0.43<L>    Ca    8.7      26 Aug 2023 06:20  Phos  3.2     08-26  Mg     2.1     08-26    TPro  6.5  /  Alb  2.4<L>  /  TBili  0.4  /  DBili  x   /  AST  75<H>  /  ALT  52  /  AlkPhos  145<H>  08-26    Urinalysis Basic - ( 26 Aug 2023 06:20 )    Color: x / Appearance: x / SG: x / pH: x  Gluc: 111 mg/dL / Ketone: x  / Bili: x / Urobili: x   Blood: x / Protein: x / Nitrite: x   Leuk Esterase: x / RBC: x / WBC x   Sq Epi: x / Non Sq Epi: x / Bacteria: x    MEDICATIONS:  acetaminophen     Tablet .. 650 milliGRAM(s) Oral every 6 hours PRN  ammonium lactate 12% Lotion 1 Application(s) Topical two times a day  aspirin enteric coated 81 milliGRAM(s) Oral daily  atorvastatin 80 milliGRAM(s) Oral at bedtime  cholecalciferol 1000 Unit(s) Oral daily  enoxaparin Injectable 40 milliGRAM(s) SubCutaneous every 24 hours  escitalopram 20 milliGRAM(s) Oral daily  lactobacillus acidophilus 1 Tablet(s) Oral daily  melatonin 3 milliGRAM(s) Oral at bedtime PRN  morphine  - Injectable 0.5 milliGRAM(s) IV Push every 4 hours PRN  morphine  - Injectable 1 milliGRAM(s) IV Push every 4 hours PRN  morphine  - Injectable 2 milliGRAM(s) IV Push every 4 hours PRN  ondansetron Injectable 4 milliGRAM(s) IV Push every 8 hours PRN  OXcarbazepine 300 milliGRAM(s) Oral two times a day  pantoprazole    Tablet 40 milliGRAM(s) Oral before breakfast  piperacillin/tazobactam IVPB.. 3.375 Gram(s) IV Intermittent every 8 hours  vancomycin  IVPB 750 milliGRAM(s) IV Intermittent every 12 hours   Pt observed and evaluated at the bedside   C/o incisional pain only with coughing or movement   Tolerating diet without N/V, abd pain   +F, no loose BMs since yesterday, +voiding   Has not been OOB and ambulating much 2/t pain   Denies chest pain, SOB, palpitations      T(C): 36.7 (08-27-23 @ 05:00), Max: 36.8 (08-26-23 @ 20:06)  HR: 99 (08-27-23 @ 05:00) (74 - 99)  BP: 114/69 (08-27-23 @ 05:00) (101/67 - 114/69)  RR: 18 (08-27-23 @ 05:00) (16 - 18)  SpO2: 91% (08-27-23 @ 05:00) (91% - 95%)    PHYSICAL EXAM:  General: no acute distress, appears comfortable  HEENT: normocephalic, anicteric  Pulm: non labored respirations  Abdomen: soft, nontender, nondistended, incisions c/d     I&O's Detail    26 Aug 2023 07:01  -  27 Aug 2023 07:00  --------------------------------------------------------  IN:    IV PiggyBack: 100 mL    IV PiggyBack: 250 mL  Total IN: 350 mL    OUT:  Total OUT: 0 mL  Total NET: 350 mL    LABS:                        9.5    8.13  )-----------( 266      ( 26 Aug 2023 06:20 )             29.9     08-26    140  |  106  |  9   ----------------------------<  111<H>  4.2   |  27  |  0.43<L>    Ca    8.7      26 Aug 2023 06:20  Phos  3.2     08-26  Mg     2.1     08-26    TPro  6.5  /  Alb  2.4<L>  /  TBili  0.4  /  DBili  x   /  AST  75<H>  /  ALT  52  /  AlkPhos  145<H>  08-26    Urinalysis Basic - ( 26 Aug 2023 06:20 )    Color: x / Appearance: x / SG: x / pH: x  Gluc: 111 mg/dL / Ketone: x  / Bili: x / Urobili: x   Blood: x / Protein: x / Nitrite: x   Leuk Esterase: x / RBC: x / WBC x   Sq Epi: x / Non Sq Epi: x / Bacteria: x    MEDICATIONS:  acetaminophen     Tablet .. 650 milliGRAM(s) Oral every 6 hours PRN  ammonium lactate 12% Lotion 1 Application(s) Topical two times a day  aspirin enteric coated 81 milliGRAM(s) Oral daily  atorvastatin 80 milliGRAM(s) Oral at bedtime  cholecalciferol 1000 Unit(s) Oral daily  enoxaparin Injectable 40 milliGRAM(s) SubCutaneous every 24 hours  escitalopram 20 milliGRAM(s) Oral daily  lactobacillus acidophilus 1 Tablet(s) Oral daily  melatonin 3 milliGRAM(s) Oral at bedtime PRN  morphine  - Injectable 0.5 milliGRAM(s) IV Push every 4 hours PRN  morphine  - Injectable 1 milliGRAM(s) IV Push every 4 hours PRN  morphine  - Injectable 2 milliGRAM(s) IV Push every 4 hours PRN  ondansetron Injectable 4 milliGRAM(s) IV Push every 8 hours PRN  OXcarbazepine 300 milliGRAM(s) Oral two times a day  pantoprazole    Tablet 40 milliGRAM(s) Oral before breakfast  piperacillin/tazobactam IVPB.. 3.375 Gram(s) IV Intermittent every 8 hours  vancomycin  IVPB 750 milliGRAM(s) IV Intermittent every 12 hours

## 2023-08-27 NOTE — PROGRESS NOTE ADULT - PROBLEM SELECTOR PLAN 8
VTE pro: Lovenox 40mg qd
VTE pro: Lovenox 40mg qd  - will hold ASA and Lovenox prior to procedure
VTE pro: Lovenox 40mg qd  - will hold ASA and Lovenox prior to procedure

## 2023-08-27 NOTE — PROGRESS NOTE ADULT - PROVIDER SPECIALTY LIST ADULT
Gastroenterology
Gastroenterology
Infectious Disease
Internal Medicine
Gastroenterology
Infectious Disease
Surgery
Vascular Surgery
Gastroenterology
Infectious Disease
Surgery
Infectious Disease
Surgery
Surgery
Internal Medicine

## 2023-08-27 NOTE — CASE MANAGEMENT PROGRESS NOTE - NSCMPROGRESSNOTE_GEN_ALL_CORE
Patient is for possible transition home today if she tolerates her diet, no skilled needs. IMM discharge notification given and signed. Patient's  Emersno and their daughter will drive her back home.

## 2023-08-27 NOTE — PROGRESS NOTE ADULT - PROBLEM SELECTOR PLAN 4
Patient c/o of diarrhea w/ suprapubic pain and associated increased urinary frequency  - GI PCR --> +EPEC  - s/p 1000mg x1 Azithromycin  - IV vancomycin, c/w IV zosyn,   - Discontinue meropenem  - Replete electrolytes PRN  - ID (Dr. Kwok) following, f/u recs    #Hypokalemia - resolved  f/u cmp    #hypophosphatemia - resolved Patient c/o of diarrhea w/ suprapubic pain and associated increased urinary frequency  - GI PCR --> +EPEC  - s/p 1000mg x1 Azithromycin  - IV vancomycin, c/w IV zosyn,   - Discontinue meropenem  - Replete electrolytes PRN  - ID (Dr. Kwok) following, f/u recs    #Hypokalemia - resolved  - s/p 1x 40meq  KCL  f/u cmp    #hypophosphatemia - resolved

## 2023-08-27 NOTE — CHART NOTE - NSCHARTNOTEFT_GEN_A_CORE
Do you have Advance Directives (HCP / LV / Organ donation / Documentation of oral advance Directive):   (   x )  yes    (      )    NO                                                                            Do you have LV - Living will :                                                                                                                                             ( x   )  yes    (      )   No    Do you have HCP - Health Care Proxy:                                                                                                                            ( x    )  yes   (       ) N0    Do you have DNR- Do Not Resuscitate :                                                                                                                           (      )  yes  (    x    )  No    Do you have DNI- Do Not intubate  :                                                                                                                               (      )  yes   (   x    ) No    Do you have MOLST - Medical orders for Life sustaining treatment  :                                                                    (      ) yes    (    x   ) No    Decision Maker :  (   x  ) Patient     (      )  [facility]CA   (     ) Public Health Law Surrogate     (      ) Surrogate  (       ) Guardian    Goals of Care :  (    x  )   Complete Care     (       ) No Limitations                              (       )   Comfort Care       (       )  Hospice                               (      )   Limited medical Intervention / s    Medical Interventions :   (     x   )   CPR       (        )  DNR                                               (    x    )  Intubation with MV - Mechanical Ventilation  (    x  ) BIPAP/CPAP    (         )   DNI                                               (    x     )  Artificial Nutrition -  IVF, TPN / PPN, Tube Feeds             (         )   No Feeding Tube                                                (     x   ) Use Antibiotics                         (          ) No Antibiotics                                                (     x    ) Blood and Blood Products     (         )   No Blood or Blood products                                                (     x     )  Dialysis                                    (         )  No Dialysis                                                (          )  Medical Management only  (         )  No Invasive Interventions or Surgery  Time spent :                        (    x   ) upto 30 minutes                       (           )   more than 30 minutes  ACP reviewed and discussed

## 2023-08-27 NOTE — PROGRESS NOTE ADULT - SUBJECTIVE AND OBJECTIVE BOX
Covering [facility] DIVISION of INFECTIOUS DISEASE  DIALLO Stevens, JANETTE Leon G. Casimir BERG HOMAR is a 83yFemale , patient examined and chart reviewed.       INTERVAL HPI/ OVERNIGHT EVENTS:   No events. s/p robotic assisted laparoscopic cholecystectomy, ERCP POD#2  Tolerated lunch.    PAST MEDICAL & SURGICAL HISTORY:  Anxiety  Depression  Hyperlipidemia  GERD (gastroesophageal reflux disease)  History of vascular disease  venous; has swelling  Osteoarthritis of left knee  HLD (hyperlipidemia)  S/P foot surgery, right  right great toe surgery  S/P wrist surgery  right  S/P tonsillectomy  S/P cataract surgery  bilateral  S/P total knee replacement, left      For details regarding the patient's social history, family history, and other miscellaneous elements, please refer the initial infectious diseases consultation and/or the admitting history and physical examination for this admission.      ROS:  CONSTITUTIONAL:  Negative fever or chill  EYES:  Negative  blurry vision or double vision  CARDIOVASCULAR:  Negative for chest pain or palpitations  RESPIRATORY:  Negative for cough, wheezing, or SOB   GASTROINTESTINAL:  Negative for nausea, vomiting, diarrhea, constipation, or abdominal pain  GENITOURINARY:  Negative frequency, urgency or dysuria  NEUROLOGIC:  No headache, confusion, dizziness, lightheadedness  All other systems were reviewed and are negative     ALLERGIES  lactose (Diarrhea)  adhesives (Pruritus; Rash)  No Known Drug Allergies      Current inpatient medications :    ANTIBIOTICS/RELEVANT:  lactobacillus acidophilus 1 Tablet(s) Oral daily  piperacillin/tazobactam IVPB.. 3.375 Gram(s) IV Intermittent every 8 hours  vancomycin  IVPB 750 milliGRAM(s) IV Intermittent every 12 hours      acetaminophen     Tablet .. 650 milliGRAM(s) Oral every 6 hours PRN  ammonium lactate 12% Lotion 1 Application(s) Topical two times a day  aspirin enteric coated 81 milliGRAM(s) Oral daily  atorvastatin 80 milliGRAM(s) Oral at bedtime  cholecalciferol 1000 Unit(s) Oral daily  enoxaparin Injectable 40 milliGRAM(s) SubCutaneous every 24 hours  escitalopram 20 milliGRAM(s) Oral daily  melatonin 3 milliGRAM(s) Oral at bedtime PRN  morphine  - Injectable 0.5 milliGRAM(s) IV Push every 4 hours PRN  morphine  - Injectable 1 milliGRAM(s) IV Push every 4 hours PRN  morphine  - Injectable 2 milliGRAM(s) IV Push every 4 hours PRN  ondansetron Injectable 4 milliGRAM(s) IV Push every 8 hours PRN  OXcarbazepine 300 milliGRAM(s) Oral two times a day  pantoprazole    Tablet 40 milliGRAM(s) Oral before breakfast      Objective:    Vital Signs Last 24 Hrs  T(C): 37.2 (27 Aug 2023 12:16), Max: 37.2 (27 Aug 2023 12:16)  T(F): 99 (27 Aug 2023 12:16), Max: 99 (27 Aug 2023 12:16)  HR: 94 (27 Aug 2023 12:16) (87 - 99)  BP: 125/78 (27 Aug 2023 12:16) (101/67 - 125/78)  RR: 18 (27 Aug 2023 12:16) (16 - 18)  SpO2: 91% (27 Aug 2023 12:16) (91% - 95%)    Parameters below as of 27 Aug 2023 12:16  Patient On (Oxygen Delivery Method): room air      Physical Exam:  General: no acute distress  Neck: supple, trachea midline  Lungs: clear, no wheeze/rhonchi  Cardiovascular: regular rate and rhythm, S1 S2  Abdomen: soft, nontender,  bowel sounds normal  Neurological: alert and oriented x3  Skin: no rash  Extremities: Left LE decreased swelling resolved erythema        LABS:                        9.4    6.36  )-----------( 269      ( 27 Aug 2023 08:08 )             28.5   08-27    135  |  100  |  9   ----------------------------<  109<H>  3.3<L>   |  28  |  0.57    Ca    8.5      27 Aug 2023 08:08  Phos  3.2     08-26  Mg     2.1     08-26    TPro  6.6  /  Alb  2.5<L>  /  TBili  0.2  /  DBili  x   /  AST  66<H>  /  ALT  46  /  AlkPhos  118  08-27      PT/INR - ( 25 Aug 2023 06:02 )   PT: 12.1 sec;   INR: 1.04 ratio         PTT - ( 25 Aug 2023 06:02 )  PTT:30.8 sec  Urinalysis Basic - ( 26 Aug 2023 06:20 )    Color: x / Appearance: x / SG: x / pH: x  Gluc: 111 mg/dL / Ketone: x  / Bili: x / Urobili: x   Blood: x / Protein: x / Nitrite: x   Leuk Esterase: x / RBC: x / WBC x   Sq Epi: x / Non Sq Epi: x / Bacteria: x      Vancomycin Level, Trough: 5.2 ug/mL (08-25 @ 21:13)    MICROBIOLOGY:                  RADIOLOGY & ADDITIONAL STUDIES:          Assessment :  84 y/o F w/ PMHx of HLD, osteoarthritis, GERD, depression/anxiety, varicose veins ,Left  TKR  presents w/ sudden onset 10/10 upper abdominal pain since Sunday evening, pain radiation to bilateral lower back & bilateral shoulders. CT A/P : Mild, acute interstitial edematous pancreatitis in the pancreatic tail. CXR : Intrathoracic gastric fundal and body herniation ..RUQ U/S Echogenicity in the region of the gallbladder neck suggests small stones versus gravel. Gallbladder wall thickening identified measuring 7-8 mm. Trace pericholecystic fluid. Correlate for cholecystitis. Admitted for Gall stone  edematous pancreatitis & AC  Cholecystitis, Cholelithiasis  and Intrathoracic gastric fundal and body herniation    Acute Abdominal Pain 2/2 Gallstone Pancreatitis/Cholecystitis  LLE Cellulitis  - CT A/P : Mild, acute interstitial edematous pancreatitis in the pancreatic tail. .  - RUQ U/S Echogenicity in the region of the gallbladder neck suggests small stones versus gravel. Gallbladder wall thickening identified measuring 7-8 mm. Trace pericholecystic fluid. Correlate for cholecystitis.  - elevated LFTs, lipase  - MRCP Acute cholecystitis. Mild pancreatitis. Cystic focus of the pancreatic body measuring up to 8 mm, image 23 series 3 may represent IPMN or sequelae of pancreatitis.  - BCx NGTD  - GI PCR + EPEC  - UCx. E.coli  - POD#2 s/p robotic assisted laparoscopic cholecystectomy, ERCP  - wbc wnl  - tolerated lunch - ok to go home per surgery - no need for antibiotic post lap virgil per surgery    Plan:  On vancomycin zosyn   Can change to po Keflex 500mg q6h x 7 days for leg cellulitis  Sp azithromycin 1000mg x1 dose for EPEC  Trend temps/WBC  Trend LFTs/lipase  Serial abdominal exams  stable from ID standpoint  Dc home to primary team    D/w Dr Viveros and Dr Recinos    Continue with present regiment.  Appropriate use of antibiotics and adverse effects reviewed.      I have discussed the above plan of care with patient in detail. She expressed understanding of the  treatment plan . Risks, benefits and alternatives discussed in detail. I have asked if she has any questions or concerns and appropriately addressed them to the best of my ability .    > 35 minutes were spent in direct patient care reviewing notes, medications ,labs data/ imaging , discussion with multidisciplinary team.    Thank you for allowing me to participate in care of your patient .    Cora Felix MD  Infectious Disease  [phone]

## 2023-08-27 NOTE — DISCHARGE NOTE NURSING/CASE MANAGEMENT/SOCIAL WORK - PATIENT PORTAL LINK FT
You can access the FollowMyHealth Patient Portal offered by [facility] by registering at the following website: [email or web address]. By joining [employer]’s FollowMyHealth portal, you will also be able to view your health information using other applications (apps) compatible with our system.

## 2023-08-27 NOTE — PROGRESS NOTE ADULT - PROBLEM SELECTOR PLAN 7
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 Rubio)- NEG DVT - No Vascular intervention   - No acute intervention at this time

## 2023-08-27 NOTE — PROGRESS NOTE ADULT - PROBLEM SELECTOR PLAN 2
- S/P  abdominal pain, n/v,/ d + lipase - 2483 l  -  acute pancreatitis.- -Gall stone Pancreatitis   - CT A/P : Mild, acute interstitial edematous pancreatitis in the pancreatic tail. .  - RUQ U/S Echogenicity in the region of the gallbladder neck suggests small stones versus gravel. Gallbladder wall thickening identified measuring 7-8 mm. Trace pericholecystic fluid. Correlate for cholecystitis.  -s/p POD#1 robotic virgil and ERCP  - GI (Dr Mandujano), consulted   -On IV Abx as per ID- IV Zosyn, IV vancomycin

## 2023-08-27 NOTE — PROGRESS NOTE ADULT - PROBLEM SELECTOR PLAN 5
- Patient presents with mild erythema LL Ext ,+ itching and tender to touch  likely 2/2 LLE cellulitis  - Given 1 x dose IV zosyn , 2L NS Bolus in the ED  - c/w IV vancomycin and zosyn  - Bcx and Ucx, NGTD  - MRSA PCR negative   - Lower extremity doppler us negative  - WBC downtrending  - ID Dr. AMBER Kwok d/w

## 2023-08-27 NOTE — PROGRESS NOTE ADULT - PROBLEM SELECTOR PLAN 1
Ac Cholecystitis with Cholelithiasis   -- RUQ U/S Echogenicity in the region of the gallbladder neck suggests small stones versus gravel. Gallbladder wall thickening identified measuring 7-8 mm. Trace pericholecystic fluid. Correlate for cholecystitis.  - s/p POD#2 robotic virgil and ERCP  -On IV zosyn, and Vancomycin  -On Low fat diet  Surgery (Dr xavier)  consulted  GI(Dr Mandujano) consulted  Pain meds prn    Patient is medically optimized for procedure

## 2023-08-27 NOTE — DISCHARGE NOTE NURSING/CASE MANAGEMENT/SOCIAL WORK - NSDCPEFALRISK_GEN_ALL_CORE
For information on Fall & Injury Prevention, visit: [email or web address] OR  [email or web address] OR  https://www.cdc.gov/steadi/patient.html

## 2023-08-27 NOTE — PROGRESS NOTE ADULT - ASSESSMENT
82 yo F POD2 from robotic virgil with ERCP for gallstone pancreatitis. VSS. Afebrile.  82 yo F POD2 from robotic virgil with ERCP for gallstone pancreatitis. VSS. Afebrile.     As in above full PA notation.  Ms. Jimenez personally seen/ examined during am rounds.  Complains of discomfort, not so much demetrius pain.  However, appetite "so so" per her report now to me.  Vitals non-suggestive.  Wounds clean and abdomen soft with appropriate incisional tenderness.  Labs reassuring from today.  Clinically, improving overall.  Surgically, stable for present.  To continue current supportive care.  If tolerating diet, no objection to discharge at Hospitalist discretion.  If discomfort/ pain worsens after lunch or intake poor, recommend holding discharge and checking amylase/ lipase to rule out further pancreatitis.  Reviewed with patient in detail, Surgical PA's as well as today's RN.

## 2023-08-28 LAB
CULTURE RESULTS: SIGNIFICANT CHANGE UP
CULTURE RESULTS: SIGNIFICANT CHANGE UP
SPECIMEN SOURCE: SIGNIFICANT CHANGE UP
SPECIMEN SOURCE: SIGNIFICANT CHANGE UP

## 2023-08-29 LAB — SURGICAL PATHOLOGY STUDY: SIGNIFICANT CHANGE UP

## 2023-09-21 ENCOUNTER — RX RENEWAL (OUTPATIENT)
Age: 83
End: 2023-09-21

## 2023-09-21 RX ORDER — CELECOXIB 200 MG/1
200 CAPSULE ORAL DAILY
Qty: 30 | Refills: 0 | Status: ACTIVE | COMMUNITY
Start: 2023-02-02 | End: 1900-01-01

## 2023-10-31 ENCOUNTER — APPOINTMENT (OUTPATIENT)
Dept: ORTHOPEDIC SURGERY | Facility: CLINIC | Age: 83
End: 2023-10-31
Payer: MEDICARE

## 2023-10-31 PROCEDURE — 73562 X-RAY EXAM OF KNEE 3: CPT | Mod: LT

## 2023-10-31 PROCEDURE — 99213 OFFICE O/P EST LOW 20 MIN: CPT

## 2023-12-06 ENCOUNTER — APPOINTMENT (OUTPATIENT)
Dept: ORTHOPEDIC SURGERY | Facility: CLINIC | Age: 83
End: 2023-12-06
Payer: MEDICARE

## 2023-12-06 VITALS — HEIGHT: 61 IN | BODY MASS INDEX: 27.19 KG/M2 | WEIGHT: 144 LBS

## 2023-12-06 PROCEDURE — 99213 OFFICE O/P EST LOW 20 MIN: CPT

## 2023-12-29 NOTE — ANESTHESIA FOLLOW-UP NOTE - NSEVALATION_GEN_ALL_CORE
No apparent complications or complaints regarding anesthesia care at this time/All questions were answered
No

## 2024-03-06 ENCOUNTER — APPOINTMENT (OUTPATIENT)
Dept: ORTHOPEDIC SURGERY | Facility: CLINIC | Age: 84
End: 2024-03-06
Payer: MEDICARE

## 2024-03-06 DIAGNOSIS — Z96.652 PRESENCE OF LEFT ARTIFICIAL KNEE JOINT: ICD-10-CM

## 2024-03-06 DIAGNOSIS — M47.817 SPONDYLOSIS W/OUT MYELOPATHY OR RADICULOPATHY, LUMBOSACRAL REGION: ICD-10-CM

## 2024-03-06 PROCEDURE — 99213 OFFICE O/P EST LOW 20 MIN: CPT

## 2024-03-06 PROCEDURE — 73502 X-RAY EXAM HIP UNI 2-3 VIEWS: CPT

## 2024-03-06 NOTE — DISCUSSION/SUMMARY
[de-identified] : 83 year female old s/p left total knee replacement on 5/4/2023 with patellar tendonitis, mild varus laxity mid-range, and + flexion distraction mildly positive with medial and lateral collateral ligaments in tact  Continue with PT. A note provided today for her physical therapist. I recommended a referral to a spine specialist for further evaluation of her symptoms.  I have recommended utilizing a knee sleeve to provide added support and stability.  The patient understood and verbally agreed to the treatment plan. All of their questions were answered. The patient should call the office if they have any questions or experience worsening symptoms.

## 2024-03-06 NOTE — HISTORY OF PRESENT ILLNESS
[de-identified] : HOMAR DRAKE is a 83 year old female who presents for follow up evaluation of s/p left total knee replacement on 5/4/2023. Patient reports an immediate onset following waking up from surgery of a chronic "burning" sensation diffused left knee goes down to the bottom of the calf has not improved since last visit. Diffused pain and burning diagnosed with "Infection" placed on oral antibiotics by rehab physician.  Patient reports a history of chronic back pain. On 05/24/2023, she was in the hospital for phlebitis, gallbladder surgery, and a pre-existing E. Coli infection. When came back from the hospital she had developed COVID infection. She reports difficulty with fully extending the knee. Post operative onset of left knee stiffness and pain anterior aspect and is dependent on banister for support when going up and down the stairs. Seen by vascular, Dr. Urbina, and has been started on antibiotics. She's been getting PT at home 3x a week with minimal improvements in pain. She reports numbness and anterior knee pain when she gets up in the morning with associated restricted motion and difficulty when getting up from a seated position. Pain is worse when sitting for prolonged periods of time. She notes the pain is the worst in the morning. She complains of continued intermittent pain on the anterior aspect of the right knee, pain is worse with getting up from a seated position, stairs, prolonged walking. She notes associated stiffness. She has been attending PT at home with improvements in strength, ROM but not in pain. She also reports a history of hip and back pain. She is scheduled for a vascular procedure.   Of note, PMHx of scoliosis. She's had injections into her back years ago with 3-4 months of relief.

## 2024-03-06 NOTE — REASON FOR VISIT
[Follow-Up Visit] : a follow-up visit for [FreeTextEntry2] : s/p left total knee replacement on 5/4/2023.

## 2024-03-06 NOTE — ADDENDUM
[FreeTextEntry1] : I, Lotus Dickey, acted solely as a scribe for Dr. Rex Figueredo MD on this date  03/06/2024  All medical record entries made by the Scribe were at my, Dr. Rex Figueredo MD , direction and personally dictated by me on 03/06/2024. I have reviewed the chart and agree that the record accurately reflects my personal performance of the history, physical exam, assessment and plan. I have also personally directed, reviewed, and agreed with the chart.

## 2024-03-06 NOTE — PHYSICAL EXAM
[de-identified] : Constitutional: Well nourished , well developed and in no acute distress Psychiatric: Alert and oriented to time place and person.Appropriate affect . Skin: Head, neck, arms and lower extremities:no lesions or discoloration HEENT: Normocephalic, EOM intact, Nasal septum midline, Respiratory: Unlabored respirations,no audible wheezing ,no tachypnea, no cyanosis Cardiovascular: No leg swelling no ankle edema no JVD, pulse regular Vascular: No calf or thigh tenderness, Peripheral pulses: intact Lymphatics: No groin adenopathy,no lymphedema lower or upper extremities  Left Knee Exam: Inspection/Palpation : tenderness to palpation over the inferior pull patellar, no knee effusion, no deformity, incision well healed. Range of Motion : 0 - 120 degrees, no crepitus Stability : mild varus laxity mid-range present on provocative testing, Lachmans Test (-), Flexion Distraction (+ Mildly positive with medial and lateral collateral ligaments in tact) Sensation : sensation to pin intact Vascular Exam : no edema, no cyanosis, dorsalis pedis artery pulse 2+, posterior tibial artery pulse 2+ Skin :  mild erythema distal leg, no ecchymosis [de-identified] : o AP/Lateral/skyline views of the LEFT knee obtained today 03/06/2024 demonstrates total knee replacement components in good alignment, no evidence of loosening, well centered patella.  o X-rays of the AP pelvis and AP, lateral of the LEFT hip obtained 03/06/2024 demonstrates within normal limits  o AP/Lateral/skyline views of the LEFT knee obtained 10/31/2023 demonstrates total knee replacement components in good alignment, no evidence of loosening, well centered patella with no interval changes.

## 2024-04-02 NOTE — H&P PST ADULT - PRO ARRIVE FROM
Patients BP readings reviewed and addressed, as we age our arteries turn stiffer and less elastic. Restricting salt consumption and staying physically fit with regular exercise regimen is the only way to keep our vasculature less tonic. Studies have shown that keeping ideal body wt, exercise routine about 140 to 150 minutes a week, eating variety of plant based diet and drinking plentiful water are quite helpful. Monitor BP twice or once a week at home and bring log to be reviewed by me. Uncontrolled BP has long term consequences including heart failure, myocardial infarction, accelerated atherosclerosis and kidney dysfunction. Therapy reviewed and explained.  Keep blood pressure less than 120/80 patient never took ramipril give Toprol-XL 50 mg at night BMP twice a year   home

## 2024-06-06 NOTE — ED PROVIDER NOTE - CONTEXT
Bed in lowest position, wheels locked, appropriate side rails in place/Call bell, personal items and telephone in reach/Instruct patient to call for assistance before getting out of bed or chair/Non-slip footwear when patient is out of bed/[place] to call system/Physically safe environment - no spills, clutter or unnecessary equipment/Purposeful Proactive Rounding/Room/bathroom lighting operational, light cord in reach unknown

## 2024-07-24 NOTE — HISTORY OF PRESENT ILLNESS
[de-identified] : Right lower quadrant pain, and hard-painful bowel movements [de-identified] : 82 year old white female who presents c/o 6 months of abdominal pain. Pt states that her pain is localized to the RLQ and does not radiate. Pt states her pain increases when she has hard, painful bowel movements. She denies any nausea or vomiting. She has not felt a lump or swelling. She had a ct scan that showed a small right groin 'protrusion' Consent: - Consent was obtained and risks were reviewed prior to procedure today. All questions were answered prior to procedure today.\\n- Risks discussed include but are not limited to scarring, infection, bleeding, scabbing, incomplete removal, nerve damage, pain, and allergy to anesthesia. Anesthesia Volume In Cc: 0.3 Bill 73577 For Specimen Handling/Conveyance To Laboratory?: no Lab: [id number] Lab Facility: 0 [facility] Biopsy Depth?: Yes Electrodesiccation Text: The wound bed was treated with electrodesiccation after the biopsy was performed. Detail Level: Detailed Hemostasis: Drysol Type Of Destruction Used: Curettage Biopsy Type: H and E Electrodesiccation And Curettage Text: The wound bed was treated with electrodesiccation and curettage after the biopsy was performed. Anesthesia Type: 1% lidocaine with epinephrine Post-Care Instructions: WOUND CARE:\\nDo NOT submerge wound in a bath, swimming pool, or hot tub for at least 1 week or for as long as there is an open wound. Gently cleanse the site daily with mild soap and water. Be careful NOT to allow a forceful stream of water to hit the biopsy site. After cleaning/showering, apply a thin layer of petrolatum ointment or Aquaphor in the wound followed by an adhesive bandage. Continue this process daily until healed. \\n\\nBLEEDING:\\nIf you develop persistent bleeding, apply firm and steady pressure over the dressing with gauze for 15 minutes. If bleeding persists, reapply pressure with an ice pack over the gauze for 15 minutes. NEVER APPLY ICE DIRECTLY TO THE SKIN. Do NOT peek under the gauze during these 15 minutes of pressure.  Call our office at [phone] if you cannot get the bleeding to stop. \\n\\nINFECTION:\\nSigns of infection may include increasing rather than decreasing pain (after the first few days), increasing redness/swelling/heat, draining pus, pink/red streaks around the wound, and fever or chills.  Please call our office immediately at [phone] if infection is suspected. It is normal to have some mild redness on or around the wound edges; this will lighten day by day and will become less tender.\\n\\nPAIN:\\nPain is usually minimal, but if needed you may take acetaminophen (Tylenol) every four hours as needed. Applying an ice pack over the dressing for 15-20 minutes every 2-3 hours will relieve swelling, lessen pain, and help minimize bruising. NEVER APPLY ICE DIRECTLY TO THE SKIN. Avoid ibuprofen (Advil, Motrin) and naproxen (Aleve) for the first 48 hours as these can increase bleeding.\\n\\nSWELLING AND BRUISING:\\nSwelling and bruising are common and temporary, usually lasting 1 - 2 weeks. It is more common in areas treated around the eyes, hands, and feet. In the days following the procedure, swelling and bruising can be minimized by keeping the affected areas elevated when possible, reducing salty foods, and applying ice packs over the dressing for 15-20 minutes every 2-3 hours. NEVER APPLY ICE DIRECTLY TO THE SKIN.\\n\\nITCHING:\\nItchiness on and around the wound is very common and can last several days to weeks after surgery. Mild itch is normal as the wound is healing. \\n\\nNERVE CHANGES:\\nNumbness is usually temporary, but it may last for several weeks to months. You may also experience sharp pains at the wound sight as it heals.  Mild pain is normal and will gradually improve with time.\\n \\nNO SMOKING:\\nSmoking will delay the healing process. If you smoke, we recommend trying to quit or at minimum reduce how much you smoke following a procedure. Wound Care: Aquaphor Depth Of Biopsy: dermis Billing Type: Third-Party Bill Biopsy Method: Dermablade Curettage Text: The wound bed was treated with curettage after the biopsy was performed. Silver Nitrate Text: The wound bed was treated with silver nitrate after the biopsy was performed. Notification Instructions: - It can take up to 2 -3 weeks to get a biopsy result. \\n- Please refrain from calling to request results until after 2 weeks. Information: Selecting Yes will display possible errors in your note based on the variables you have selected. This validation is only offered as a suggestion for you. PLEASE NOTE THAT THE VALIDATION TEXT WILL BE REMOVED WHEN YOU FINALIZE YOUR NOTE. IF YOU WANT TO FAX A PRELIMINARY NOTE YOU WILL NEED TO TOGGLE THIS TO 'NO' IF YOU DO NOT WANT IT IN YOUR FAXED NOTE. Cryotherapy Text: The wound bed was treated with cryotherapy after the biopsy was performed. Dressing: bandage

## 2024-09-30 NOTE — CARE COORDINATION ASSESSMENT. - LIVES WITH, PROFILE
Post CCTA pt denies feeling dizzy or lightheaded, denies headache. Steady on feet, skin warm pink and dry. Pt verbalized understanding of increased water intake x24 hours, educated on side effects of medications. HL removed with tip intact, manual pressure held until hemostasis achieved, 2x2 and coban to site. Pt DC home to self care     
spouse

## (undated) DEVICE — XI DRAPE ARM

## (undated) DEVICE — CRYO/CUFF GRAVITY COOLER KNEE LARGE

## (undated) DEVICE — ELCTR GROUNDING PAD ADULT COVIDIEN

## (undated) DEVICE — MARKING PEN W RULER

## (undated) DEVICE — DRSG WEBRIL 6"

## (undated) DEVICE — ELCTR STRYKER NEPTUNE SMOKE EVACUATION PENCIL (GREEN)

## (undated) DEVICE — POSITIONER STRAP ARMBOARD VELCRO TS-30

## (undated) DEVICE — BAG SPONGE COUNTER EZ

## (undated) DEVICE — SOL IRR POUR NS 0.9% 1000ML

## (undated) DEVICE — WARMING BLANKET UPPER ADULT

## (undated) DEVICE — TOURNIQUET CUFF 34" DUAL PORT W PLC

## (undated) DEVICE — XI OBTURATOR OPTICAL BLADELESS 8MM

## (undated) DEVICE — VISITEC 4X4

## (undated) DEVICE — HOOD FLYTE STRYKER HELMET SHIELD

## (undated) DEVICE — SYR LUER LOK 20CC

## (undated) DEVICE — POSITIONER STIRRUP STRAP W SLIP RING 19X3.5"

## (undated) DEVICE — DRAPE SPLIT SHEET 77" X 120"

## (undated) DEVICE — DRSG DERMABOND 0.7ML

## (undated) DEVICE — WARMING BLANKET LOWER ADULT

## (undated) DEVICE — PLV-SCD MACHINE: Type: DURABLE MEDICAL EQUIPMENT

## (undated) DEVICE — PLASTIC SOLUTION BOWL 160Z

## (undated) DEVICE — TUBING STRYKER PNEUMOCLEAR HIGH FLOW

## (undated) DEVICE — DRSG SILVERLON ISLAND 4X6" 2X4" PAD

## (undated) DEVICE — HOOD T5 PEELAWAY

## (undated) DEVICE — BLADE SURGICAL #10 STAINLESS

## (undated) DEVICE — VENODYNE/SCD SLEEVE CALF LARGE

## (undated) DEVICE — DRAIN JACKSON PRATT 3 SPRING RESERVOIR W 10FR PVC DRAIN

## (undated) DEVICE — SUT MONOCRYL 4-0 27" PS-2 UNDYED

## (undated) DEVICE — ENDOCATCH 10MM SPECIMEN POUCH

## (undated) DEVICE — SUT MONOSOF 3-0 30" C-16

## (undated) DEVICE — INJ SYS RAP REFIL

## (undated) DEVICE — XI DRAPE COLUMN

## (undated) DEVICE — BRUSH CYTO RAP EXCHG 3MM

## (undated) DEVICE — XI ENDOWRIST SUCTION IRRIGATOR 8MM

## (undated) DEVICE — D HELP - CLEARVIEW CLEARIFY SYSTEM

## (undated) DEVICE — DRAPE 3/4 SHEET 52X76"

## (undated) DEVICE — DRAPE INSTRUMENT POUCH 6.75" X 11"

## (undated) DEVICE — PACK GENERAL LAPAROSCOPY

## (undated) DEVICE — BLADE SCALPEL SAFETYLOCK #15

## (undated) DEVICE — HANDPIECE INTERPULSE W MULTI TIP

## (undated) DEVICE — SOLIDIFIER CANN EXPRESS 3K

## (undated) DEVICE — NDL HYPO REGULAR BEVEL 25G X 1.5" (BLUE)

## (undated) DEVICE — GOWN TRIMAX XXL

## (undated) DEVICE — STAPLER SKIN PROXIMATE

## (undated) DEVICE — WOUND IRR IRRISEPT W 0.5 CHG

## (undated) DEVICE — BLADE SURGICAL #15 CARBON

## (undated) DEVICE — SOL IRR BAG NS 0.9% 3000ML

## (undated) DEVICE — DRSG CURITY GAUZE SPONGE 4 X 4" 12-PLY

## (undated) DEVICE — GLV 8 PROTEXIS (WHITE)

## (undated) DEVICE — DRSG SILVERLON ISLAND 4X14" 2X12" PAD

## (undated) DEVICE — PACK TOTAL KNEE

## (undated) DEVICE — ELCTR BOVIE TIP BLADE INSULATED 2.75" EDGE

## (undated) DEVICE — FOLEY TRAY 14FR 5CC LF BAG CLOSED

## (undated) DEVICE — PLV/PSP-ESU FORCEFX F8I7418A: Type: DURABLE MEDICAL EQUIPMENT

## (undated) DEVICE — SOL IRR POUR H2O 1000ML

## (undated) DEVICE — POSITIONER FOAM SLOTTED HEAD CRADLE (PINK)

## (undated) DEVICE — XI SEAL UNIV 5- 8 MM

## (undated) DEVICE — SNARE POLYP SENS 27MM 240CM

## (undated) DEVICE — DRAPE TOWEL BLUE 17" X 24"

## (undated) DEVICE — SOL IRR POUR H2O 1500ML

## (undated) DEVICE — DRAPE IOBAN 23" X 23"

## (undated) DEVICE — XI ENDOWRIST 12 - 8 MM CANNULA REDUCER

## (undated) DEVICE — SUT POLYSORB 0 30" GU-45

## (undated) DEVICE — LOK DVC RX AND BIOPSY

## (undated) DEVICE — XI 12MM AND STAPLER CANNULA SEAL

## (undated) DEVICE — SUT POLYSORB 1 27" GS-12 UNDYED

## (undated) DEVICE — SPECIMEN CONTAINER PET

## (undated) DEVICE — VENODYNE/SCD SLEEVE CALF MEDIUM

## (undated) DEVICE — GLV 8 PROTEXIS (BLUE)

## (undated) DEVICE — SOL IRR BAG NS 0.9% 1000ML

## (undated) DEVICE — GLV 7.5 PROTEXIS (WHITE)

## (undated) DEVICE — SUT POLYSORB 2-0 30" GS-11 UNDYED

## (undated) DEVICE — SYR LUER SLIP TIP 30CC

## (undated) DEVICE — DRAPE 3/4 SHEET W REINFORCEMENT 56X77"

## (undated) DEVICE — SMOKE EVACUTATION SYS LAPROSCOPIC AC/PA

## (undated) DEVICE — TUBING SUCTION 20FT

## (undated) DEVICE — SOL IRR POUR NS 0.9% 500ML